# Patient Record
Sex: FEMALE | Race: WHITE | Employment: OTHER | URBAN - METROPOLITAN AREA
[De-identification: names, ages, dates, MRNs, and addresses within clinical notes are randomized per-mention and may not be internally consistent; named-entity substitution may affect disease eponyms.]

---

## 2017-01-01 ENCOUNTER — HOME HEALTH ADMISSION (OUTPATIENT)
Dept: HOME HEALTH SERVICES | Facility: HOME HEALTH | Age: 82
End: 2017-01-01
Payer: MEDICARE

## 2017-01-01 ENCOUNTER — APPOINTMENT (OUTPATIENT)
Dept: GENERAL RADIOLOGY | Age: 82
DRG: 469 | End: 2017-01-01
Attending: ORTHOPAEDIC SURGERY
Payer: MEDICARE

## 2017-01-01 ENCOUNTER — HOME CARE VISIT (OUTPATIENT)
Dept: SCHEDULING | Facility: HOME HEALTH | Age: 82
End: 2017-01-01
Payer: MEDICARE

## 2017-01-01 ENCOUNTER — PATIENT OUTREACH (OUTPATIENT)
Dept: CASE MANAGEMENT | Age: 82
End: 2017-01-01

## 2017-01-01 ENCOUNTER — APPOINTMENT (OUTPATIENT)
Dept: ULTRASOUND IMAGING | Age: 82
DRG: 193 | End: 2017-01-01
Attending: HOSPITALIST
Payer: MEDICARE

## 2017-01-01 ENCOUNTER — HOSPITAL ENCOUNTER (INPATIENT)
Age: 82
LOS: 6 days | Discharge: HOME HEALTH | DRG: 193 | End: 2017-10-05
Admitting: HOSPITALIST
Payer: MEDICARE

## 2017-01-01 ENCOUNTER — APPOINTMENT (OUTPATIENT)
Dept: GENERAL RADIOLOGY | Age: 82
DRG: 683 | End: 2017-01-01
Attending: EMERGENCY MEDICINE
Payer: MEDICARE

## 2017-01-01 ENCOUNTER — APPOINTMENT (OUTPATIENT)
Dept: GENERAL RADIOLOGY | Age: 82
DRG: 193 | End: 2017-01-01
Attending: HOSPITALIST
Payer: MEDICARE

## 2017-01-01 ENCOUNTER — APPOINTMENT (OUTPATIENT)
Dept: GENERAL RADIOLOGY | Age: 82
DRG: 193 | End: 2017-01-01
Payer: MEDICARE

## 2017-01-01 ENCOUNTER — ANESTHESIA EVENT (OUTPATIENT)
Dept: SURGERY | Age: 82
DRG: 469 | End: 2017-01-01
Payer: MEDICARE

## 2017-01-01 ENCOUNTER — HOSPITAL ENCOUNTER (EMERGENCY)
Age: 82
Discharge: HOME | End: 2017-08-26
Attending: STUDENT IN AN ORGANIZED HEALTH CARE EDUCATION/TRAINING PROGRAM
Payer: MEDICARE

## 2017-01-01 ENCOUNTER — HOSPITAL ENCOUNTER (OUTPATIENT)
Age: 82
Setting detail: OBSERVATION
Discharge: HOME | End: 2017-07-18
Attending: EMERGENCY MEDICINE | Admitting: INTERNAL MEDICINE
Payer: MEDICARE

## 2017-01-01 ENCOUNTER — ANESTHESIA (OUTPATIENT)
Dept: SURGERY | Age: 82
DRG: 469 | End: 2017-01-01
Payer: MEDICARE

## 2017-01-01 ENCOUNTER — APPOINTMENT (OUTPATIENT)
Dept: GENERAL RADIOLOGY | Age: 82
DRG: 469 | End: 2017-01-01
Attending: INTERNAL MEDICINE
Payer: MEDICARE

## 2017-01-01 ENCOUNTER — APPOINTMENT (OUTPATIENT)
Dept: GENERAL RADIOLOGY | Age: 82
End: 2017-01-01
Attending: NURSE PRACTITIONER
Payer: MEDICARE

## 2017-01-01 ENCOUNTER — APPOINTMENT (OUTPATIENT)
Dept: GENERAL RADIOLOGY | Age: 82
DRG: 469 | End: 2017-01-01
Attending: NURSE PRACTITIONER
Payer: MEDICARE

## 2017-01-01 ENCOUNTER — HOSPITAL ENCOUNTER (INPATIENT)
Age: 82
LOS: 2 days | Discharge: HOME | DRG: 683 | End: 2017-08-18
Attending: EMERGENCY MEDICINE | Admitting: INTERNAL MEDICINE
Payer: MEDICARE

## 2017-01-01 ENCOUNTER — HOSPITAL ENCOUNTER (INPATIENT)
Age: 82
LOS: 3 days | Discharge: NURSING, REHAB OR LONG-TERM CARE | DRG: 469 | End: 2017-01-15
Attending: ORTHOPAEDIC SURGERY | Admitting: ORTHOPAEDIC SURGERY
Payer: MEDICARE

## 2017-01-01 ENCOUNTER — HOSPITAL ENCOUNTER (OUTPATIENT)
Dept: LAB | Age: 82
Discharge: HOME | End: 2017-07-07
Attending: INTERNAL MEDICINE
Payer: MEDICARE

## 2017-01-01 ENCOUNTER — APPOINTMENT (OUTPATIENT)
Dept: CT IMAGING | Age: 82
End: 2017-01-01
Attending: STUDENT IN AN ORGANIZED HEALTH CARE EDUCATION/TRAINING PROGRAM
Payer: MEDICARE

## 2017-01-01 ENCOUNTER — APPOINTMENT (OUTPATIENT)
Dept: CT IMAGING | Age: 82
DRG: 193 | End: 2017-01-01
Attending: HOSPITALIST
Payer: MEDICARE

## 2017-01-01 VITALS
SYSTOLIC BLOOD PRESSURE: 137 MMHG | TEMPERATURE: 97.9 F | RESPIRATION RATE: 18 BRPM | OXYGEN SATURATION: 91 % | HEIGHT: 63 IN | DIASTOLIC BLOOD PRESSURE: 66 MMHG | WEIGHT: 125 LBS | BODY MASS INDEX: 22.15 KG/M2 | HEART RATE: 78 BPM

## 2017-01-01 VITALS
DIASTOLIC BLOOD PRESSURE: 48 MMHG | BODY MASS INDEX: 20.2 KG/M2 | RESPIRATION RATE: 15 BRPM | HEART RATE: 70 BPM | TEMPERATURE: 97.7 F | SYSTOLIC BLOOD PRESSURE: 106 MMHG | WEIGHT: 114 LBS | OXYGEN SATURATION: 92 % | HEIGHT: 63 IN

## 2017-01-01 VITALS
BODY MASS INDEX: 22.68 KG/M2 | DIASTOLIC BLOOD PRESSURE: 50 MMHG | SYSTOLIC BLOOD PRESSURE: 100 MMHG | HEART RATE: 72 BPM | WEIGHT: 128 LBS | OXYGEN SATURATION: 92 % | HEIGHT: 63 IN

## 2017-01-01 VITALS
DIASTOLIC BLOOD PRESSURE: 52 MMHG | BODY MASS INDEX: 21.62 KG/M2 | OXYGEN SATURATION: 95 % | HEART RATE: 69 BPM | TEMPERATURE: 97.4 F | SYSTOLIC BLOOD PRESSURE: 132 MMHG | WEIGHT: 122 LBS | RESPIRATION RATE: 16 BRPM | HEIGHT: 63 IN

## 2017-01-01 VITALS
OXYGEN SATURATION: 92 % | RESPIRATION RATE: 18 BRPM | HEART RATE: 72 BPM | DIASTOLIC BLOOD PRESSURE: 68 MMHG | SYSTOLIC BLOOD PRESSURE: 124 MMHG

## 2017-01-01 VITALS
RESPIRATION RATE: 23 BRPM | DIASTOLIC BLOOD PRESSURE: 60 MMHG | HEART RATE: 76 BPM | OXYGEN SATURATION: 98 % | SYSTOLIC BLOOD PRESSURE: 118 MMHG | TEMPERATURE: 95.4 F

## 2017-01-01 VITALS
HEIGHT: 62 IN | BODY MASS INDEX: 22.63 KG/M2 | OXYGEN SATURATION: 94 % | WEIGHT: 123 LBS | SYSTOLIC BLOOD PRESSURE: 109 MMHG | HEART RATE: 70 BPM | DIASTOLIC BLOOD PRESSURE: 38 MMHG | RESPIRATION RATE: 18 BRPM | TEMPERATURE: 97.8 F

## 2017-01-01 VITALS
RESPIRATION RATE: 16 BRPM | SYSTOLIC BLOOD PRESSURE: 120 MMHG | OXYGEN SATURATION: 95 % | HEART RATE: 76 BPM | TEMPERATURE: 99.3 F | DIASTOLIC BLOOD PRESSURE: 60 MMHG

## 2017-01-01 VITALS
TEMPERATURE: 96.7 F | HEART RATE: 74 BPM | SYSTOLIC BLOOD PRESSURE: 120 MMHG | RESPIRATION RATE: 16 BRPM | DIASTOLIC BLOOD PRESSURE: 58 MMHG

## 2017-01-01 VITALS
TEMPERATURE: 95 F | SYSTOLIC BLOOD PRESSURE: 125 MMHG | HEART RATE: 69 BPM | DIASTOLIC BLOOD PRESSURE: 55 MMHG | OXYGEN SATURATION: 93 %

## 2017-01-01 VITALS
RESPIRATION RATE: 17 BRPM | SYSTOLIC BLOOD PRESSURE: 118 MMHG | DIASTOLIC BLOOD PRESSURE: 58 MMHG | TEMPERATURE: 96.6 F | HEART RATE: 72 BPM

## 2017-01-01 VITALS
TEMPERATURE: 95.7 F | RESPIRATION RATE: 18 BRPM | DIASTOLIC BLOOD PRESSURE: 60 MMHG | HEART RATE: 68 BPM | SYSTOLIC BLOOD PRESSURE: 112 MMHG

## 2017-01-01 VITALS
SYSTOLIC BLOOD PRESSURE: 112 MMHG | TEMPERATURE: 98 F | DIASTOLIC BLOOD PRESSURE: 58 MMHG | RESPIRATION RATE: 16 BRPM | OXYGEN SATURATION: 97 % | HEART RATE: 77 BPM

## 2017-01-01 VITALS
TEMPERATURE: 96 F | HEART RATE: 70 BPM | OXYGEN SATURATION: 96 % | DIASTOLIC BLOOD PRESSURE: 46 MMHG | BODY MASS INDEX: 25.52 KG/M2 | HEIGHT: 63 IN | SYSTOLIC BLOOD PRESSURE: 99 MMHG | WEIGHT: 144 LBS | RESPIRATION RATE: 18 BRPM

## 2017-01-01 VITALS
TEMPERATURE: 97.8 F | HEART RATE: 70 BPM | OXYGEN SATURATION: 97 % | DIASTOLIC BLOOD PRESSURE: 52 MMHG | RESPIRATION RATE: 20 BRPM | SYSTOLIC BLOOD PRESSURE: 108 MMHG

## 2017-01-01 VITALS
HEIGHT: 63 IN | HEART RATE: 82 BPM | BODY MASS INDEX: 21.09 KG/M2 | SYSTOLIC BLOOD PRESSURE: 102 MMHG | RESPIRATION RATE: 16 BRPM | WEIGHT: 119 LBS | DIASTOLIC BLOOD PRESSURE: 54 MMHG | OXYGEN SATURATION: 98 % | TEMPERATURE: 96.7 F

## 2017-01-01 VITALS
HEART RATE: 84 BPM | RESPIRATION RATE: 18 BRPM | TEMPERATURE: 97.3 F | SYSTOLIC BLOOD PRESSURE: 118 MMHG | DIASTOLIC BLOOD PRESSURE: 58 MMHG

## 2017-01-01 VITALS
HEART RATE: 76 BPM | OXYGEN SATURATION: 95 % | SYSTOLIC BLOOD PRESSURE: 108 MMHG | DIASTOLIC BLOOD PRESSURE: 58 MMHG | TEMPERATURE: 95.7 F | RESPIRATION RATE: 19 BRPM

## 2017-01-01 VITALS
OXYGEN SATURATION: 94 % | HEART RATE: 78 BPM | TEMPERATURE: 97.5 F | RESPIRATION RATE: 16 BRPM | DIASTOLIC BLOOD PRESSURE: 78 MMHG | SYSTOLIC BLOOD PRESSURE: 122 MMHG

## 2017-01-01 VITALS
HEART RATE: 72 BPM | DIASTOLIC BLOOD PRESSURE: 56 MMHG | SYSTOLIC BLOOD PRESSURE: 118 MMHG | TEMPERATURE: 97.1 F | RESPIRATION RATE: 16 BRPM

## 2017-01-01 VITALS
TEMPERATURE: 97.2 F | HEART RATE: 80 BPM | DIASTOLIC BLOOD PRESSURE: 60 MMHG | SYSTOLIC BLOOD PRESSURE: 108 MMHG | RESPIRATION RATE: 20 BRPM

## 2017-01-01 VITALS
RESPIRATION RATE: 14 BRPM | TEMPERATURE: 97.6 F | DIASTOLIC BLOOD PRESSURE: 87 MMHG | HEART RATE: 67 BPM | SYSTOLIC BLOOD PRESSURE: 115 MMHG

## 2017-01-01 VITALS — DIASTOLIC BLOOD PRESSURE: 60 MMHG | OXYGEN SATURATION: 95 % | SYSTOLIC BLOOD PRESSURE: 120 MMHG | HEART RATE: 67 BPM

## 2017-01-01 DIAGNOSIS — N17.9 AKI (ACUTE KIDNEY INJURY) (HCC): Primary | ICD-10-CM

## 2017-01-01 DIAGNOSIS — E86.0 DEHYDRATION: ICD-10-CM

## 2017-01-01 DIAGNOSIS — R79.89 ELEVATED BRAIN NATRIURETIC PEPTIDE (BNP) LEVEL: ICD-10-CM

## 2017-01-01 DIAGNOSIS — N17.9 ACUTE KIDNEY INJURY (HCC): ICD-10-CM

## 2017-01-01 DIAGNOSIS — N17.9 AKI (ACUTE KIDNEY INJURY) (HCC): ICD-10-CM

## 2017-01-01 DIAGNOSIS — I50.9 CONGESTIVE HEART FAILURE, UNSPECIFIED CONGESTIVE HEART FAILURE CHRONICITY, UNSPECIFIED CONGESTIVE HEART FAILURE TYPE: ICD-10-CM

## 2017-01-01 DIAGNOSIS — R77.8 ELEVATED TROPONIN: ICD-10-CM
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
## 2017-01-01 DIAGNOSIS — R19.7 DIARRHEA, UNSPECIFIED TYPE: ICD-10-CM

## 2017-01-01 DIAGNOSIS — I50.22 CHRONIC SYSTOLIC HEART FAILURE (HCC): ICD-10-CM

## 2017-01-01 DIAGNOSIS — N30.00 ACUTE CYSTITIS WITHOUT HEMATURIA: ICD-10-CM

## 2017-01-01 DIAGNOSIS — S52.272A CLOSED MONTEGGIA'S FRACTURE OF LEFT ULNA, INITIAL ENCOUNTER: Primary | ICD-10-CM

## 2017-01-01 DIAGNOSIS — R06.02 SOB (SHORTNESS OF BREATH): Primary | ICD-10-CM

## 2017-01-01 DIAGNOSIS — I95.9 HYPOTENSION, UNSPECIFIED HYPOTENSION TYPE: Primary | ICD-10-CM

## 2017-01-01 LAB
ABO + RH BLD: NORMAL
ALBUMIN SERPL BCP-MCNC: 2.9 G/DL (ref 3.2–4.6)
ALBUMIN SERPL BCP-MCNC: 3 G/DL (ref 3.2–4.6)
ALBUMIN SERPL-MCNC: 3 G/DL (ref 3.2–4.6)
ALBUMIN SERPL-MCNC: 3.3 G/DL (ref 3.2–4.6)
ALBUMIN SERPL-MCNC: 3.4 G/DL (ref 3.2–4.6)
ALBUMIN/GLOB SERPL: 0.7 {RATIO} (ref 1.2–3.5)
ALBUMIN/GLOB SERPL: 0.8 {RATIO} (ref 1.2–3.5)
ALBUMIN/GLOB SERPL: 1 {RATIO} (ref 1.2–3.5)
ALP SERPL-CCNC: 116 U/L (ref 50–136)
ALP SERPL-CCNC: 139 U/L (ref 50–136)
ALP SERPL-CCNC: 83 U/L (ref 50–136)
ALP SERPL-CCNC: 94 U/L (ref 50–136)
ALP SERPL-CCNC: 96 U/L (ref 50–136)
ALT SERPL-CCNC: 161 U/L (ref 12–65)
ALT SERPL-CCNC: 19 U/L (ref 12–65)
ALT SERPL-CCNC: 20 U/L (ref 12–65)
ALT SERPL-CCNC: 29 U/L (ref 12–65)
ALT SERPL-CCNC: 58 U/L (ref 12–65)
ANION GAP BLD CALC-SCNC: 10 MMOL/L (ref 7–16)
ANION GAP BLD CALC-SCNC: 10 MMOL/L (ref 7–16)
ANION GAP BLD CALC-SCNC: 11 MMOL/L (ref 7–16)
ANION GAP BLD CALC-SCNC: 13 MMOL/L (ref 7–16)
ANION GAP BLD CALC-SCNC: 8 MMOL/L
ANION GAP SERPL CALC-SCNC: 12 MMOL/L (ref 7–16)
ANION GAP SERPL CALC-SCNC: 12 MMOL/L (ref 7–16)
ANION GAP SERPL CALC-SCNC: 13 MMOL/L (ref 7–16)
ANION GAP SERPL CALC-SCNC: 13 MMOL/L (ref 7–16)
ANION GAP SERPL CALC-SCNC: 14 MMOL/L (ref 7–16)
ANION GAP SERPL CALC-SCNC: 16 MMOL/L (ref 7–16)
ANION GAP SERPL CALC-SCNC: 17 MMOL/L (ref 7–16)
ANION GAP SERPL CALC-SCNC: 9 MMOL/L (ref 7–16)
APPEARANCE UR: ABNORMAL
APPEARANCE UR: ABNORMAL
ARTERIAL PATENCY WRIST A: POSITIVE
AST SERPL W P-5'-P-CCNC: 17 U/L (ref 15–37)
AST SERPL W P-5'-P-CCNC: 17 U/L (ref 15–37)
AST SERPL-CCNC: 165 U/L (ref 15–37)
AST SERPL-CCNC: 27 U/L (ref 15–37)
AST SERPL-CCNC: 58 U/L (ref 15–37)
ATRIAL RATE: 69 BPM
ATRIAL RATE: 70 BPM
ATRIAL RATE: 79 BPM
ATRIAL RATE: 81 BPM
BACTERIA SPEC CULT: ABNORMAL
BACTERIA SPEC CULT: NORMAL
BACTERIA URNS QL MICRO: 0 /HPF
BACTERIA URNS QL MICRO: 0 /HPF
BACTERIA URNS QL MICRO: ABNORMAL /HPF
BACTERIA URNS QL MICRO: NORMAL /HPF
BASE DEFICIT BLDA-SCNC: 4.5 MMOL/L (ref 0–2)
BASOPHILS # BLD AUTO: 0 K/UL (ref 0–0.2)
BASOPHILS # BLD: 0 % (ref 0–2)
BASOPHILS # BLD: 0 K/UL (ref 0–0.2)
BASOPHILS NFR BLD: 0 % (ref 0–2)
BASOPHILS NFR BLD: 1 % (ref 0–2)
BDY SITE: ABNORMAL
BILIRUB SERPL-MCNC: 0.4 MG/DL (ref 0.2–1.1)
BILIRUB SERPL-MCNC: 0.4 MG/DL (ref 0.2–1.1)
BILIRUB SERPL-MCNC: 0.6 MG/DL (ref 0.2–1.1)
BILIRUB SERPL-MCNC: 0.7 MG/DL (ref 0.2–1.1)
BILIRUB SERPL-MCNC: 1.3 MG/DL (ref 0.2–1.1)
BILIRUB UR QL: NEGATIVE
BILIRUB UR QL: NEGATIVE
BLD PROD TYP BPU: NORMAL
BLOOD GROUP ANTIBODIES SERPL: NORMAL
BNP SERPL-MCNC: 2047 PG/ML
BNP SERPL-MCNC: 4688 PG/ML
BNP SERPL-MCNC: 5536 PG/ML
BPU ID: NORMAL
BUN SERPL-MCNC: 30 MG/DL (ref 8–23)
BUN SERPL-MCNC: 31 MG/DL (ref 8–23)
BUN SERPL-MCNC: 34 MG/DL (ref 8–23)
BUN SERPL-MCNC: 46 MG/DL (ref 8–23)
BUN SERPL-MCNC: 49 MG/DL (ref 8–23)
BUN SERPL-MCNC: 55 MG/DL (ref 8–23)
BUN SERPL-MCNC: 57 MG/DL (ref 8–23)
BUN SERPL-MCNC: 65 MG/DL (ref 8–23)
BUN SERPL-MCNC: 68 MG/DL (ref 8–23)
BUN SERPL-MCNC: 69 MG/DL (ref 8–23)
BUN SERPL-MCNC: 73 MG/DL (ref 8–23)
BUN SERPL-MCNC: 74 MG/DL (ref 8–23)
BUN SERPL-MCNC: 74 MG/DL (ref 8–23)
BUN SERPL-MCNC: 77 MG/DL (ref 8–23)
BUN SERPL-MCNC: 84 MG/DL (ref 8–23)
CALCIUM SERPL-MCNC: 8.6 MG/DL (ref 8.3–10.4)
CALCIUM SERPL-MCNC: 8.7 MG/DL (ref 8.3–10.4)
CALCIUM SERPL-MCNC: 8.7 MG/DL (ref 8.3–10.4)
CALCIUM SERPL-MCNC: 8.8 MG/DL (ref 8.3–10.4)
CALCIUM SERPL-MCNC: 8.9 MG/DL (ref 8.3–10.4)
CALCIUM SERPL-MCNC: 8.9 MG/DL (ref 8.3–10.4)
CALCIUM SERPL-MCNC: 9 MG/DL (ref 8.3–10.4)
CALCIUM SERPL-MCNC: 9.1 MG/DL (ref 8.3–10.4)
CALCIUM SERPL-MCNC: 9.2 MG/DL (ref 8.3–10.4)
CALCIUM SERPL-MCNC: 9.4 MG/DL (ref 8.3–10.4)
CALCIUM SERPL-MCNC: 9.5 MG/DL (ref 8.3–10.4)
CALCIUM SERPL-MCNC: 9.6 MG/DL (ref 8.3–10.4)
CALCULATED P AXIS, ECG09: 102 DEGREES
CALCULATED P AXIS, ECG09: 5 DEGREES
CALCULATED P AXIS, ECG09: 84 DEGREES
CALCULATED P AXIS, ECG09: 91 DEGREES
CALCULATED R AXIS, ECG10: -124 DEGREES
CALCULATED R AXIS, ECG10: -171 DEGREES
CALCULATED R AXIS, ECG10: -87 DEGREES
CALCULATED R AXIS, ECG10: -97 DEGREES
CALCULATED T AXIS, ECG11: 58 DEGREES
CALCULATED T AXIS, ECG11: 69 DEGREES
CALCULATED T AXIS, ECG11: 69 DEGREES
CALCULATED T AXIS, ECG11: 70 DEGREES
CASTS URNS QL MICRO: 0 /LPF
CASTS URNS QL MICRO: ABNORMAL /LPF
CASTS URNS QL MICRO: NORMAL /LPF
CASTS URNS QL MICRO: NORMAL /LPF
CHLORIDE SERPL-SCNC: 101 MMOL/L (ref 98–107)
CHLORIDE SERPL-SCNC: 101 MMOL/L (ref 98–107)
CHLORIDE SERPL-SCNC: 102 MMOL/L (ref 98–107)
CHLORIDE SERPL-SCNC: 104 MMOL/L (ref 98–107)
CHLORIDE SERPL-SCNC: 105 MMOL/L (ref 98–107)
CHLORIDE SERPL-SCNC: 106 MMOL/L (ref 98–107)
CHLORIDE SERPL-SCNC: 107 MMOL/L (ref 98–107)
CHLORIDE SERPL-SCNC: 107 MMOL/L (ref 98–107)
CHLORIDE SERPL-SCNC: 108 MMOL/L (ref 98–107)
CHLORIDE SERPL-SCNC: 109 MMOL/L (ref 98–107)
CHLORIDE SERPL-SCNC: 110 MMOL/L (ref 98–107)
CHLORIDE SERPL-SCNC: 111 MMOL/L (ref 98–107)
CHLORIDE SERPL-SCNC: 112 MMOL/L (ref 98–107)
CO2 SERPL-SCNC: 19 MMOL/L (ref 21–32)
CO2 SERPL-SCNC: 20 MMOL/L (ref 21–32)
CO2 SERPL-SCNC: 21 MMOL/L (ref 21–32)
CO2 SERPL-SCNC: 21 MMOL/L (ref 21–32)
CO2 SERPL-SCNC: 23 MMOL/L (ref 21–32)
CO2 SERPL-SCNC: 24 MMOL/L (ref 21–32)
CO2 SERPL-SCNC: 25 MMOL/L (ref 21–32)
CO2 SERPL-SCNC: 26 MMOL/L (ref 21–32)
CO2 SERPL-SCNC: 27 MMOL/L (ref 21–32)
CO2 SERPL-SCNC: 27 MMOL/L (ref 21–32)
CO2 SERPL-SCNC: 32 MMOL/L (ref 21–32)
COHGB MFR BLD: 0.7 % (ref 0.5–1.5)
COLOR UR: YELLOW
COLOR UR: YELLOW
CREAT SERPL-MCNC: 1.29 MG/DL (ref 0.6–1)
CREAT SERPL-MCNC: 1.31 MG/DL (ref 0.6–1)
CREAT SERPL-MCNC: 1.4 MG/DL (ref 0.6–1)
CREAT SERPL-MCNC: 1.71 MG/DL (ref 0.6–1)
CREAT SERPL-MCNC: 1.86 MG/DL (ref 0.6–1)
CREAT SERPL-MCNC: 1.87 MG/DL (ref 0.6–1)
CREAT SERPL-MCNC: 2.07 MG/DL (ref 0.6–1)
CREAT SERPL-MCNC: 2.12 MG/DL (ref 0.6–1)
CREAT SERPL-MCNC: 2.15 MG/DL (ref 0.6–1)
CREAT SERPL-MCNC: 2.28 MG/DL (ref 0.6–1)
CREAT SERPL-MCNC: 2.35 MG/DL (ref 0.6–1)
CREAT SERPL-MCNC: 2.4 MG/DL (ref 0.6–1)
CREAT SERPL-MCNC: 2.48 MG/DL (ref 0.6–1)
CREAT SERPL-MCNC: 2.86 MG/DL (ref 0.6–1)
CREAT SERPL-MCNC: 2.87 MG/DL (ref 0.6–1)
CREAT UR-MCNC: 105 MG/DL
CROSSMATCH RESULT,%XM: NORMAL
CRYSTALS URNS QL MICRO: 0 /LPF
DIAGNOSIS, 93000: NORMAL
DIFFERENTIAL METHOD BLD: ABNORMAL
DO-HGB BLD-MCNC: 14 % (ref 0–5)
EOSINOPHIL # BLD: 0 K/UL (ref 0–0.8)
EOSINOPHIL # BLD: 0.1 K/UL (ref 0–0.8)
EOSINOPHIL # BLD: 0.2 K/UL (ref 0–0.8)
EOSINOPHIL # BLD: 0.2 K/UL (ref 0–0.8)
EOSINOPHIL NFR BLD: 0 % (ref 0.5–7.8)
EOSINOPHIL NFR BLD: 0 % (ref 0.5–7.8)
EOSINOPHIL NFR BLD: 1 % (ref 0.5–7.8)
EOSINOPHIL NFR BLD: 2 % (ref 0.5–7.8)
EPI CELLS #/AREA URNS HPF: 0 /HPF
EPI CELLS #/AREA URNS HPF: ABNORMAL /HPF
ERYTHROCYTE [DISTWIDTH] IN BLOOD BY AUTOMATED COUNT: 14.1 % (ref 11.9–14.6)
ERYTHROCYTE [DISTWIDTH] IN BLOOD BY AUTOMATED COUNT: 14.2 % (ref 11.9–14.6)
ERYTHROCYTE [DISTWIDTH] IN BLOOD BY AUTOMATED COUNT: 14.2 % (ref 11.9–14.6)
ERYTHROCYTE [DISTWIDTH] IN BLOOD BY AUTOMATED COUNT: 15 % (ref 11.9–14.6)
ERYTHROCYTE [DISTWIDTH] IN BLOOD BY AUTOMATED COUNT: 15.1 % (ref 11.9–14.6)
ERYTHROCYTE [DISTWIDTH] IN BLOOD BY AUTOMATED COUNT: 15.3 % (ref 11.9–14.6)
ERYTHROCYTE [DISTWIDTH] IN BLOOD BY AUTOMATED COUNT: 15.3 % (ref 11.9–14.6)
ERYTHROCYTE [DISTWIDTH] IN BLOOD BY AUTOMATED COUNT: 15.4 % (ref 11.9–14.6)
ERYTHROCYTE [DISTWIDTH] IN BLOOD BY AUTOMATED COUNT: 15.4 % (ref 11.9–14.6)
ERYTHROCYTE [DISTWIDTH] IN BLOOD BY AUTOMATED COUNT: 15.6 % (ref 11.9–14.6)
ERYTHROCYTE [DISTWIDTH] IN BLOOD BY AUTOMATED COUNT: 17.6 % (ref 11.9–14.6)
ERYTHROCYTE [DISTWIDTH] IN BLOOD BY AUTOMATED COUNT: 17.6 % (ref 11.9–14.6)
ERYTHROCYTE [DISTWIDTH] IN BLOOD BY AUTOMATED COUNT: 17.7 % (ref 11.9–14.6)
ERYTHROCYTE [DISTWIDTH] IN BLOOD BY AUTOMATED COUNT: 17.8 % (ref 11.9–14.6)
ERYTHROCYTE [DISTWIDTH] IN BLOOD BY AUTOMATED COUNT: 18.1 % (ref 11.9–14.6)
ERYTHROCYTE [DISTWIDTH] IN BLOOD BY AUTOMATED COUNT: 18.3 % (ref 11.9–14.6)
FLUAV AG NPH QL IA: NEGATIVE
FLUBV AG NPH QL IA: NEGATIVE
GASTROCULT GAST QL: POSITIVE
GLOBULIN SER CALC-MCNC: 3.5 G/DL (ref 2.3–3.5)
GLOBULIN SER CALC-MCNC: 3.6 G/DL (ref 2.3–3.5)
GLOBULIN SER CALC-MCNC: 3.9 G/DL (ref 2.3–3.5)
GLOBULIN SER CALC-MCNC: 4.2 G/DL (ref 2.3–3.5)
GLOBULIN SER CALC-MCNC: 4.3 G/DL (ref 2.3–3.5)
GLUCOSE SERPL-MCNC: 104 MG/DL (ref 65–100)
GLUCOSE SERPL-MCNC: 127 MG/DL (ref 65–100)
GLUCOSE SERPL-MCNC: 127 MG/DL (ref 65–100)
GLUCOSE SERPL-MCNC: 132 MG/DL (ref 65–100)
GLUCOSE SERPL-MCNC: 155 MG/DL (ref 65–100)
GLUCOSE SERPL-MCNC: 70 MG/DL (ref 65–100)
GLUCOSE SERPL-MCNC: 83 MG/DL (ref 65–100)
GLUCOSE SERPL-MCNC: 85 MG/DL (ref 65–100)
GLUCOSE SERPL-MCNC: 85 MG/DL (ref 65–100)
GLUCOSE SERPL-MCNC: 91 MG/DL (ref 65–100)
GLUCOSE SERPL-MCNC: 94 MG/DL (ref 65–100)
GLUCOSE SERPL-MCNC: 96 MG/DL (ref 65–100)
GLUCOSE SERPL-MCNC: 97 MG/DL (ref 65–100)
GLUCOSE UR STRIP.AUTO-MCNC: NEGATIVE MG/DL
GLUCOSE UR STRIP.AUTO-MCNC: NEGATIVE MG/DL
HCO3 BLDA-SCNC: 21 MMOL/L (ref 22–26)
HCT VFR BLD AUTO: 25.8 % (ref 35.8–46.3)
HCT VFR BLD AUTO: 31.5 % (ref 35.8–46.3)
HCT VFR BLD AUTO: 32.7 % (ref 35.8–46.3)
HCT VFR BLD AUTO: 33.3 % (ref 35.8–46.3)
HCT VFR BLD AUTO: 33.6 % (ref 35.8–46.3)
HCT VFR BLD AUTO: 34.9 % (ref 35.8–46.3)
HCT VFR BLD AUTO: 36.1 % (ref 35.8–46.3)
HCT VFR BLD AUTO: 36.6 % (ref 35.8–46.3)
HCT VFR BLD AUTO: 37 % (ref 35.8–46.3)
HCT VFR BLD AUTO: 37.3 % (ref 35.8–46.3)
HCT VFR BLD AUTO: 38.4 % (ref 35.8–46.3)
HCT VFR BLD AUTO: 38.4 % (ref 35.8–46.3)
HCT VFR BLD AUTO: 40.3 % (ref 35.8–46.3)
HCT VFR BLD AUTO: 40.5 % (ref 35.8–46.3)
HCT VFR BLD AUTO: 40.9 % (ref 35.8–46.3)
HCT VFR BLD AUTO: 41 % (ref 35.8–46.3)
HGB BLD-MCNC: 10.5 G/DL (ref 11.7–15.4)
HGB BLD-MCNC: 10.5 G/DL (ref 11.7–15.4)
HGB BLD-MCNC: 10.6 G/DL (ref 11.7–15.4)
HGB BLD-MCNC: 11.1 G/DL (ref 11.7–15.4)
HGB BLD-MCNC: 11.3 G/DL (ref 11.7–15.4)
HGB BLD-MCNC: 11.9 G/DL (ref 11.7–15.4)
HGB BLD-MCNC: 12.1 G/DL (ref 11.7–15.4)
HGB BLD-MCNC: 12.3 G/DL (ref 11.7–15.4)
HGB BLD-MCNC: 12.7 G/DL (ref 11.7–15.4)
HGB BLD-MCNC: 13 G/DL (ref 11.7–15.4)
HGB BLD-MCNC: 13.2 G/DL (ref 11.7–15.4)
HGB BLD-MCNC: 8.4 G/DL (ref 11.7–15.4)
HGB BLDMV-MCNC: 13.8 GM/DL (ref 11.7–15)
HGB UR QL STRIP: ABNORMAL
HGB UR QL STRIP: ABNORMAL
IMM GRANULOCYTES # BLD: 0 K/UL (ref 0–0.5)
IMM GRANULOCYTES NFR BLD AUTO: 0.1 % (ref 0–5)
IMM GRANULOCYTES NFR BLD AUTO: 0.2 % (ref 0–5)
IMM GRANULOCYTES NFR BLD AUTO: 0.2 % (ref 0–5)
IMM GRANULOCYTES NFR BLD AUTO: 0.3 % (ref 0–5)
IMM GRANULOCYTES NFR BLD AUTO: 0.4 % (ref 0–5)
IMM GRANULOCYTES NFR BLD: 0.1 % (ref 0–5)
IMM GRANULOCYTES NFR BLD: 0.3 % (ref 0–5)
IMM GRANULOCYTES NFR BLD: 0.4 % (ref 0–5)
IMM GRANULOCYTES NFR BLD: 0.7 % (ref 0–5)
KETONES UR QL STRIP.AUTO: NEGATIVE MG/DL
KETONES UR QL STRIP.AUTO: NEGATIVE MG/DL
LACTATE BLD-SCNC: 0.9 MMOL/L (ref 0.5–1.9)
LACTATE BLD-SCNC: 1.9 MMOL/L (ref 0.5–1.9)
LEUKOCYTE ESTERASE UR QL STRIP.AUTO: ABNORMAL
LEUKOCYTE ESTERASE UR QL STRIP.AUTO: ABNORMAL
LYMPHOCYTES # BLD AUTO: 11 % (ref 13–44)
LYMPHOCYTES # BLD AUTO: 3 % (ref 13–44)
LYMPHOCYTES # BLD AUTO: 6 % (ref 13–44)
LYMPHOCYTES # BLD AUTO: 6 % (ref 13–44)
LYMPHOCYTES # BLD AUTO: 8 % (ref 13–44)
LYMPHOCYTES # BLD: 0.3 K/UL (ref 0.5–4.6)
LYMPHOCYTES # BLD: 0.4 K/UL (ref 0.5–4.6)
LYMPHOCYTES # BLD: 0.5 K/UL (ref 0.5–4.6)
LYMPHOCYTES # BLD: 0.6 K/UL (ref 0.5–4.6)
LYMPHOCYTES # BLD: 0.8 K/UL (ref 0.5–4.6)
LYMPHOCYTES # BLD: 0.8 K/UL (ref 0.5–4.6)
LYMPHOCYTES # BLD: 0.9 K/UL (ref 0.5–4.6)
LYMPHOCYTES # BLD: 1 K/UL (ref 0.5–4.6)
LYMPHOCYTES NFR BLD: 10 % (ref 13–44)
LYMPHOCYTES NFR BLD: 11 % (ref 13–44)
LYMPHOCYTES NFR BLD: 13 % (ref 13–44)
LYMPHOCYTES NFR BLD: 5 % (ref 13–44)
LYMPHOCYTES NFR BLD: 6 % (ref 13–44)
LYMPHOCYTES NFR BLD: 7 % (ref 13–44)
LYMPHOCYTES NFR BLD: 8 % (ref 13–44)
LYMPHOCYTES NFR BLD: 9 % (ref 13–44)
MAGNESIUM SERPL-MCNC: 2.1 MG/DL (ref 1.8–2.4)
MAGNESIUM SERPL-MCNC: 2.4 MG/DL (ref 1.8–2.4)
MAGNESIUM SERPL-MCNC: 2.6 MG/DL (ref 1.8–2.4)
MAGNESIUM SERPL-MCNC: 2.8 MG/DL (ref 1.8–2.4)
MCH RBC QN AUTO: 31.5 PG (ref 26.1–32.9)
MCH RBC QN AUTO: 31.5 PG (ref 26.1–32.9)
MCH RBC QN AUTO: 31.7 PG (ref 26.1–32.9)
MCH RBC QN AUTO: 31.9 PG (ref 26.1–32.9)
MCH RBC QN AUTO: 32 PG (ref 26.1–32.9)
MCH RBC QN AUTO: 32 PG (ref 26.1–32.9)
MCH RBC QN AUTO: 32.2 PG (ref 26.1–32.9)
MCH RBC QN AUTO: 32.3 PG (ref 26.1–32.9)
MCH RBC QN AUTO: 32.5 PG (ref 26.1–32.9)
MCH RBC QN AUTO: 32.7 PG (ref 26.1–32.9)
MCH RBC QN AUTO: 32.7 PG (ref 26.1–32.9)
MCH RBC QN AUTO: 32.8 PG (ref 26.1–32.9)
MCH RBC QN AUTO: 33.1 PG (ref 26.1–32.9)
MCH RBC QN AUTO: 33.1 PG (ref 26.1–32.9)
MCHC RBC AUTO-ENTMCNC: 31.4 G/DL (ref 31.4–35)
MCHC RBC AUTO-ENTMCNC: 31.5 G/DL (ref 31.4–35)
MCHC RBC AUTO-ENTMCNC: 31.7 G/DL (ref 31.4–35)
MCHC RBC AUTO-ENTMCNC: 32.1 G/DL (ref 31.4–35)
MCHC RBC AUTO-ENTMCNC: 32.3 G/DL (ref 31.4–35)
MCHC RBC AUTO-ENTMCNC: 32.4 G/DL (ref 31.4–35)
MCHC RBC AUTO-ENTMCNC: 32.5 G/DL (ref 31.4–35)
MCHC RBC AUTO-ENTMCNC: 32.6 G/DL (ref 31.4–35)
MCHC RBC AUTO-ENTMCNC: 33.1 G/DL (ref 31.4–35)
MCHC RBC AUTO-ENTMCNC: 33.2 G/DL (ref 31.4–35)
MCHC RBC AUTO-ENTMCNC: 33.3 G/DL (ref 31.4–35)
MCHC RBC AUTO-ENTMCNC: 33.3 G/DL (ref 31.4–35)
MCHC RBC AUTO-ENTMCNC: 34 G/DL (ref 31.4–35)
MCHC RBC AUTO-ENTMCNC: 35.2 G/DL (ref 31.4–35)
MCV RBC AUTO: 101.2 FL (ref 79.6–97.8)
MCV RBC AUTO: 103.1 FL (ref 79.6–97.8)
MCV RBC AUTO: 103.2 FL (ref 79.6–97.8)
MCV RBC AUTO: 104.1 FL (ref 79.6–97.8)
MCV RBC AUTO: 104.3 FL (ref 79.6–97.8)
MCV RBC AUTO: 94 FL (ref 79.6–97.8)
MCV RBC AUTO: 94.6 FL (ref 79.6–97.8)
MCV RBC AUTO: 94.9 FL (ref 79.6–97.8)
MCV RBC AUTO: 96 FL (ref 79.6–97.8)
MCV RBC AUTO: 96.9 FL (ref 79.6–97.8)
MCV RBC AUTO: 98.1 FL (ref 79.6–97.8)
MCV RBC AUTO: 98.4 FL (ref 79.6–97.8)
MCV RBC AUTO: 98.8 FL (ref 79.6–97.8)
MCV RBC AUTO: 99 FL (ref 79.6–97.8)
MCV RBC AUTO: 99.7 FL (ref 79.6–97.8)
MCV RBC AUTO: 99.7 FL (ref 79.6–97.8)
METHGB MFR BLD: 0.3 % (ref 0–1.5)
MM INDURATION POC: 0 MM (ref 0–5)
MM INDURATION POC: 0 MM (ref 0–5)
MONOCYTES # BLD: 0.1 K/UL (ref 0.1–1.3)
MONOCYTES # BLD: 0.3 K/UL (ref 0.1–1.3)
MONOCYTES # BLD: 0.3 K/UL (ref 0.1–1.3)
MONOCYTES # BLD: 0.4 K/UL (ref 0.1–1.3)
MONOCYTES # BLD: 0.5 K/UL (ref 0.1–1.3)
MONOCYTES # BLD: 0.6 K/UL (ref 0.1–1.3)
MONOCYTES # BLD: 0.7 K/UL (ref 0.1–1.3)
MONOCYTES # BLD: 0.8 K/UL (ref 0.1–1.3)
MONOCYTES # BLD: 1 K/UL (ref 0.1–1.3)
MONOCYTES # BLD: 1.3 K/UL (ref 0.1–1.3)
MONOCYTES NFR BLD AUTO: 1 % (ref 4–12)
MONOCYTES NFR BLD AUTO: 3 % (ref 4–12)
MONOCYTES NFR BLD AUTO: 4 % (ref 4–12)
MONOCYTES NFR BLD AUTO: 7 % (ref 4–12)
MONOCYTES NFR BLD AUTO: 7 % (ref 4–12)
MONOCYTES NFR BLD: 11 % (ref 4–12)
MONOCYTES NFR BLD: 12 % (ref 4–12)
MONOCYTES NFR BLD: 13 % (ref 4–12)
MONOCYTES NFR BLD: 20 % (ref 4–12)
MONOCYTES NFR BLD: 5 % (ref 4–12)
MONOCYTES NFR BLD: 6 % (ref 4–12)
MONOCYTES NFR BLD: 7 % (ref 4–12)
MONOCYTES NFR BLD: 7 % (ref 4–12)
MONOCYTES NFR BLD: 8 % (ref 4–12)
MONOCYTES NFR BLD: 9 % (ref 4–12)
MUCOUS THREADS URNS QL MICRO: 0 /LPF
NEUTS SEG # BLD: 10.1 K/UL (ref 1.7–8.2)
NEUTS SEG # BLD: 11.9 K/UL (ref 1.7–8.2)
NEUTS SEG # BLD: 4 K/UL (ref 1.7–8.2)
NEUTS SEG # BLD: 4.4 K/UL (ref 1.7–8.2)
NEUTS SEG # BLD: 4.6 K/UL (ref 1.7–8.2)
NEUTS SEG # BLD: 4.9 K/UL (ref 1.7–8.2)
NEUTS SEG # BLD: 5.6 K/UL (ref 1.7–8.2)
NEUTS SEG # BLD: 5.9 K/UL (ref 1.7–8.2)
NEUTS SEG # BLD: 6.1 K/UL (ref 1.7–8.2)
NEUTS SEG # BLD: 6.3 K/UL (ref 1.7–8.2)
NEUTS SEG # BLD: 6.7 K/UL (ref 1.7–8.2)
NEUTS SEG # BLD: 6.9 K/UL (ref 1.7–8.2)
NEUTS SEG # BLD: 7.3 K/UL (ref 1.7–8.2)
NEUTS SEG # BLD: 8.1 K/UL (ref 1.7–8.2)
NEUTS SEG # BLD: 9.1 K/UL (ref 1.7–8.2)
NEUTS SEG NFR BLD AUTO: 84 % (ref 43–78)
NEUTS SEG NFR BLD AUTO: 85 % (ref 43–78)
NEUTS SEG NFR BLD AUTO: 89 % (ref 43–78)
NEUTS SEG NFR BLD AUTO: 90 % (ref 43–78)
NEUTS SEG NFR BLD AUTO: 91 % (ref 43–78)
NEUTS SEG NFR BLD: 70 % (ref 43–78)
NEUTS SEG NFR BLD: 71 % (ref 43–78)
NEUTS SEG NFR BLD: 76 % (ref 43–78)
NEUTS SEG NFR BLD: 81 % (ref 43–78)
NEUTS SEG NFR BLD: 81 % (ref 43–78)
NEUTS SEG NFR BLD: 82 % (ref 43–78)
NEUTS SEG NFR BLD: 83 % (ref 43–78)
NEUTS SEG NFR BLD: 85 % (ref 43–78)
NEUTS SEG NFR BLD: 88 % (ref 43–78)
NEUTS SEG NFR BLD: 89 % (ref 43–78)
NITRITE UR QL STRIP.AUTO: NEGATIVE
NITRITE UR QL STRIP.AUTO: NEGATIVE
OTHER OBSERVATIONS,UCOM: NORMAL
OXYHGB MFR BLDA: 85.2 % (ref 94–97)
P-R INTERVAL, ECG05: 118 MS
P-R INTERVAL, ECG05: 130 MS
P-R INTERVAL, ECG05: 144 MS
P-R INTERVAL, ECG05: 168 MS
PCO2 BLDA: 38 MMHG (ref 35–45)
PH BLDA: 7.35 [PH] (ref 7.35–7.45)
PH UR STRIP: 5.5 [PH] (ref 5–9)
PH UR STRIP: 5.5 [PH] (ref 5–9)
PLATELET # BLD AUTO: 130 K/UL (ref 150–450)
PLATELET # BLD AUTO: 145 K/UL (ref 150–450)
PLATELET # BLD AUTO: 169 K/UL (ref 150–450)
PLATELET # BLD AUTO: 170 K/UL (ref 150–450)
PLATELET # BLD AUTO: 181 K/UL (ref 150–450)
PLATELET # BLD AUTO: 189 K/UL (ref 150–450)
PLATELET # BLD AUTO: 192 K/UL (ref 150–450)
PLATELET # BLD AUTO: 192 K/UL (ref 150–450)
PLATELET # BLD AUTO: 193 K/UL (ref 150–450)
PLATELET # BLD AUTO: 195 K/UL (ref 150–450)
PLATELET # BLD AUTO: 195 K/UL (ref 150–450)
PLATELET # BLD AUTO: 204 K/UL (ref 150–450)
PLATELET # BLD AUTO: 212 K/UL (ref 150–450)
PLATELET # BLD AUTO: 215 K/UL (ref 150–450)
PLATELET # BLD AUTO: 245 K/UL (ref 150–450)
PLATELET # BLD AUTO: 246 K/UL (ref 150–450)
PLATELET COMMENTS,PCOM: ADEQUATE
PMV BLD AUTO: 10.4 FL (ref 10.8–14.1)
PMV BLD AUTO: 10.6 FL (ref 10.8–14.1)
PMV BLD AUTO: 10.7 FL (ref 10.8–14.1)
PMV BLD AUTO: 10.9 FL (ref 10.8–14.1)
PMV BLD AUTO: 11 FL (ref 10.8–14.1)
PMV BLD AUTO: 11 FL (ref 10.8–14.1)
PMV BLD AUTO: 11.1 FL (ref 10.8–14.1)
PMV BLD AUTO: 11.2 FL (ref 10.8–14.1)
PMV BLD AUTO: 11.4 FL (ref 10.8–14.1)
PMV BLD AUTO: 11.5 FL (ref 10.8–14.1)
PMV BLD AUTO: 11.6 FL (ref 10.8–14.1)
PMV BLD AUTO: 11.7 FL (ref 10.8–14.1)
PMV BLD AUTO: 11.8 FL (ref 10.8–14.1)
PMV BLD AUTO: 11.8 FL (ref 10.8–14.1)
PMV BLD AUTO: 11.9 FL (ref 10.8–14.1)
PMV BLD AUTO: 11.9 FL (ref 10.8–14.1)
PO2 BLDA: 56 MMHG (ref 80–105)
POTASSIUM SERPL-SCNC: 3.2 MMOL/L (ref 3.5–5.1)
POTASSIUM SERPL-SCNC: 3.3 MMOL/L (ref 3.5–5.1)
POTASSIUM SERPL-SCNC: 3.6 MMOL/L (ref 3.5–5.1)
POTASSIUM SERPL-SCNC: 3.7 MMOL/L (ref 3.5–5.1)
POTASSIUM SERPL-SCNC: 3.9 MMOL/L (ref 3.5–5.1)
POTASSIUM SERPL-SCNC: 4.2 MMOL/L (ref 3.5–5.1)
POTASSIUM SERPL-SCNC: 4.3 MMOL/L (ref 3.5–5.1)
POTASSIUM SERPL-SCNC: 4.3 MMOL/L (ref 3.5–5.1)
POTASSIUM SERPL-SCNC: 4.4 MMOL/L (ref 3.5–5.1)
POTASSIUM SERPL-SCNC: 4.6 MMOL/L (ref 3.5–5.1)
POTASSIUM SERPL-SCNC: 4.6 MMOL/L (ref 3.5–5.1)
POTASSIUM SERPL-SCNC: 4.7 MMOL/L (ref 3.5–5.1)
POTASSIUM SERPL-SCNC: 4.7 MMOL/L (ref 3.5–5.1)
POTASSIUM SERPL-SCNC: 4.9 MMOL/L (ref 3.5–5.1)
POTASSIUM SERPL-SCNC: 5.9 MMOL/L (ref 3.5–5.1)
PPD POC: NORMAL NEGATIVE
PPD POC: NORMAL NEGATIVE
PROCALCITONIN SERPL-MCNC: 0.3 NG/ML
PROCALCITONIN SERPL-MCNC: 0.6 NG/ML
PROT SERPL-MCNC: 6.5 G/DL (ref 6.3–8.2)
PROT SERPL-MCNC: 6.9 G/DL (ref 6.3–8.2)
PROT SERPL-MCNC: 6.9 G/DL (ref 6.3–8.2)
PROT SERPL-MCNC: 7.3 G/DL (ref 6.3–8.2)
PROT SERPL-MCNC: 7.5 G/DL (ref 6.3–8.2)
PROT UR STRIP-MCNC: 30 MG/DL
PROT UR STRIP-MCNC: ABNORMAL MG/DL
PTH-INTACT SERPL-MCNC: 52.4 PG/ML (ref 14–72)
Q-T INTERVAL, ECG07: 466 MS
Q-T INTERVAL, ECG07: 470 MS
Q-T INTERVAL, ECG07: 504 MS
Q-T INTERVAL, ECG07: 524 MS
QRS DURATION, ECG06: 174 MS
QRS DURATION, ECG06: 176 MS
QRS DURATION, ECG06: 188 MS
QRS DURATION, ECG06: 194 MS
QTC CALCULATION (BEZET), ECG08: 534 MS
QTC CALCULATION (BEZET), ECG08: 544 MS
QTC CALCULATION (BEZET), ECG08: 545 MS
QTC CALCULATION (BEZET), ECG08: 561 MS
RBC # BLD AUTO: 2.63 M/UL (ref 4.05–5.25)
RBC # BLD AUTO: 3.28 M/UL (ref 4.05–5.25)
RBC # BLD AUTO: 3.31 M/UL (ref 4.05–5.25)
RBC # BLD AUTO: 3.37 M/UL (ref 4.05–5.25)
RBC # BLD AUTO: 3.5 M/UL (ref 4.05–5.25)
RBC # BLD AUTO: 3.52 M/UL (ref 4.05–5.25)
RBC # BLD AUTO: 3.72 M/UL (ref 4.05–5.25)
RBC # BLD AUTO: 3.72 M/UL (ref 4.05–5.25)
RBC # BLD AUTO: 3.82 M/UL (ref 4.05–5.25)
RBC # BLD AUTO: 3.84 M/UL (ref 4.05–5.25)
RBC # BLD AUTO: 3.87 M/UL (ref 4.05–5.25)
RBC # BLD AUTO: 3.88 M/UL (ref 4.05–5.25)
RBC # BLD AUTO: 3.93 M/UL (ref 4.05–5.25)
RBC # BLD AUTO: 4.04 M/UL (ref 4.05–5.25)
RBC #/AREA URNS HPF: ABNORMAL /HPF
RBC #/AREA URNS HPF: ABNORMAL /HPF
RBC #/AREA URNS HPF: NORMAL /HPF
RBC #/AREA URNS HPF: NORMAL /HPF
RBC MORPH BLD: ABNORMAL
RBC MORPH BLD: ABNORMAL
SAO2 % BLD: 86 % (ref 92–98.5)
SERVICE CMNT-IMP: ABNORMAL
SERVICE CMNT-IMP: NORMAL
SODIUM SERPL-SCNC: 137 MMOL/L (ref 136–145)
SODIUM SERPL-SCNC: 137 MMOL/L (ref 136–145)
SODIUM SERPL-SCNC: 138 MMOL/L (ref 136–145)
SODIUM SERPL-SCNC: 139 MMOL/L (ref 136–145)
SODIUM SERPL-SCNC: 140 MMOL/L (ref 136–145)
SODIUM SERPL-SCNC: 141 MMOL/L (ref 136–145)
SODIUM SERPL-SCNC: 142 MMOL/L (ref 136–145)
SODIUM SERPL-SCNC: 143 MMOL/L (ref 136–145)
SODIUM SERPL-SCNC: 145 MMOL/L (ref 136–145)
SODIUM SERPL-SCNC: 145 MMOL/L (ref 136–145)
SODIUM SERPL-SCNC: 147 MMOL/L (ref 136–145)
SODIUM UR-SCNC: 16 MMOL/L
SP GR UR REFRACTOMETRY: 1.01 (ref 1–1.02)
SP GR UR REFRACTOMETRY: 1.01 (ref 1–1.02)
SPECIMEN EXP DATE BLD: NORMAL
STATUS OF UNIT,%ST: NORMAL
TROPONIN I BLD-MCNC: 0.01 NG/ML (ref 0.02–0.05)
TROPONIN I SERPL-MCNC: 0.05 NG/ML (ref 0.02–0.05)
TROPONIN I SERPL-MCNC: 0.16 NG/ML (ref 0.02–0.05)
TROPONIN I SERPL-MCNC: 0.27 NG/ML (ref 0.02–0.05)
UNIT DIVISION, %UDIV: 0
URATE SERPL-MCNC: 11.2 MG/DL (ref 2.6–6)
UROBILINOGEN UR QL STRIP.AUTO: 0.2 EU/DL (ref 0.2–1)
UROBILINOGEN UR QL STRIP.AUTO: 1 EU/DL (ref 0.2–1)
VENTILATION MODE VENT: ABNORMAL
VENTRICULAR RATE, ECG03: 69 BPM
VENTRICULAR RATE, ECG03: 70 BPM
VENTRICULAR RATE, ECG03: 79 BPM
VENTRICULAR RATE, ECG03: 81 BPM
WBC # BLD AUTO: 10.2 K/UL (ref 4.3–11.1)
WBC # BLD AUTO: 11.5 K/UL (ref 4.3–11.1)
WBC # BLD AUTO: 13.1 K/UL (ref 4.3–11.1)
WBC # BLD AUTO: 5.4 K/UL (ref 4.3–11.1)
WBC # BLD AUTO: 5.7 K/UL (ref 4.3–11.1)
WBC # BLD AUTO: 6.1 K/UL (ref 4.3–11.1)
WBC # BLD AUTO: 6.5 K/UL (ref 4.3–11.1)
WBC # BLD AUTO: 6.5 K/UL (ref 4.3–11.1)
WBC # BLD AUTO: 6.9 K/UL (ref 4.3–11.1)
WBC # BLD AUTO: 7.1 K/UL (ref 4.3–11.1)
WBC # BLD AUTO: 7.2 K/UL (ref 4.3–11.1)
WBC # BLD AUTO: 8 K/UL (ref 4.3–11.1)
WBC # BLD AUTO: 8.2 K/UL (ref 4.3–11.1)
WBC # BLD AUTO: 8.3 K/UL (ref 4.3–11.1)
WBC # BLD AUTO: 8.4 K/UL (ref 4.3–11.1)
WBC # BLD AUTO: 9.2 K/UL (ref 4.3–11.1)
WBC MORPH BLD: ABNORMAL
WBC URNS QL MICRO: >100 /HPF
WBC URNS QL MICRO: ABNORMAL /HPF
WBC URNS QL MICRO: NORMAL /HPF
WBC URNS QL MICRO: NORMAL /HPF

## 2017-01-01 PROCEDURE — 3331090002 HH PPS REVENUE DEBIT

## 2017-01-01 PROCEDURE — 80048 BASIC METABOLIC PNL TOTAL CA: CPT | Performed by: HOSPITALIST

## 2017-01-01 PROCEDURE — 74011250636 HC RX REV CODE- 250/636: Performed by: HOSPITALIST

## 2017-01-01 PROCEDURE — 94640 AIRWAY INHALATION TREATMENT: CPT

## 2017-01-01 PROCEDURE — 36415 COLL VENOUS BLD VENIPUNCTURE: CPT | Performed by: HOSPITALIST

## 2017-01-01 PROCEDURE — 97530 THERAPEUTIC ACTIVITIES: CPT

## 2017-01-01 PROCEDURE — 77030020816 HC BN GRFT SUB MUSC -F: Performed by: ORTHOPAEDIC SURGERY

## 2017-01-01 PROCEDURE — 77030003862 HC BIT DRL SYNT -B: Performed by: ORTHOPAEDIC SURGERY

## 2017-01-01 PROCEDURE — G0151 HHCP-SERV OF PT,EA 15 MIN: HCPCS

## 2017-01-01 PROCEDURE — 94760 N-INVAS EAR/PLS OXIMETRY 1: CPT

## 2017-01-01 PROCEDURE — 77010033678 HC OXYGEN DAILY

## 2017-01-01 PROCEDURE — 3331090001 HH PPS REVENUE CREDIT

## 2017-01-01 PROCEDURE — 84484 ASSAY OF TROPONIN QUANT: CPT

## 2017-01-01 PROCEDURE — 87804 INFLUENZA ASSAY W/OPTIC: CPT

## 2017-01-01 PROCEDURE — 36415 COLL VENOUS BLD VENIPUNCTURE: CPT | Performed by: INTERNAL MEDICINE

## 2017-01-01 PROCEDURE — 80048 BASIC METABOLIC PNL TOTAL CA: CPT | Performed by: INTERNAL MEDICINE

## 2017-01-01 PROCEDURE — L1830 KO IMMOB CANVAS LONG PRE OTS: HCPCS | Performed by: ORTHOPAEDIC SURGERY

## 2017-01-01 PROCEDURE — 83605 ASSAY OF LACTIC ACID: CPT

## 2017-01-01 PROCEDURE — 99285 EMERGENCY DEPT VISIT HI MDM: CPT | Performed by: EMERGENCY MEDICINE

## 2017-01-01 PROCEDURE — G0157 HHC PT ASSISTANT EA 15: HCPCS

## 2017-01-01 PROCEDURE — P9047 ALBUMIN (HUMAN), 25%, 50ML: HCPCS | Performed by: INTERNAL MEDICINE

## 2017-01-01 PROCEDURE — 71010 XR CHEST PORT: CPT

## 2017-01-01 PROCEDURE — 77030013131 HC IV BLD ST ICUM -A

## 2017-01-01 PROCEDURE — 87040 BLOOD CULTURE FOR BACTERIA: CPT

## 2017-01-01 PROCEDURE — 74011000255 HC RX REV CODE- 255: Performed by: HOSPITALIST

## 2017-01-01 PROCEDURE — G0299 HHS/HOSPICE OF RN EA 15 MIN: HCPCS

## 2017-01-01 PROCEDURE — 96361 HYDRATE IV INFUSION ADD-ON: CPT | Performed by: EMERGENCY MEDICINE

## 2017-01-01 PROCEDURE — 77030029706: Performed by: ORTHOPAEDIC SURGERY

## 2017-01-01 PROCEDURE — 80053 COMPREHEN METABOLIC PANEL: CPT | Performed by: EMERGENCY MEDICINE

## 2017-01-01 PROCEDURE — 77030027138 HC INCENT SPIROMETER -A

## 2017-01-01 PROCEDURE — 81001 URINALYSIS AUTO W/SCOPE: CPT | Performed by: INTERNAL MEDICINE

## 2017-01-01 PROCEDURE — 65270000029 HC RM PRIVATE

## 2017-01-01 PROCEDURE — 73070 X-RAY EXAM OF ELBOW: CPT

## 2017-01-01 PROCEDURE — 74011250636 HC RX REV CODE- 250/636: Performed by: INTERNAL MEDICINE

## 2017-01-01 PROCEDURE — 77030032490 HC SLV COMPR SCD KNE COVD -B

## 2017-01-01 PROCEDURE — 85025 COMPLETE CBC W/AUTO DIFF WBC: CPT | Performed by: INTERNAL MEDICINE

## 2017-01-01 PROCEDURE — 74011000250 HC RX REV CODE- 250: Performed by: HOSPITALIST

## 2017-01-01 PROCEDURE — 74011250637 HC RX REV CODE- 250/637: Performed by: INTERNAL MEDICINE

## 2017-01-01 PROCEDURE — 74011250636 HC RX REV CODE- 250/636: Performed by: EMERGENCY MEDICINE

## 2017-01-01 PROCEDURE — 74011250637 HC RX REV CODE- 250/637: Performed by: NURSE PRACTITIONER

## 2017-01-01 PROCEDURE — 77030011256 HC DRSG MEPILEX <16IN NO BORD MOLN -A

## 2017-01-01 PROCEDURE — 84484 ASSAY OF TROPONIN QUANT: CPT | Performed by: EMERGENCY MEDICINE

## 2017-01-01 PROCEDURE — 94660 CPAP INITIATION&MGMT: CPT

## 2017-01-01 PROCEDURE — 77030018673: Performed by: ORTHOPAEDIC SURGERY

## 2017-01-01 PROCEDURE — 74011000250 HC RX REV CODE- 250

## 2017-01-01 PROCEDURE — C1713 ANCHOR/SCREW BN/BN,TIS/BN: HCPCS | Performed by: ORTHOPAEDIC SURGERY

## 2017-01-01 PROCEDURE — 99285 EMERGENCY DEPT VISIT HI MDM: CPT

## 2017-01-01 PROCEDURE — 85025 COMPLETE CBC W/AUTO DIFF WBC: CPT | Performed by: NURSE PRACTITIONER

## 2017-01-01 PROCEDURE — 74011250637 HC RX REV CODE- 250/637: Performed by: FAMILY MEDICINE

## 2017-01-01 PROCEDURE — 77030020754 HC CUF TRNQT 2BLA STRY -B: Performed by: ORTHOPAEDIC SURGERY

## 2017-01-01 PROCEDURE — 85025 COMPLETE CBC W/AUTO DIFF WBC: CPT | Performed by: HOSPITALIST

## 2017-01-01 PROCEDURE — 74011250636 HC RX REV CODE- 250/636: Performed by: NURSE PRACTITIONER

## 2017-01-01 PROCEDURE — 74011250637 HC RX REV CODE- 250/637: Performed by: HOSPITALIST

## 2017-01-01 PROCEDURE — 80048 BASIC METABOLIC PNL TOTAL CA: CPT | Performed by: ANESTHESIOLOGY

## 2017-01-01 PROCEDURE — 83735 ASSAY OF MAGNESIUM: CPT | Performed by: EMERGENCY MEDICINE

## 2017-01-01 PROCEDURE — 82803 BLOOD GASES ANY COMBINATION: CPT

## 2017-01-01 PROCEDURE — 76010000173 HC OR TIME 3 TO 3.5 HR INTENSV-TIER 1: Performed by: ORTHOPAEDIC SURGERY

## 2017-01-01 PROCEDURE — 84300 ASSAY OF URINE SODIUM: CPT | Performed by: INTERNAL MEDICINE

## 2017-01-01 PROCEDURE — 74011250637 HC RX REV CODE- 250/637: Performed by: ANESTHESIOLOGY

## 2017-01-01 PROCEDURE — 83880 ASSAY OF NATRIURETIC PEPTIDE: CPT

## 2017-01-01 PROCEDURE — 74011000258 HC RX REV CODE- 258: Performed by: HOSPITALIST

## 2017-01-01 PROCEDURE — 94664 DEMO&/EVAL PT USE INHALER: CPT

## 2017-01-01 PROCEDURE — 76210000006 HC OR PH I REC 0.5 TO 1 HR: Performed by: ORTHOPAEDIC SURGERY

## 2017-01-01 PROCEDURE — 94761 N-INVAS EAR/PLS OXIMETRY MLT: CPT

## 2017-01-01 PROCEDURE — 77030002966 HC SUT PDS J&J -A: Performed by: ORTHOPAEDIC SURGERY

## 2017-01-01 PROCEDURE — 36600 WITHDRAWAL OF ARTERIAL BLOOD: CPT

## 2017-01-01 PROCEDURE — 99285 EMERGENCY DEPT VISIT HI MDM: CPT | Performed by: NURSE PRACTITIONER

## 2017-01-01 PROCEDURE — 0SRS0JZ REPLACEMENT OF LEFT HIP JOINT, FEMORAL SURFACE WITH SYNTHETIC SUBSTITUTE, OPEN APPROACH: ICD-10-PCS | Performed by: ORTHOPAEDIC SURGERY

## 2017-01-01 PROCEDURE — 92526 ORAL FUNCTION THERAPY: CPT | Performed by: SPEECH-LANGUAGE PATHOLOGIST

## 2017-01-01 PROCEDURE — 80053 COMPREHEN METABOLIC PANEL: CPT | Performed by: NURSE PRACTITIONER

## 2017-01-01 PROCEDURE — 83970 ASSAY OF PARATHORMONE: CPT | Performed by: INTERNAL MEDICINE

## 2017-01-01 PROCEDURE — 400013 HH SOC

## 2017-01-01 PROCEDURE — 85025 COMPLETE CBC W/AUTO DIFF WBC: CPT | Performed by: FAMILY MEDICINE

## 2017-01-01 PROCEDURE — 0RSNXZZ REPOSITION RIGHT WRIST JOINT, EXTERNAL APPROACH: ICD-10-PCS | Performed by: ORTHOPAEDIC SURGERY

## 2017-01-01 PROCEDURE — 96361 HYDRATE IV INFUSION ADD-ON: CPT

## 2017-01-01 PROCEDURE — 71250 CT THORAX DX C-: CPT

## 2017-01-01 PROCEDURE — 74011250636 HC RX REV CODE- 250/636

## 2017-01-01 PROCEDURE — 87186 SC STD MICRODIL/AGAR DIL: CPT | Performed by: HOSPITALIST

## 2017-01-01 PROCEDURE — 77030003602 HC NDL NRV BLK BBMI -B: Performed by: ANESTHESIOLOGY

## 2017-01-01 PROCEDURE — 77030006835 HC BLD SAW SAG STRY -B: Performed by: ORTHOPAEDIC SURGERY

## 2017-01-01 PROCEDURE — 73502 X-RAY EXAM HIP UNI 2-3 VIEWS: CPT

## 2017-01-01 PROCEDURE — 0PSL04Z REPOSITION LEFT ULNA WITH INTERNAL FIXATION DEVICE, OPEN APPROACH: ICD-10-PCS | Performed by: ORTHOPAEDIC SURGERY

## 2017-01-01 PROCEDURE — 36430 TRANSFUSION BLD/BLD COMPNT: CPT

## 2017-01-01 PROCEDURE — 97161 PT EVAL LOW COMPLEX 20 MIN: CPT

## 2017-01-01 PROCEDURE — 77030019605

## 2017-01-01 PROCEDURE — 77030011640 HC PAD GRND REM COVD -A: Performed by: ORTHOPAEDIC SURGERY

## 2017-01-01 PROCEDURE — 86923 COMPATIBILITY TEST ELECTRIC: CPT | Performed by: ANESTHESIOLOGY

## 2017-01-01 PROCEDURE — 77030002933 HC SUT MCRYL J&J -A: Performed by: ORTHOPAEDIC SURGERY

## 2017-01-01 PROCEDURE — 0QP704Z REMOVAL OF INTERNAL FIXATION DEVICE FROM LEFT UPPER FEMUR, OPEN APPROACH: ICD-10-PCS | Performed by: ORTHOPAEDIC SURGERY

## 2017-01-01 PROCEDURE — 87186 SC STD MICRODIL/AGAR DIL: CPT | Performed by: EMERGENCY MEDICINE

## 2017-01-01 PROCEDURE — 83880 ASSAY OF NATRIURETIC PEPTIDE: CPT | Performed by: NURSE PRACTITIONER

## 2017-01-01 PROCEDURE — C1776 JOINT DEVICE (IMPLANTABLE): HCPCS | Performed by: ORTHOPAEDIC SURGERY

## 2017-01-01 PROCEDURE — 72125 CT NECK SPINE W/O DYE: CPT

## 2017-01-01 PROCEDURE — 77030010507 HC ADH SKN DERMBND J&J -B: Performed by: ORTHOPAEDIC SURGERY

## 2017-01-01 PROCEDURE — 80053 COMPREHEN METABOLIC PANEL: CPT

## 2017-01-01 PROCEDURE — 93005 ELECTROCARDIOGRAM TRACING: CPT | Performed by: EMERGENCY MEDICINE

## 2017-01-01 PROCEDURE — 76010010054 HC POST OP PAIN BLOCK: Performed by: ORTHOPAEDIC SURGERY

## 2017-01-01 PROCEDURE — 77030007880 HC KT SPN EPDRL BBMI -B: Performed by: ANESTHESIOLOGY

## 2017-01-01 PROCEDURE — 3331090003 HH PPS REVENUE ADJ

## 2017-01-01 PROCEDURE — 83735 ASSAY OF MAGNESIUM: CPT | Performed by: INTERNAL MEDICINE

## 2017-01-01 PROCEDURE — 83880 ASSAY OF NATRIURETIC PEPTIDE: CPT | Performed by: INTERNAL MEDICINE

## 2017-01-01 PROCEDURE — 77030000031 HC BIT DRL QC SYNT -C: Performed by: ORTHOPAEDIC SURGERY

## 2017-01-01 PROCEDURE — 99284 EMERGENCY DEPT VISIT MOD MDM: CPT | Performed by: NURSE PRACTITIONER

## 2017-01-01 PROCEDURE — 93005 ELECTROCARDIOGRAM TRACING: CPT

## 2017-01-01 PROCEDURE — 77030019940 HC BLNKT HYPOTHRM STRY -B: Performed by: ANESTHESIOLOGY

## 2017-01-01 PROCEDURE — G0158 HHC OT ASSISTANT EA 15: HCPCS

## 2017-01-01 PROCEDURE — 85027 COMPLETE CBC AUTOMATED: CPT | Performed by: ANESTHESIOLOGY

## 2017-01-01 PROCEDURE — 96365 THER/PROPH/DIAG IV INF INIT: CPT

## 2017-01-01 PROCEDURE — G0152 HHCP-SERV OF OT,EA 15 MIN: HCPCS

## 2017-01-01 PROCEDURE — 81001 URINALYSIS AUTO W/SCOPE: CPT | Performed by: HOSPITALIST

## 2017-01-01 PROCEDURE — 97110 THERAPEUTIC EXERCISES: CPT

## 2017-01-01 PROCEDURE — 76770 US EXAM ABDO BACK WALL COMP: CPT

## 2017-01-01 PROCEDURE — 81015 MICROSCOPIC EXAM OF URINE: CPT | Performed by: EMERGENCY MEDICINE

## 2017-01-01 PROCEDURE — 74011250636 HC RX REV CODE- 250/636: Performed by: FAMILY MEDICINE

## 2017-01-01 PROCEDURE — 97535 SELF CARE MNGMENT TRAINING: CPT

## 2017-01-01 PROCEDURE — 87086 URINE CULTURE/COLONY COUNT: CPT | Performed by: EMERGENCY MEDICINE

## 2017-01-01 PROCEDURE — 74011000258 HC RX REV CODE- 258: Performed by: NURSE PRACTITIONER

## 2017-01-01 PROCEDURE — 96375 TX/PRO/DX INJ NEW DRUG ADDON: CPT

## 2017-01-01 PROCEDURE — 87088 URINE BACTERIA CULTURE: CPT | Performed by: HOSPITALIST

## 2017-01-01 PROCEDURE — 84484 ASSAY OF TROPONIN QUANT: CPT | Performed by: INTERNAL MEDICINE

## 2017-01-01 PROCEDURE — 96360 HYDRATION IV INFUSION INIT: CPT | Performed by: EMERGENCY MEDICINE

## 2017-01-01 PROCEDURE — 84484 ASSAY OF TROPONIN QUANT: CPT | Performed by: NURSE PRACTITIONER

## 2017-01-01 PROCEDURE — 97166 OT EVAL MOD COMPLEX 45 MIN: CPT

## 2017-01-01 PROCEDURE — 92611 MOTION FLUOROSCOPY/SWALLOW: CPT

## 2017-01-01 PROCEDURE — 99218 HC RM OBSERVATION: CPT

## 2017-01-01 PROCEDURE — 84145 PROCALCITONIN (PCT): CPT | Performed by: ORTHOPAEDIC SURGERY

## 2017-01-01 PROCEDURE — 36415 COLL VENOUS BLD VENIPUNCTURE: CPT | Performed by: NURSE PRACTITIONER

## 2017-01-01 PROCEDURE — 74011250637 HC RX REV CODE- 250/637: Performed by: ORTHOPAEDIC SURGERY

## 2017-01-01 PROCEDURE — 77030029637: Performed by: ORTHOPAEDIC SURGERY

## 2017-01-01 PROCEDURE — 87086 URINE CULTURE/COLONY COUNT: CPT | Performed by: INTERNAL MEDICINE

## 2017-01-01 PROCEDURE — 77030029883 HC RETRV SUT ARTHSCP HOFFE BEAT -B: Performed by: ORTHOPAEDIC SURGERY

## 2017-01-01 PROCEDURE — 85025 COMPLETE CBC W/AUTO DIFF WBC: CPT | Performed by: EMERGENCY MEDICINE

## 2017-01-01 PROCEDURE — 76060000037 HC ANESTHESIA 3 TO 3.5 HR: Performed by: ORTHOPAEDIC SURGERY

## 2017-01-01 PROCEDURE — 80053 COMPREHEN METABOLIC PANEL: CPT | Performed by: FAMILY MEDICINE

## 2017-01-01 PROCEDURE — 82271 OCCULT BLOOD OTHER SOURCES: CPT

## 2017-01-01 PROCEDURE — 36415 COLL VENOUS BLD VENIPUNCTURE: CPT | Performed by: FAMILY MEDICINE

## 2017-01-01 PROCEDURE — 65610000001 HC ROOM ICU GENERAL

## 2017-01-01 PROCEDURE — 97116 GAIT TRAINING THERAPY: CPT

## 2017-01-01 PROCEDURE — 92610 EVALUATE SWALLOWING FUNCTION: CPT

## 2017-01-01 PROCEDURE — 87641 MR-STAPH DNA AMP PROBE: CPT | Performed by: ORTHOPAEDIC SURGERY

## 2017-01-01 PROCEDURE — 84550 ASSAY OF BLOOD/URIC ACID: CPT | Performed by: INTERNAL MEDICINE

## 2017-01-01 PROCEDURE — 80048 BASIC METABOLIC PNL TOTAL CA: CPT | Performed by: NURSE PRACTITIONER

## 2017-01-01 PROCEDURE — 96372 THER/PROPH/DIAG INJ SC/IM: CPT

## 2017-01-01 PROCEDURE — 83735 ASSAY OF MAGNESIUM: CPT | Performed by: HOSPITALIST

## 2017-01-01 PROCEDURE — 77030021668 HC NEB PREFIL KT VYRM -A

## 2017-01-01 PROCEDURE — 97162 PT EVAL MOD COMPLEX 30 MIN: CPT

## 2017-01-01 PROCEDURE — 83735 ASSAY OF MAGNESIUM: CPT

## 2017-01-01 PROCEDURE — 87641 MR-STAPH DNA AMP PROBE: CPT | Performed by: INTERNAL MEDICINE

## 2017-01-01 PROCEDURE — 77030013727 HC IRR FAN PULSVC ZIMM -B: Performed by: ORTHOPAEDIC SURGERY

## 2017-01-01 PROCEDURE — 65390000012 HC CONDITION CODE 44 OBSERVATION

## 2017-01-01 PROCEDURE — 74011000302 HC RX REV CODE- 302: Performed by: NURSE PRACTITIONER

## 2017-01-01 PROCEDURE — 84145 PROCALCITONIN (PCT): CPT | Performed by: NURSE PRACTITIONER

## 2017-01-01 PROCEDURE — 70450 CT HEAD/BRAIN W/O DYE: CPT

## 2017-01-01 PROCEDURE — 76942 ECHO GUIDE FOR BIOPSY: CPT | Performed by: ORTHOPAEDIC SURGERY

## 2017-01-01 PROCEDURE — 87088 URINE BACTERIA CULTURE: CPT | Performed by: EMERGENCY MEDICINE

## 2017-01-01 PROCEDURE — 77030018836 HC SOL IRR NACL ICUM -A: Performed by: ORTHOPAEDIC SURGERY

## 2017-01-01 PROCEDURE — 74011000258 HC RX REV CODE- 258: Performed by: INTERNAL MEDICINE

## 2017-01-01 PROCEDURE — 77030008467 HC STPLR SKN COVD -B: Performed by: ORTHOPAEDIC SURGERY

## 2017-01-01 PROCEDURE — 81015 MICROSCOPIC EXAM OF URINE: CPT | Performed by: NURSE PRACTITIONER

## 2017-01-01 PROCEDURE — 81003 URINALYSIS AUTO W/O SCOPE: CPT | Performed by: NURSE PRACTITIONER

## 2017-01-01 PROCEDURE — P9016 RBC LEUKOCYTES REDUCED: HCPCS | Performed by: ANESTHESIOLOGY

## 2017-01-01 PROCEDURE — 77030002937 HC SUT MERS J&J -B: Performed by: ORTHOPAEDIC SURGERY

## 2017-01-01 PROCEDURE — 30233N1 TRANSFUSION OF NONAUTOLOGOUS RED BLOOD CELLS INTO PERIPHERAL VEIN, PERCUTANEOUS APPROACH: ICD-10-PCS | Performed by: ORTHOPAEDIC SURGERY

## 2017-01-01 PROCEDURE — 87086 URINE CULTURE/COLONY COUNT: CPT | Performed by: HOSPITALIST

## 2017-01-01 PROCEDURE — 74230 X-RAY XM SWLNG FUNCJ C+: CPT

## 2017-01-01 PROCEDURE — 77030016570 HC BLNKT BAIR HGGR 3M -B: Performed by: ANESTHESIOLOGY

## 2017-01-01 PROCEDURE — 87186 SC STD MICRODIL/AGAR DIL: CPT | Performed by: INTERNAL MEDICINE

## 2017-01-01 PROCEDURE — 85025 COMPLETE CBC W/AUTO DIFF WBC: CPT

## 2017-01-01 PROCEDURE — 82570 ASSAY OF URINE CREATININE: CPT | Performed by: INTERNAL MEDICINE

## 2017-01-01 PROCEDURE — 86900 BLOOD TYPING SEROLOGIC ABO: CPT | Performed by: ANESTHESIOLOGY

## 2017-01-01 PROCEDURE — 86580 TB INTRADERMAL TEST: CPT | Performed by: NURSE PRACTITIONER

## 2017-01-01 PROCEDURE — L3650 SO 8 ABD RESTRAINT PRE OTS: HCPCS | Performed by: ORTHOPAEDIC SURGERY

## 2017-01-01 PROCEDURE — 87088 URINE BACTERIA CULTURE: CPT | Performed by: INTERNAL MEDICINE

## 2017-01-01 PROCEDURE — 97165 OT EVAL LOW COMPLEX 30 MIN: CPT

## 2017-01-01 PROCEDURE — 74011250636 HC RX REV CODE- 250/636: Performed by: ANESTHESIOLOGY

## 2017-01-01 DEVICE — SCREW BNE L20MM DIA2.7MM ANK S STL ST VAR ANG LOK FULL THRD: Type: IMPLANTABLE DEVICE | Site: ELBOW | Status: FUNCTIONAL

## 2017-01-01 DEVICE — 3.5MM LOCKING SCREW SLF-TPNG W/STARDRIVE(TM) RECESS 16MM: Type: IMPLANTABLE DEVICE | Site: ELBOW | Status: FUNCTIONAL

## 2017-01-01 DEVICE — IMPLANTABLE DEVICE: Type: IMPLANTABLE DEVICE | Site: ELBOW | Status: FUNCTIONAL

## 2017-01-01 DEVICE — SCREW BNE L30MM DIA2.7MM ANK S STL ST VAR ANG LOK FULL THRD: Type: IMPLANTABLE DEVICE | Site: ELBOW | Status: FUNCTIONAL

## 2017-01-01 DEVICE — SCREW BNE L20MM DIA3.5MM CORT S STL ST LOK FULL THRD: Type: IMPLANTABLE DEVICE | Site: ELBOW | Status: FUNCTIONAL

## 2017-01-01 DEVICE — DBX MIX, 2.5CC
Type: IMPLANTABLE DEVICE | Site: ELBOW | Status: FUNCTIONAL
Brand: DBX®

## 2017-01-01 DEVICE — STEM FEM SZ 13 L135MM NK L38.5MM 135DEG 41.5MM OFFSET STD: Type: IMPLANTABLE DEVICE | Site: HIP | Status: FUNCTIONAL

## 2017-01-01 DEVICE — HEAD FEM DIA28MM +12MM OFFSET HIP ULT STD ARTC EZ 12/14: Type: IMPLANTABLE DEVICE | Site: HIP | Status: FUNCTIONAL

## 2017-01-01 DEVICE — HEAD BPLR OD48MM ID28MM FEM HIP SELF CNTR: Type: IMPLANTABLE DEVICE | Site: HIP | Status: FUNCTIONAL

## 2017-01-01 DEVICE — SCREW BNE L38MM DIA2.7MM S STL ST VAR ANG LOK FULL THRD T8: Type: IMPLANTABLE DEVICE | Site: ELBOW | Status: FUNCTIONAL

## 2017-01-01 DEVICE — SCREW BNE L22MM DIA2.7MM ANK S STL ST VAR ANG LOK FULL THRD: Type: IMPLANTABLE DEVICE | Site: ELBOW | Status: FUNCTIONAL

## 2017-01-01 DEVICE — SCREW BNE L16MM DIA2.7MM ANK S STL ST VAR ANG LOK FULL THRD: Type: IMPLANTABLE DEVICE | Site: ELBOW | Status: FUNCTIONAL

## 2017-01-01 RX ORDER — SODIUM CHLORIDE 0.9 % (FLUSH) 0.9 %
5-10 SYRINGE (ML) INJECTION EVERY 8 HOURS
Status: DISCONTINUED | OUTPATIENT
Start: 2017-01-01 | End: 2017-01-01 | Stop reason: HOSPADM

## 2017-01-01 RX ORDER — ACETAMINOPHEN 325 MG/1
650 TABLET ORAL
Status: DISCONTINUED | OUTPATIENT
Start: 2017-01-01 | End: 2017-01-01 | Stop reason: HOSPADM

## 2017-01-01 RX ORDER — LISINOPRIL 5 MG/1
2.5 TABLET ORAL DAILY
Status: DISCONTINUED | OUTPATIENT
Start: 2017-01-01 | End: 2017-01-01

## 2017-01-01 RX ORDER — DIPHENHYDRAMINE HYDROCHLORIDE 50 MG/ML
12.5 INJECTION, SOLUTION INTRAMUSCULAR; INTRAVENOUS
Status: DISCONTINUED | OUTPATIENT
Start: 2017-01-01 | End: 2017-01-01 | Stop reason: HOSPADM

## 2017-01-01 RX ORDER — HYDROMORPHONE HYDROCHLORIDE 1 MG/ML
0.5 INJECTION, SOLUTION INTRAMUSCULAR; INTRAVENOUS; SUBCUTANEOUS
Status: DISCONTINUED | OUTPATIENT
Start: 2017-01-01 | End: 2017-01-01

## 2017-01-01 RX ORDER — NITROGLYCERIN 0.4 MG/1
0.4 TABLET SUBLINGUAL
Status: DISCONTINUED | OUTPATIENT
Start: 2017-01-01 | End: 2017-01-01 | Stop reason: HOSPADM

## 2017-01-01 RX ORDER — HYDROMORPHONE HYDROCHLORIDE 2 MG/ML
0.5 INJECTION, SOLUTION INTRAMUSCULAR; INTRAVENOUS; SUBCUTANEOUS
Status: DISCONTINUED | OUTPATIENT
Start: 2017-01-01 | End: 2017-01-01 | Stop reason: HOSPADM

## 2017-01-01 RX ORDER — LEVOFLOXACIN 5 MG/ML
750 INJECTION, SOLUTION INTRAVENOUS
Status: COMPLETED | OUTPATIENT
Start: 2017-01-01 | End: 2017-01-01

## 2017-01-01 RX ORDER — LANOLIN ALCOHOL/MO/W.PET/CERES
325 CREAM (GRAM) TOPICAL
Status: DISCONTINUED | OUTPATIENT
Start: 2017-01-01 | End: 2017-01-01 | Stop reason: HOSPADM

## 2017-01-01 RX ORDER — CLOPIDOGREL BISULFATE 75 MG/1
75 TABLET ORAL DAILY
Status: DISCONTINUED | OUTPATIENT
Start: 2017-01-01 | End: 2017-01-01 | Stop reason: HOSPADM

## 2017-01-01 RX ORDER — SODIUM CHLORIDE 9 MG/ML
75 INJECTION, SOLUTION INTRAVENOUS CONTINUOUS
Status: DISCONTINUED | OUTPATIENT
Start: 2017-01-01 | End: 2017-01-01

## 2017-01-01 RX ORDER — SODIUM CHLORIDE, SODIUM LACTATE, POTASSIUM CHLORIDE, CALCIUM CHLORIDE 600; 310; 30; 20 MG/100ML; MG/100ML; MG/100ML; MG/100ML
75 INJECTION, SOLUTION INTRAVENOUS CONTINUOUS
Status: DISCONTINUED | OUTPATIENT
Start: 2017-01-01 | End: 2017-01-01 | Stop reason: HOSPADM

## 2017-01-01 RX ORDER — MIDAZOLAM HYDROCHLORIDE 1 MG/ML
2 INJECTION, SOLUTION INTRAMUSCULAR; INTRAVENOUS ONCE
Status: COMPLETED | OUTPATIENT
Start: 2017-01-01 | End: 2017-01-01

## 2017-01-01 RX ORDER — LINEZOLID 600 MG/1
600 TABLET, FILM COATED ORAL EVERY 12 HOURS
Status: DISCONTINUED | OUTPATIENT
Start: 2017-01-01 | End: 2017-01-01

## 2017-01-01 RX ORDER — HEPARIN SODIUM 5000 [USP'U]/ML
5000 INJECTION, SOLUTION INTRAVENOUS; SUBCUTANEOUS EVERY 8 HOURS
Status: DISCONTINUED | OUTPATIENT
Start: 2017-01-01 | End: 2017-01-01 | Stop reason: HOSPADM

## 2017-01-01 RX ORDER — LEVOFLOXACIN 750 MG/1
750 TABLET ORAL
Qty: 4 TAB | Refills: 0 | Status: SHIPPED | OUTPATIENT
Start: 2017-01-01 | End: 2017-01-01

## 2017-01-01 RX ORDER — ASPIRIN 325 MG
325 TABLET ORAL 2 TIMES DAILY
Status: DISCONTINUED | OUTPATIENT
Start: 2017-01-01 | End: 2017-01-01 | Stop reason: HOSPADM

## 2017-01-01 RX ORDER — OXYCODONE HYDROCHLORIDE 5 MG/1
10 TABLET ORAL
Status: DISCONTINUED | OUTPATIENT
Start: 2017-01-01 | End: 2017-01-01 | Stop reason: HOSPADM

## 2017-01-01 RX ORDER — SODIUM CHLORIDE, SODIUM LACTATE, POTASSIUM CHLORIDE, CALCIUM CHLORIDE 600; 310; 30; 20 MG/100ML; MG/100ML; MG/100ML; MG/100ML
75 INJECTION, SOLUTION INTRAVENOUS CONTINUOUS
Status: DISCONTINUED | OUTPATIENT
Start: 2017-01-01 | End: 2017-01-01

## 2017-01-01 RX ORDER — ACETAMINOPHEN 325 MG/1
650 TABLET ORAL EVERY 8 HOURS
Status: DISCONTINUED | OUTPATIENT
Start: 2017-01-01 | End: 2017-01-01 | Stop reason: HOSPADM

## 2017-01-01 RX ORDER — BISACODYL 5 MG
5 TABLET, DELAYED RELEASE (ENTERIC COATED) ORAL DAILY PRN
Status: DISCONTINUED | OUTPATIENT
Start: 2017-01-01 | End: 2017-01-01 | Stop reason: HOSPADM

## 2017-01-01 RX ORDER — ONDANSETRON 2 MG/ML
4 INJECTION INTRAMUSCULAR; INTRAVENOUS
Status: DISCONTINUED | OUTPATIENT
Start: 2017-01-01 | End: 2017-01-01 | Stop reason: HOSPADM

## 2017-01-01 RX ORDER — SODIUM CHLORIDE 0.9 % (FLUSH) 0.9 %
5-10 SYRINGE (ML) INJECTION AS NEEDED
Status: DISCONTINUED | OUTPATIENT
Start: 2017-01-01 | End: 2017-01-01 | Stop reason: HOSPADM

## 2017-01-01 RX ORDER — ALBUTEROL SULFATE 0.83 MG/ML
2.5 SOLUTION RESPIRATORY (INHALATION) AS NEEDED
Status: DISCONTINUED | OUTPATIENT
Start: 2017-01-01 | End: 2017-01-01 | Stop reason: HOSPADM

## 2017-01-01 RX ORDER — TRAMADOL HYDROCHLORIDE 50 MG/1
50 TABLET ORAL
Status: DISCONTINUED | OUTPATIENT
Start: 2017-01-01 | End: 2017-01-01 | Stop reason: HOSPADM

## 2017-01-01 RX ORDER — ZOLPIDEM TARTRATE 5 MG/1
5 TABLET ORAL
Status: DISCONTINUED | OUTPATIENT
Start: 2017-01-01 | End: 2017-01-01 | Stop reason: HOSPADM

## 2017-01-01 RX ORDER — TRAMADOL HYDROCHLORIDE 50 MG/1
50 TABLET ORAL
Status: COMPLETED | OUTPATIENT
Start: 2017-01-01 | End: 2017-01-01

## 2017-01-01 RX ORDER — FENTANYL CITRATE 50 UG/ML
100 INJECTION, SOLUTION INTRAMUSCULAR; INTRAVENOUS ONCE
Status: DISCONTINUED | OUTPATIENT
Start: 2017-01-01 | End: 2017-01-01 | Stop reason: HOSPADM

## 2017-01-01 RX ORDER — ALBUMIN HUMAN 250 G/1000ML
12.5 SOLUTION INTRAVENOUS EVERY 6 HOURS
Status: DISCONTINUED | OUTPATIENT
Start: 2017-01-01 | End: 2017-01-01

## 2017-01-01 RX ORDER — LORAZEPAM 0.5 MG/1
0.5 TABLET ORAL
Status: DISCONTINUED | OUTPATIENT
Start: 2017-01-01 | End: 2017-01-01 | Stop reason: HOSPADM

## 2017-01-01 RX ORDER — SODIUM CHLORIDE 9 MG/ML
50 INJECTION, SOLUTION INTRAVENOUS CONTINUOUS
Status: DISCONTINUED | OUTPATIENT
Start: 2017-01-01 | End: 2017-01-01

## 2017-01-01 RX ORDER — LIDOCAINE HYDROCHLORIDE 10 MG/ML
0.1 INJECTION INFILTRATION; PERINEURAL AS NEEDED
Status: DISCONTINUED | OUTPATIENT
Start: 2017-01-01 | End: 2017-01-01 | Stop reason: HOSPADM

## 2017-01-01 RX ORDER — IPRATROPIUM BROMIDE AND ALBUTEROL SULFATE 2.5; .5 MG/3ML; MG/3ML
3 SOLUTION RESPIRATORY (INHALATION)
Status: COMPLETED | OUTPATIENT
Start: 2017-01-01 | End: 2017-01-01

## 2017-01-01 RX ORDER — METRONIDAZOLE 500 MG/1
500 TABLET ORAL EVERY 12 HOURS
Status: DISCONTINUED | OUTPATIENT
Start: 2017-01-01 | End: 2017-01-01

## 2017-01-01 RX ORDER — ENOXAPARIN SODIUM 100 MG/ML
30 INJECTION SUBCUTANEOUS EVERY 24 HOURS
Status: DISCONTINUED | OUTPATIENT
Start: 2017-01-01 | End: 2017-01-01 | Stop reason: HOSPADM

## 2017-01-01 RX ORDER — MUPIROCIN 20 MG/G
OINTMENT TOPICAL 2 TIMES DAILY
Status: DISCONTINUED | OUTPATIENT
Start: 2017-01-01 | End: 2017-01-01 | Stop reason: HOSPADM

## 2017-01-01 RX ORDER — ENOXAPARIN SODIUM 100 MG/ML
30 INJECTION SUBCUTANEOUS EVERY 24 HOURS
Qty: 28 SYRINGE | Refills: 0 | Status: SHIPPED
Start: 2017-01-01 | End: 2017-01-01

## 2017-01-01 RX ORDER — ONDANSETRON 2 MG/ML
4 INJECTION INTRAMUSCULAR; INTRAVENOUS
Status: COMPLETED | OUTPATIENT
Start: 2017-01-01 | End: 2017-01-01

## 2017-01-01 RX ORDER — ONDANSETRON 2 MG/ML
4 INJECTION INTRAMUSCULAR; INTRAVENOUS ONCE
Status: DISCONTINUED | OUTPATIENT
Start: 2017-01-01 | End: 2017-01-01 | Stop reason: HOSPADM

## 2017-01-01 RX ORDER — MELATONIN
5000 DAILY
Status: DISCONTINUED | OUTPATIENT
Start: 2017-01-01 | End: 2017-01-01 | Stop reason: HOSPADM

## 2017-01-01 RX ORDER — SODIUM CHLORIDE 450 MG/100ML
75 INJECTION, SOLUTION INTRAVENOUS CONTINUOUS
Status: DISCONTINUED | OUTPATIENT
Start: 2017-01-01 | End: 2017-01-01

## 2017-01-01 RX ORDER — NALOXONE HYDROCHLORIDE 0.4 MG/ML
0.4 INJECTION, SOLUTION INTRAMUSCULAR; INTRAVENOUS; SUBCUTANEOUS AS NEEDED
Status: DISCONTINUED | OUTPATIENT
Start: 2017-01-01 | End: 2017-01-01 | Stop reason: HOSPADM

## 2017-01-01 RX ORDER — SODIUM CHLORIDE 9 MG/ML
125 INJECTION, SOLUTION INTRAVENOUS CONTINUOUS
Status: DISCONTINUED | OUTPATIENT
Start: 2017-01-01 | End: 2017-01-01 | Stop reason: HOSPADM

## 2017-01-01 RX ORDER — NALOXONE HYDROCHLORIDE 0.4 MG/ML
0.1 INJECTION, SOLUTION INTRAMUSCULAR; INTRAVENOUS; SUBCUTANEOUS AS NEEDED
Status: DISCONTINUED | OUTPATIENT
Start: 2017-01-01 | End: 2017-01-01 | Stop reason: HOSPADM

## 2017-01-01 RX ORDER — ASPIRIN 81 MG/1
81 TABLET ORAL DAILY
Status: DISCONTINUED | OUTPATIENT
Start: 2017-01-01 | End: 2017-01-01 | Stop reason: HOSPADM

## 2017-01-01 RX ORDER — METRONIDAZOLE 500 MG/100ML
500 INJECTION, SOLUTION INTRAVENOUS EVERY 12 HOURS
Status: DISCONTINUED | OUTPATIENT
Start: 2017-01-01 | End: 2017-01-01 | Stop reason: ALTCHOICE

## 2017-01-01 RX ORDER — LISINOPRIL 5 MG/1
2.5 TABLET ORAL DAILY
Status: DISCONTINUED | OUTPATIENT
Start: 2017-01-01 | End: 2017-01-01 | Stop reason: HOSPADM

## 2017-01-01 RX ORDER — PROPOFOL 10 MG/ML
INJECTION, EMULSION INTRAVENOUS
Status: DISCONTINUED | OUTPATIENT
Start: 2017-01-01 | End: 2017-01-01 | Stop reason: HOSPADM

## 2017-01-01 RX ORDER — GUAIFENESIN/DEXTROMETHORPHAN 100-10MG/5
10 SYRUP ORAL
Status: DISCONTINUED | OUTPATIENT
Start: 2017-01-01 | End: 2017-01-01 | Stop reason: HOSPADM

## 2017-01-01 RX ORDER — CEFAZOLIN SODIUM IN 0.9 % NACL 2 G/50 ML
2 INTRAVENOUS SOLUTION, PIGGYBACK (ML) INTRAVENOUS
Status: COMPLETED | OUTPATIENT
Start: 2017-01-01 | End: 2017-01-01

## 2017-01-01 RX ORDER — CARVEDILOL 3.12 MG/1
3.12 TABLET ORAL DAILY
Status: DISCONTINUED | OUTPATIENT
Start: 2017-01-01 | End: 2017-01-01 | Stop reason: HOSPADM

## 2017-01-01 RX ORDER — HYDROCODONE BITARTRATE AND ACETAMINOPHEN 5; 325 MG/1; MG/1
1 TABLET ORAL
Status: DISCONTINUED | OUTPATIENT
Start: 2017-01-01 | End: 2017-01-01 | Stop reason: HOSPADM

## 2017-01-01 RX ORDER — OXYCODONE HYDROCHLORIDE 5 MG/1
5 TABLET ORAL
Status: DISCONTINUED | OUTPATIENT
Start: 2017-01-01 | End: 2017-01-01 | Stop reason: HOSPADM

## 2017-01-01 RX ORDER — POTASSIUM CHLORIDE 14.9 MG/ML
20 INJECTION INTRAVENOUS ONCE
Status: COMPLETED | OUTPATIENT
Start: 2017-01-01 | End: 2017-01-01

## 2017-01-01 RX ORDER — ACETAMINOPHEN 325 MG/1
650 TABLET ORAL EVERY 8 HOURS
Qty: 90 TAB | Refills: 0 | Status: SHIPPED
Start: 2017-01-01 | End: 2017-01-01

## 2017-01-01 RX ORDER — DIGOXIN 125 MCG
0.12 TABLET ORAL DAILY
Status: DISCONTINUED | OUTPATIENT
Start: 2017-01-01 | End: 2017-01-01 | Stop reason: HOSPADM

## 2017-01-01 RX ORDER — TRAZODONE HYDROCHLORIDE 50 MG/1
25 TABLET ORAL
Status: DISCONTINUED | OUTPATIENT
Start: 2017-01-01 | End: 2017-01-01 | Stop reason: HOSPADM

## 2017-01-01 RX ORDER — FUROSEMIDE 10 MG/ML
20 INJECTION INTRAMUSCULAR; INTRAVENOUS
Status: COMPLETED | OUTPATIENT
Start: 2017-01-01 | End: 2017-01-01

## 2017-01-01 RX ORDER — AMIODARONE HYDROCHLORIDE 200 MG/1
200 TABLET ORAL DAILY
Status: DISCONTINUED | OUTPATIENT
Start: 2017-01-01 | End: 2017-01-01 | Stop reason: HOSPADM

## 2017-01-01 RX ORDER — DOCUSATE SODIUM 100 MG/1
100 CAPSULE, LIQUID FILLED ORAL 2 TIMES DAILY
Qty: 60 CAP | Refills: 2 | Status: SHIPPED
Start: 2017-01-01 | End: 2017-01-01

## 2017-01-01 RX ORDER — OXYCODONE HYDROCHLORIDE 10 MG/1
5 TABLET ORAL
Qty: 40 TAB | Refills: 0 | Status: SHIPPED
Start: 2017-01-01 | End: 2017-01-01

## 2017-01-01 RX ORDER — MAG HYDROX/ALUMINUM HYD/SIMETH 200-200-20
30 SUSPENSION, ORAL (FINAL DOSE FORM) ORAL
Status: DISCONTINUED | OUTPATIENT
Start: 2017-01-01 | End: 2017-01-01 | Stop reason: HOSPADM

## 2017-01-01 RX ORDER — SODIUM CHLORIDE 9 MG/ML
250 INJECTION, SOLUTION INTRAVENOUS AS NEEDED
Status: DISCONTINUED | OUTPATIENT
Start: 2017-01-01 | End: 2017-01-01 | Stop reason: HOSPADM

## 2017-01-01 RX ORDER — ROPIVACAINE HYDROCHLORIDE 5 MG/ML
INJECTION, SOLUTION EPIDURAL; INFILTRATION; PERINEURAL AS NEEDED
Status: DISCONTINUED | OUTPATIENT
Start: 2017-01-01 | End: 2017-01-01 | Stop reason: HOSPADM

## 2017-01-01 RX ORDER — AMOXICILLIN 250 MG
1 CAPSULE ORAL
Status: DISCONTINUED | OUTPATIENT
Start: 2017-01-01 | End: 2017-01-01 | Stop reason: HOSPADM

## 2017-01-01 RX ORDER — LORAZEPAM 1 MG/1
1 TABLET ORAL
Status: DISCONTINUED | OUTPATIENT
Start: 2017-01-01 | End: 2017-01-01 | Stop reason: HOSPADM

## 2017-01-01 RX ORDER — AMIODARONE HYDROCHLORIDE 200 MG/1
100 TABLET ORAL DAILY
Status: DISCONTINUED | OUTPATIENT
Start: 2017-01-01 | End: 2017-01-01 | Stop reason: HOSPADM

## 2017-01-01 RX ORDER — IPRATROPIUM BROMIDE AND ALBUTEROL SULFATE 2.5; .5 MG/3ML; MG/3ML
3 SOLUTION RESPIRATORY (INHALATION)
Status: DISCONTINUED | OUTPATIENT
Start: 2017-01-01 | End: 2017-01-01 | Stop reason: HOSPADM

## 2017-01-01 RX ORDER — FERROUS SULFATE, DRIED 160(50) MG
1 TABLET, EXTENDED RELEASE ORAL
Qty: 90 TAB | Refills: 0 | Status: SHIPPED
Start: 2017-01-01 | End: 2017-01-01

## 2017-01-01 RX ORDER — SPIRONOLACTONE 25 MG/1
25 TABLET ORAL DAILY
Status: DISCONTINUED | OUTPATIENT
Start: 2017-01-01 | End: 2017-01-01 | Stop reason: HOSPADM

## 2017-01-01 RX ORDER — CEPHALEXIN 500 MG/1
500 CAPSULE ORAL 4 TIMES DAILY
Qty: 28 CAP | Refills: 0 | Status: SHIPPED | OUTPATIENT
Start: 2017-01-01 | End: 2017-01-01

## 2017-01-01 RX ORDER — AMIODARONE HYDROCHLORIDE 200 MG/1
100 TABLET ORAL
Status: DISCONTINUED | OUTPATIENT
Start: 2017-01-01 | End: 2017-01-01 | Stop reason: HOSPADM

## 2017-01-01 RX ORDER — DOCUSATE SODIUM 100 MG/1
100 CAPSULE, LIQUID FILLED ORAL 2 TIMES DAILY
Status: DISCONTINUED | OUTPATIENT
Start: 2017-01-01 | End: 2017-01-01 | Stop reason: HOSPADM

## 2017-01-01 RX ORDER — FERROUS SULFATE, DRIED 160(50) MG
1 TABLET, EXTENDED RELEASE ORAL
Status: DISCONTINUED | OUTPATIENT
Start: 2017-01-01 | End: 2017-01-01 | Stop reason: HOSPADM

## 2017-01-01 RX ORDER — MIDAZOLAM HYDROCHLORIDE 1 MG/ML
2 INJECTION, SOLUTION INTRAMUSCULAR; INTRAVENOUS
Status: DISCONTINUED | OUTPATIENT
Start: 2017-01-01 | End: 2017-01-01 | Stop reason: HOSPADM

## 2017-01-01 RX ORDER — LEVOFLOXACIN 5 MG/ML
750 INJECTION, SOLUTION INTRAVENOUS
Status: DISCONTINUED | OUTPATIENT
Start: 2017-01-01 | End: 2017-01-01

## 2017-01-01 RX ORDER — LEVOFLOXACIN 5 MG/ML
750 INJECTION, SOLUTION INTRAVENOUS
Status: DISCONTINUED | OUTPATIENT
Start: 2017-01-01 | End: 2017-01-01 | Stop reason: HOSPADM

## 2017-01-01 RX ORDER — SODIUM CHLORIDE, SODIUM LACTATE, POTASSIUM CHLORIDE, CALCIUM CHLORIDE 600; 310; 30; 20 MG/100ML; MG/100ML; MG/100ML; MG/100ML
75 INJECTION, SOLUTION INTRAVENOUS
Status: DISCONTINUED | OUTPATIENT
Start: 2017-01-01 | End: 2017-01-01 | Stop reason: HOSPADM

## 2017-01-01 RX ORDER — ALBUMIN HUMAN 250 G/1000ML
25 SOLUTION INTRAVENOUS EVERY 12 HOURS
Status: DISCONTINUED | OUTPATIENT
Start: 2017-01-01 | End: 2017-01-01

## 2017-01-01 RX ORDER — PROPOFOL 10 MG/ML
INJECTION, EMULSION INTRAVENOUS AS NEEDED
Status: DISCONTINUED | OUTPATIENT
Start: 2017-01-01 | End: 2017-01-01 | Stop reason: HOSPADM

## 2017-01-01 RX ORDER — CARVEDILOL 3.12 MG/1
3.12 TABLET ORAL 2 TIMES DAILY WITH MEALS
Status: DISCONTINUED | OUTPATIENT
Start: 2017-01-01 | End: 2017-01-01

## 2017-01-01 RX ORDER — FUROSEMIDE 40 MG/1
40 TABLET ORAL DAILY
Status: DISCONTINUED | OUTPATIENT
Start: 2017-01-01 | End: 2017-01-01

## 2017-01-01 RX ADMIN — AMIODARONE HYDROCHLORIDE 200 MG: 200 TABLET ORAL at 08:00

## 2017-01-01 RX ADMIN — LINEZOLID 600 MG: 600 TABLET, FILM COATED ORAL at 20:52

## 2017-01-01 RX ADMIN — SODIUM CHLORIDE, SODIUM LACTATE, POTASSIUM CHLORIDE, AND CALCIUM CHLORIDE 75 ML/HR: 600; 310; 30; 20 INJECTION, SOLUTION INTRAVENOUS at 14:54

## 2017-01-01 RX ADMIN — Medication 5 ML: at 21:53

## 2017-01-01 RX ADMIN — ASPIRIN 325 MG ORAL TABLET 325 MG: 325 PILL ORAL at 08:06

## 2017-01-01 RX ADMIN — ACETAMINOPHEN 650 MG: 325 TABLET, FILM COATED ORAL at 21:50

## 2017-01-01 RX ADMIN — HEPARIN SODIUM 5000 UNITS: 5000 INJECTION, SOLUTION INTRAVENOUS; SUBCUTANEOUS at 01:49

## 2017-01-01 RX ADMIN — HEPARIN SODIUM 5000 UNITS: 5000 INJECTION, SOLUTION INTRAVENOUS; SUBCUTANEOUS at 10:03

## 2017-01-01 RX ADMIN — CEFTRIAXONE 1 G: 1 INJECTION, POWDER, FOR SOLUTION INTRAMUSCULAR; INTRAVENOUS at 22:45

## 2017-01-01 RX ADMIN — DOCUSATE SODIUM 100 MG: 100 CAPSULE, LIQUID FILLED ORAL at 08:06

## 2017-01-01 RX ADMIN — CEFAZOLIN 2 G: 1 INJECTION, POWDER, FOR SOLUTION INTRAMUSCULAR; INTRAVENOUS; PARENTERAL at 14:06

## 2017-01-01 RX ADMIN — SODIUM CHLORIDE 50 ML/HR: 450 INJECTION, SOLUTION INTRAVENOUS at 09:20

## 2017-01-01 RX ADMIN — ALBUMIN (HUMAN) 12.5 G: 0.25 INJECTION, SOLUTION INTRAVENOUS at 18:14

## 2017-01-01 RX ADMIN — CARVEDILOL 3.12 MG: 3.12 TABLET, FILM COATED ORAL at 09:00

## 2017-01-01 RX ADMIN — IPRATROPIUM BROMIDE AND ALBUTEROL SULFATE 3 ML: 2.5; .5 SOLUTION RESPIRATORY (INHALATION) at 07:42

## 2017-01-01 RX ADMIN — CALCIUM CARBONATE 500 MG (1,250 MG)-VITAMIN D3 200 UNIT TABLET 1 TABLET: at 18:23

## 2017-01-01 RX ADMIN — IPRATROPIUM BROMIDE AND ALBUTEROL SULFATE 3 ML: 2.5; .5 SOLUTION RESPIRATORY (INHALATION) at 20:45

## 2017-01-01 RX ADMIN — FUROSEMIDE 40 MG: 40 TABLET ORAL at 09:38

## 2017-01-01 RX ADMIN — BARIUM SULFATE 15 ML: 400 PASTE ORAL at 13:30

## 2017-01-01 RX ADMIN — ONDANSETRON 4 MG: 2 INJECTION INTRAMUSCULAR; INTRAVENOUS at 12:11

## 2017-01-01 RX ADMIN — MUPIROCIN: 20 OINTMENT TOPICAL at 08:12

## 2017-01-01 RX ADMIN — LEVOFLOXACIN 750 MG: 5 INJECTION, SOLUTION INTRAVENOUS at 17:50

## 2017-01-01 RX ADMIN — ALBUMIN (HUMAN) 12.5 G: 0.25 INJECTION, SOLUTION INTRAVENOUS at 06:06

## 2017-01-01 RX ADMIN — METRONIDAZOLE 500 MG: 500 TABLET ORAL at 21:50

## 2017-01-01 RX ADMIN — TRAMADOL HYDROCHLORIDE 50 MG: 50 TABLET, FILM COATED ORAL at 21:24

## 2017-01-01 RX ADMIN — TRAZODONE HYDROCHLORIDE 25 MG: 50 TABLET ORAL at 21:18

## 2017-01-01 RX ADMIN — FUROSEMIDE 20 MG: 10 INJECTION, SOLUTION INTRAMUSCULAR; INTRAVENOUS at 13:16

## 2017-01-01 RX ADMIN — SODIUM CHLORIDE 50 ML/HR: 900 INJECTION, SOLUTION INTRAVENOUS at 16:29

## 2017-01-01 RX ADMIN — OXYCODONE HYDROCHLORIDE 10 MG: 5 TABLET ORAL at 05:33

## 2017-01-01 RX ADMIN — CARVEDILOL 3.12 MG: 3.12 TABLET, FILM COATED ORAL at 10:03

## 2017-01-01 RX ADMIN — HEPARIN SODIUM 5000 UNITS: 5000 INJECTION, SOLUTION INTRAVENOUS; SUBCUTANEOUS at 01:45

## 2017-01-01 RX ADMIN — DOCUSATE SODIUM 100 MG: 100 CAPSULE, LIQUID FILLED ORAL at 18:23

## 2017-01-01 RX ADMIN — TRAZODONE HYDROCHLORIDE 25 MG: 50 TABLET ORAL at 23:59

## 2017-01-01 RX ADMIN — MUPIROCIN: 20 OINTMENT TOPICAL at 18:23

## 2017-01-01 RX ADMIN — AMIODARONE HYDROCHLORIDE 100 MG: 200 TABLET ORAL at 09:02

## 2017-01-01 RX ADMIN — ACETAMINOPHEN 650 MG: 325 TABLET, FILM COATED ORAL at 21:18

## 2017-01-01 RX ADMIN — PROPOFOL 10 MG: 10 INJECTION, EMULSION INTRAVENOUS at 13:53

## 2017-01-01 RX ADMIN — GUAIFENESIN AND DEXTROMETHORPHAN 10 ML: 100; 10 SYRUP ORAL at 19:42

## 2017-01-01 RX ADMIN — METRONIDAZOLE 500 MG: 500 TABLET ORAL at 08:40

## 2017-01-01 RX ADMIN — Medication 10 ML: at 06:40

## 2017-01-01 RX ADMIN — ENOXAPARIN SODIUM 30 MG: 30 INJECTION SUBCUTANEOUS at 14:01

## 2017-01-01 RX ADMIN — IPRATROPIUM BROMIDE AND ALBUTEROL SULFATE 3 ML: 2.5; .5 SOLUTION RESPIRATORY (INHALATION) at 02:09

## 2017-01-01 RX ADMIN — ASPIRIN 325 MG ORAL TABLET 325 MG: 325 PILL ORAL at 08:00

## 2017-01-01 RX ADMIN — ASPIRIN 81 MG: 81 TABLET, COATED ORAL at 08:31

## 2017-01-01 RX ADMIN — CLOPIDOGREL BISULFATE 75 MG: 75 TABLET ORAL at 09:02

## 2017-01-01 RX ADMIN — GUAIFENESIN AND DEXTROMETHORPHAN 10 ML: 100; 10 SYRUP ORAL at 10:02

## 2017-01-01 RX ADMIN — OXYCODONE HYDROCHLORIDE 10 MG: 5 TABLET ORAL at 00:01

## 2017-01-01 RX ADMIN — Medication 5 ML: at 06:04

## 2017-01-01 RX ADMIN — ACETAMINOPHEN 650 MG: 325 TABLET, FILM COATED ORAL at 06:00

## 2017-01-01 RX ADMIN — IPRATROPIUM BROMIDE AND ALBUTEROL SULFATE 3 ML: 2.5; .5 SOLUTION RESPIRATORY (INHALATION) at 20:02

## 2017-01-01 RX ADMIN — ASPIRIN 325 MG ORAL TABLET 325 MG: 325 PILL ORAL at 09:37

## 2017-01-01 RX ADMIN — CARVEDILOL 3.12 MG: 3.12 TABLET, FILM COATED ORAL at 09:37

## 2017-01-01 RX ADMIN — IPRATROPIUM BROMIDE AND ALBUTEROL SULFATE 3 ML: 2.5; .5 SOLUTION RESPIRATORY (INHALATION) at 19:51

## 2017-01-01 RX ADMIN — HEPARIN SODIUM 5000 UNITS: 5000 INJECTION, SOLUTION INTRAVENOUS; SUBCUTANEOUS at 01:08

## 2017-01-01 RX ADMIN — Medication 5 ML: at 05:47

## 2017-01-01 RX ADMIN — HYDROMORPHONE HYDROCHLORIDE 0.5 MG: 1 INJECTION, SOLUTION INTRAMUSCULAR; INTRAVENOUS; SUBCUTANEOUS at 06:17

## 2017-01-01 RX ADMIN — AMIODARONE HYDROCHLORIDE 100 MG: 200 TABLET ORAL at 08:46

## 2017-01-01 RX ADMIN — ASPIRIN 325 MG ORAL TABLET 325 MG: 325 PILL ORAL at 18:22

## 2017-01-01 RX ADMIN — PROPOFOL 20 MG: 10 INJECTION, EMULSION INTRAVENOUS at 13:50

## 2017-01-01 RX ADMIN — DIGOXIN 0.12 MG: 125 TABLET ORAL at 08:06

## 2017-01-01 RX ADMIN — POTASSIUM CHLORIDE 20 MEQ: 14.9 INJECTION, SOLUTION INTRAVENOUS at 23:58

## 2017-01-01 RX ADMIN — GUAIFENESIN AND DEXTROMETHORPHAN 10 ML: 100; 10 SYRUP ORAL at 10:00

## 2017-01-01 RX ADMIN — ALBUMIN (HUMAN) 12.5 G: 0.25 INJECTION, SOLUTION INTRAVENOUS at 00:44

## 2017-01-01 RX ADMIN — Medication 10 ML: at 13:03

## 2017-01-01 RX ADMIN — IPRATROPIUM BROMIDE AND ALBUTEROL SULFATE 3 ML: 2.5; .5 SOLUTION RESPIRATORY (INHALATION) at 06:19

## 2017-01-01 RX ADMIN — SODIUM CHLORIDE, SODIUM LACTATE, POTASSIUM CHLORIDE, AND CALCIUM CHLORIDE 75 ML/HR: 600; 310; 30; 20 INJECTION, SOLUTION INTRAVENOUS at 11:05

## 2017-01-01 RX ADMIN — CEFTRIAXONE 2 G: 2 INJECTION, POWDER, FOR SOLUTION INTRAMUSCULAR; INTRAVENOUS at 11:24

## 2017-01-01 RX ADMIN — MUPIROCIN: 20 OINTMENT TOPICAL at 08:00

## 2017-01-01 RX ADMIN — Medication 5 ML: at 05:20

## 2017-01-01 RX ADMIN — HEPARIN SODIUM 5000 UNITS: 5000 INJECTION, SOLUTION INTRAVENOUS; SUBCUTANEOUS at 17:40

## 2017-01-01 RX ADMIN — DIGOXIN 0.12 MG: 125 TABLET ORAL at 08:00

## 2017-01-01 RX ADMIN — FERROUS SULFATE TAB 325 MG (65 MG ELEMENTAL FE) 325 MG: 325 (65 FE) TAB at 09:02

## 2017-01-01 RX ADMIN — CALCIUM CARBONATE 500 MG (1,250 MG)-VITAMIN D3 200 UNIT TABLET 1 TABLET: at 08:00

## 2017-01-01 RX ADMIN — HEPARIN SODIUM 5000 UNITS: 5000 INJECTION, SOLUTION INTRAVENOUS; SUBCUTANEOUS at 17:46

## 2017-01-01 RX ADMIN — ACETAMINOPHEN 650 MG: 325 TABLET, FILM COATED ORAL at 05:33

## 2017-01-01 RX ADMIN — ENOXAPARIN SODIUM 30 MG: 30 INJECTION SUBCUTANEOUS at 14:49

## 2017-01-01 RX ADMIN — HEPARIN SODIUM 5000 UNITS: 5000 INJECTION, SOLUTION INTRAVENOUS; SUBCUTANEOUS at 22:46

## 2017-01-01 RX ADMIN — MUPIROCIN: 20 OINTMENT TOPICAL at 17:58

## 2017-01-01 RX ADMIN — DOCUSATE SODIUM 100 MG: 100 CAPSULE, LIQUID FILLED ORAL at 08:00

## 2017-01-01 RX ADMIN — AMIODARONE HYDROCHLORIDE 100 MG: 200 TABLET ORAL at 10:03

## 2017-01-01 RX ADMIN — GUAIFENESIN AND DEXTROMETHORPHAN 10 ML: 100; 10 SYRUP ORAL at 20:52

## 2017-01-01 RX ADMIN — SODIUM CHLORIDE 75 ML/HR: 450 INJECTION, SOLUTION INTRAVENOUS at 17:47

## 2017-01-01 RX ADMIN — ACETAMINOPHEN 650 MG: 325 TABLET, FILM COATED ORAL at 13:29

## 2017-01-01 RX ADMIN — ACETAMINOPHEN 650 MG: 325 TABLET, FILM COATED ORAL at 20:06

## 2017-01-01 RX ADMIN — Medication 10 ML: at 06:15

## 2017-01-01 RX ADMIN — ASPIRIN 81 MG: 81 TABLET, COATED ORAL at 09:02

## 2017-01-01 RX ADMIN — Medication 10 ML: at 23:59

## 2017-01-01 RX ADMIN — Medication 10 ML: at 22:12

## 2017-01-01 RX ADMIN — HEPARIN SODIUM 5000 UNITS: 5000 INJECTION, SOLUTION INTRAVENOUS; SUBCUTANEOUS at 09:21

## 2017-01-01 RX ADMIN — IPRATROPIUM BROMIDE AND ALBUTEROL SULFATE 3 ML: 2.5; .5 SOLUTION RESPIRATORY (INHALATION) at 01:06

## 2017-01-01 RX ADMIN — SODIUM CHLORIDE 1000 ML: 900 INJECTION, SOLUTION INTRAVENOUS at 17:18

## 2017-01-01 RX ADMIN — SPIRONOLACTONE 25 MG: 25 TABLET, FILM COATED ORAL at 08:31

## 2017-01-01 RX ADMIN — IPRATROPIUM BROMIDE AND ALBUTEROL SULFATE 3 ML: 2.5; .5 SOLUTION RESPIRATORY (INHALATION) at 13:04

## 2017-01-01 RX ADMIN — OXYCODONE HYDROCHLORIDE 10 MG: 5 TABLET ORAL at 15:00

## 2017-01-01 RX ADMIN — FERROUS SULFATE TAB 325 MG (65 MG ELEMENTAL FE) 325 MG: 325 (65 FE) TAB at 09:00

## 2017-01-01 RX ADMIN — CARVEDILOL 3.12 MG: 3.12 TABLET, FILM COATED ORAL at 08:46

## 2017-01-01 RX ADMIN — CARVEDILOL 3.12 MG: 3.12 TABLET, FILM COATED ORAL at 09:21

## 2017-01-01 RX ADMIN — SODIUM CHLORIDE, SODIUM LACTATE, POTASSIUM CHLORIDE, AND CALCIUM CHLORIDE 75 ML/HR: 600; 310; 30; 20 INJECTION, SOLUTION INTRAVENOUS at 05:44

## 2017-01-01 RX ADMIN — Medication 10 ML: at 22:00

## 2017-01-01 RX ADMIN — LINEZOLID 600 MG: 600 TABLET, FILM COATED ORAL at 08:46

## 2017-01-01 RX ADMIN — TRAMADOL HYDROCHLORIDE 50 MG: 50 TABLET, FILM COATED ORAL at 23:41

## 2017-01-01 RX ADMIN — ENOXAPARIN SODIUM 30 MG: 30 INJECTION SUBCUTANEOUS at 22:45

## 2017-01-01 RX ADMIN — ASPIRIN 81 MG: 81 TABLET, COATED ORAL at 09:00

## 2017-01-01 RX ADMIN — DIGOXIN 0.12 MG: 125 TABLET ORAL at 09:37

## 2017-01-01 RX ADMIN — ACETAMINOPHEN 650 MG: 325 TABLET, FILM COATED ORAL at 05:43

## 2017-01-01 RX ADMIN — LISINOPRIL 2.5 MG: 5 TABLET ORAL at 09:37

## 2017-01-01 RX ADMIN — METRONIDAZOLE 500 MG: 500 TABLET ORAL at 20:52

## 2017-01-01 RX ADMIN — ROPIVACAINE HYDROCHLORIDE 30 ML: 5 INJECTION, SOLUTION EPIDURAL; INFILTRATION; PERINEURAL at 12:16

## 2017-01-01 RX ADMIN — PROPOFOL 10 MG: 10 INJECTION, EMULSION INTRAVENOUS at 13:52

## 2017-01-01 RX ADMIN — CEFTRIAXONE 2 G: 2 INJECTION, POWDER, FOR SOLUTION INTRAMUSCULAR; INTRAVENOUS at 12:34

## 2017-01-01 RX ADMIN — SODIUM CHLORIDE 125 ML/HR: 900 INJECTION, SOLUTION INTRAVENOUS at 22:41

## 2017-01-01 RX ADMIN — ONDANSETRON 4 MG: 2 INJECTION INTRAMUSCULAR; INTRAVENOUS at 06:17

## 2017-01-01 RX ADMIN — CALCIUM CARBONATE 500 MG (1,250 MG)-VITAMIN D3 200 UNIT TABLET 1 TABLET: at 09:38

## 2017-01-01 RX ADMIN — AMIODARONE HYDROCHLORIDE 200 MG: 200 TABLET ORAL at 09:37

## 2017-01-01 RX ADMIN — Medication 5 ML: at 06:17

## 2017-01-01 RX ADMIN — HEPARIN SODIUM 5000 UNITS: 5000 INJECTION, SOLUTION INTRAVENOUS; SUBCUTANEOUS at 01:13

## 2017-01-01 RX ADMIN — ENOXAPARIN SODIUM 30 MG: 30 INJECTION SUBCUTANEOUS at 12:13

## 2017-01-01 RX ADMIN — HEPARIN SODIUM 5000 UNITS: 5000 INJECTION, SOLUTION INTRAVENOUS; SUBCUTANEOUS at 17:15

## 2017-01-01 RX ADMIN — IPRATROPIUM BROMIDE AND ALBUTEROL SULFATE 3 ML: .5; 3 SOLUTION RESPIRATORY (INHALATION) at 10:27

## 2017-01-01 RX ADMIN — VITAMIN D, TAB 1000IU (100/BT) 5000 UNITS: 25 TAB at 09:00

## 2017-01-01 RX ADMIN — Medication 10 ML: at 05:43

## 2017-01-01 RX ADMIN — METRONIDAZOLE 500 MG: 500 INJECTION, SOLUTION INTRAVENOUS at 01:38

## 2017-01-01 RX ADMIN — OXYCODONE HYDROCHLORIDE 10 MG: 5 TABLET ORAL at 10:01

## 2017-01-01 RX ADMIN — ALBUMIN (HUMAN) 25 G: 0.25 INJECTION, SOLUTION INTRAVENOUS at 19:23

## 2017-01-01 RX ADMIN — SODIUM CHLORIDE, SODIUM LACTATE, POTASSIUM CHLORIDE, AND CALCIUM CHLORIDE: 600; 310; 30; 20 INJECTION, SOLUTION INTRAVENOUS at 15:34

## 2017-01-01 RX ADMIN — ASPIRIN 325 MG ORAL TABLET 325 MG: 325 PILL ORAL at 17:50

## 2017-01-01 RX ADMIN — CEFTRIAXONE 2 G: 2 INJECTION, POWDER, FOR SOLUTION INTRAMUSCULAR; INTRAVENOUS at 12:28

## 2017-01-01 RX ADMIN — Medication 5 ML: at 14:00

## 2017-01-01 RX ADMIN — IPRATROPIUM BROMIDE AND ALBUTEROL SULFATE 3 ML: 2.5; .5 SOLUTION RESPIRATORY (INHALATION) at 14:40

## 2017-01-01 RX ADMIN — CEFTRIAXONE 2 G: 2 INJECTION, POWDER, FOR SOLUTION INTRAMUSCULAR; INTRAVENOUS at 11:43

## 2017-01-01 RX ADMIN — CLOPIDOGREL BISULFATE 75 MG: 75 TABLET ORAL at 08:31

## 2017-01-01 RX ADMIN — SODIUM CHLORIDE 75 ML/HR: 450 INJECTION, SOLUTION INTRAVENOUS at 05:20

## 2017-01-01 RX ADMIN — HEPARIN SODIUM 5000 UNITS: 5000 INJECTION, SOLUTION INTRAVENOUS; SUBCUTANEOUS at 17:08

## 2017-01-01 RX ADMIN — Medication 10 ML: at 05:56

## 2017-01-01 RX ADMIN — IPRATROPIUM BROMIDE AND ALBUTEROL SULFATE 3 ML: 2.5; .5 SOLUTION RESPIRATORY (INHALATION) at 07:58

## 2017-01-01 RX ADMIN — AMIODARONE HYDROCHLORIDE 100 MG: 200 TABLET ORAL at 06:00

## 2017-01-01 RX ADMIN — IPRATROPIUM BROMIDE AND ALBUTEROL SULFATE 3 ML: 2.5; .5 SOLUTION RESPIRATORY (INHALATION) at 20:38

## 2017-01-01 RX ADMIN — SODIUM CHLORIDE, SODIUM LACTATE, POTASSIUM CHLORIDE, AND CALCIUM CHLORIDE 75 ML/HR: 600; 310; 30; 20 INJECTION, SOLUTION INTRAVENOUS at 20:04

## 2017-01-01 RX ADMIN — CLOPIDOGREL BISULFATE 75 MG: 75 TABLET ORAL at 08:22

## 2017-01-01 RX ADMIN — ONDANSETRON 4 MG: 2 INJECTION INTRAMUSCULAR; INTRAVENOUS at 23:58

## 2017-01-01 RX ADMIN — IPRATROPIUM BROMIDE AND ALBUTEROL SULFATE 3 ML: 2.5; .5 SOLUTION RESPIRATORY (INHALATION) at 02:47

## 2017-01-01 RX ADMIN — SODIUM CHLORIDE 50 ML/HR: 450 INJECTION, SOLUTION INTRAVENOUS at 06:37

## 2017-01-01 RX ADMIN — ONDANSETRON 4 MG: 2 INJECTION INTRAMUSCULAR; INTRAVENOUS at 22:44

## 2017-01-01 RX ADMIN — ONDANSETRON 4 MG: 2 INJECTION INTRAMUSCULAR; INTRAVENOUS at 03:25

## 2017-01-01 RX ADMIN — ALBUMIN (HUMAN) 25 G: 0.25 INJECTION, SOLUTION INTRAVENOUS at 08:04

## 2017-01-01 RX ADMIN — MUPIROCIN: 20 OINTMENT TOPICAL at 10:01

## 2017-01-01 RX ADMIN — CLOPIDOGREL BISULFATE 75 MG: 75 TABLET ORAL at 09:21

## 2017-01-01 RX ADMIN — ACETAMINOPHEN 650 MG: 325 TABLET, FILM COATED ORAL at 23:59

## 2017-01-01 RX ADMIN — TRAZODONE HYDROCHLORIDE 25 MG: 50 TABLET ORAL at 20:06

## 2017-01-01 RX ADMIN — BARIUM SULFATE 30 ML: 980 POWDER, FOR SUSPENSION ORAL at 13:30

## 2017-01-01 RX ADMIN — SODIUM CHLORIDE, SODIUM LACTATE, POTASSIUM CHLORIDE, AND CALCIUM CHLORIDE 75 ML/HR: 600; 310; 30; 20 INJECTION, SOLUTION INTRAVENOUS at 06:05

## 2017-01-01 RX ADMIN — Medication 5 ML: at 05:49

## 2017-01-01 RX ADMIN — CLOPIDOGREL BISULFATE 75 MG: 75 TABLET ORAL at 08:46

## 2017-01-01 RX ADMIN — CALCIUM CARBONATE 500 MG (1,250 MG)-VITAMIN D3 200 UNIT TABLET 1 TABLET: at 14:01

## 2017-01-01 RX ADMIN — Medication 10 ML: at 23:47

## 2017-01-01 RX ADMIN — AMIODARONE HYDROCHLORIDE 100 MG: 200 TABLET ORAL at 09:00

## 2017-01-01 RX ADMIN — CEFAZOLIN SODIUM 1 G: 1 INJECTION, POWDER, FOR SOLUTION INTRAMUSCULAR; INTRAVENOUS at 21:28

## 2017-01-01 RX ADMIN — ACETAMINOPHEN 650 MG: 325 TABLET, FILM COATED ORAL at 14:49

## 2017-01-01 RX ADMIN — IPRATROPIUM BROMIDE AND ALBUTEROL SULFATE 3 ML: 2.5; .5 SOLUTION RESPIRATORY (INHALATION) at 13:59

## 2017-01-01 RX ADMIN — LEVOFLOXACIN 750 MG: 5 INJECTION, SOLUTION INTRAVENOUS at 12:14

## 2017-01-01 RX ADMIN — METRONIDAZOLE 500 MG: 500 INJECTION, SOLUTION INTRAVENOUS at 14:08

## 2017-01-01 RX ADMIN — SODIUM CHLORIDE 75 ML/HR: 450 INJECTION, SOLUTION INTRAVENOUS at 05:46

## 2017-01-01 RX ADMIN — MIDAZOLAM HYDROCHLORIDE 1 MG: 1 INJECTION, SOLUTION INTRAMUSCULAR; INTRAVENOUS at 12:12

## 2017-01-01 RX ADMIN — HEPARIN SODIUM 5000 UNITS: 5000 INJECTION, SOLUTION INTRAVENOUS; SUBCUTANEOUS at 09:00

## 2017-01-01 RX ADMIN — DOCUSATE SODIUM 100 MG: 100 CAPSULE, LIQUID FILLED ORAL at 09:37

## 2017-01-01 RX ADMIN — AMIODARONE HYDROCHLORIDE 200 MG: 200 TABLET ORAL at 08:07

## 2017-01-01 RX ADMIN — SODIUM CHLORIDE 500 ML: 900 INJECTION, SOLUTION INTRAVENOUS at 18:30

## 2017-01-01 RX ADMIN — DOCUSATE SODIUM 100 MG: 100 CAPSULE, LIQUID FILLED ORAL at 20:06

## 2017-01-01 RX ADMIN — VITAMIN D, TAB 1000IU (100/BT) 5000 UNITS: 25 TAB at 09:02

## 2017-01-01 RX ADMIN — GUAIFENESIN AND DEXTROMETHORPHAN 10 ML: 100; 10 SYRUP ORAL at 16:16

## 2017-01-01 RX ADMIN — IPRATROPIUM BROMIDE AND ALBUTEROL SULFATE 3 ML: 2.5; .5 SOLUTION RESPIRATORY (INHALATION) at 20:55

## 2017-01-01 RX ADMIN — HEPARIN SODIUM 5000 UNITS: 5000 INJECTION, SOLUTION INTRAVENOUS; SUBCUTANEOUS at 05:49

## 2017-01-01 RX ADMIN — METRONIDAZOLE 500 MG: 500 INJECTION, SOLUTION INTRAVENOUS at 01:48

## 2017-01-01 RX ADMIN — ACETAMINOPHEN 650 MG: 325 TABLET, FILM COATED ORAL at 14:01

## 2017-01-01 RX ADMIN — Medication 10 ML: at 21:18

## 2017-01-01 RX ADMIN — Medication 10 ML: at 14:49

## 2017-01-01 RX ADMIN — CALCIUM CARBONATE 500 MG (1,250 MG)-VITAMIN D3 200 UNIT TABLET 1 TABLET: at 12:13

## 2017-01-01 RX ADMIN — HEPARIN SODIUM 5000 UNITS: 5000 INJECTION, SOLUTION INTRAVENOUS; SUBCUTANEOUS at 01:36

## 2017-01-01 RX ADMIN — HEPARIN SODIUM 5000 UNITS: 5000 INJECTION, SOLUTION INTRAVENOUS; SUBCUTANEOUS at 13:59

## 2017-01-01 RX ADMIN — CEFTRIAXONE 2 G: 2 INJECTION, POWDER, FOR SOLUTION INTRAMUSCULAR; INTRAVENOUS at 12:05

## 2017-01-01 RX ADMIN — IPRATROPIUM BROMIDE AND ALBUTEROL SULFATE 3 ML: 2.5; .5 SOLUTION RESPIRATORY (INHALATION) at 01:00

## 2017-01-01 RX ADMIN — HEPARIN SODIUM 5000 UNITS: 5000 INJECTION, SOLUTION INTRAVENOUS; SUBCUTANEOUS at 16:13

## 2017-01-01 RX ADMIN — CLOPIDOGREL BISULFATE 75 MG: 75 TABLET ORAL at 08:40

## 2017-01-01 RX ADMIN — IPRATROPIUM BROMIDE AND ALBUTEROL SULFATE 3 ML: 2.5; .5 SOLUTION RESPIRATORY (INHALATION) at 08:45

## 2017-01-01 RX ADMIN — CARVEDILOL 3.12 MG: 3.12 TABLET, FILM COATED ORAL at 08:40

## 2017-01-01 RX ADMIN — CLOPIDOGREL BISULFATE 75 MG: 75 TABLET ORAL at 09:01

## 2017-01-01 RX ADMIN — CALCIUM CARBONATE 500 MG (1,250 MG)-VITAMIN D3 200 UNIT TABLET 1 TABLET: at 08:06

## 2017-01-01 RX ADMIN — Medication 10 ML: at 20:52

## 2017-01-01 RX ADMIN — SODIUM CHLORIDE 1000 ML: 900 INJECTION, SOLUTION INTRAVENOUS at 19:43

## 2017-01-01 RX ADMIN — METRONIDAZOLE 500 MG: 500 TABLET ORAL at 08:46

## 2017-01-01 RX ADMIN — SODIUM CHLORIDE, SODIUM LACTATE, POTASSIUM CHLORIDE, AND CALCIUM CHLORIDE 75 ML/HR: 600; 310; 30; 20 INJECTION, SOLUTION INTRAVENOUS at 05:34

## 2017-01-01 RX ADMIN — CEFAZOLIN SODIUM 1 G: 1 INJECTION, POWDER, FOR SOLUTION INTRAMUSCULAR; INTRAVENOUS at 05:34

## 2017-01-01 RX ADMIN — CLOPIDOGREL BISULFATE 75 MG: 75 TABLET ORAL at 09:00

## 2017-01-01 RX ADMIN — Medication 10 ML: at 21:36

## 2017-01-01 RX ADMIN — SODIUM CHLORIDE 125 ML/HR: 900 INJECTION, SOLUTION INTRAVENOUS at 06:06

## 2017-01-01 RX ADMIN — GUAIFENESIN AND DEXTROMETHORPHAN 10 ML: 100; 10 SYRUP ORAL at 11:00

## 2017-01-01 RX ADMIN — PROPOFOL 25 MCG/KG/MIN: 10 INJECTION, EMULSION INTRAVENOUS at 14:02

## 2017-01-01 RX ADMIN — TUBERCULIN PURIFIED PROTEIN DERIVATIVE 5 UNITS: 5 INJECTION INTRADERMAL at 20:04

## 2017-01-01 RX ADMIN — ONDANSETRON 4 MG: 2 INJECTION INTRAMUSCULAR; INTRAVENOUS at 12:27

## 2017-01-01 RX ADMIN — AMIODARONE HYDROCHLORIDE 100 MG: 200 TABLET ORAL at 10:00

## 2017-01-01 RX ADMIN — OXYCODONE HYDROCHLORIDE 5 MG: 5 TABLET ORAL at 20:05

## 2017-01-01 RX ADMIN — DOCUSATE SODIUM 100 MG: 100 CAPSULE, LIQUID FILLED ORAL at 17:50

## 2017-01-01 RX ADMIN — AMIODARONE HYDROCHLORIDE 100 MG: 200 TABLET ORAL at 08:40

## 2017-01-01 RX ADMIN — CLOPIDOGREL BISULFATE 75 MG: 75 TABLET ORAL at 10:02

## 2017-01-01 RX ADMIN — SODIUM CHLORIDE 75 ML/HR: 900 INJECTION, SOLUTION INTRAVENOUS at 22:45

## 2017-01-01 RX ADMIN — IPRATROPIUM BROMIDE AND ALBUTEROL SULFATE 3 ML: 2.5; .5 SOLUTION RESPIRATORY (INHALATION) at 06:20

## 2017-01-01 RX ADMIN — Medication 10 ML: at 06:04

## 2017-01-01 RX ADMIN — AMIODARONE HYDROCHLORIDE 100 MG: 200 TABLET ORAL at 09:20

## 2017-01-01 RX ADMIN — HEPARIN SODIUM 5000 UNITS: 5000 INJECTION, SOLUTION INTRAVENOUS; SUBCUTANEOUS at 09:19

## 2017-01-01 RX ADMIN — CARVEDILOL 3.12 MG: 3.12 TABLET, FILM COATED ORAL at 09:02

## 2017-01-01 RX ADMIN — METRONIDAZOLE 500 MG: 500 INJECTION, SOLUTION INTRAVENOUS at 13:36

## 2017-01-01 RX ADMIN — Medication 5 ML: at 22:44

## 2017-01-01 RX ADMIN — CALCIUM CARBONATE 500 MG (1,250 MG)-VITAMIN D3 200 UNIT TABLET 1 TABLET: at 17:50

## 2017-01-01 RX ADMIN — IPRATROPIUM BROMIDE AND ALBUTEROL SULFATE 3 ML: 2.5; .5 SOLUTION RESPIRATORY (INHALATION) at 07:35

## 2017-01-12 PROBLEM — S52.279A: Status: ACTIVE | Noted: 2017-01-01

## 2017-01-12 NOTE — PERIOP NOTES
TRANSFER - OUT REPORT:    Verbal report given to [facility] RN(name) on Josephine Katz  being transferred to [facility](unit) for routine post - op       Report consisted of patients Situation, Background, Assessment and   Recommendations(SBAR). Information from the following report(s) SBAR, OR Summary, Procedure Summary, Intake/Output and MAR was reviewed with the receiving nurse. Lines:   Peripheral IV 01/12/17 Right Wrist (Active)   Site Assessment Clean, dry, & intact 1/12/2017  5:38 PM   Phlebitis Assessment 0 1/12/2017  5:38 PM   Infiltration Assessment 0 1/12/2017  5:38 PM   Dressing Status Clean, dry, & intact 1/12/2017  5:38 PM   Dressing Type Transparent 1/12/2017  5:38 PM   Hub Color/Line Status Green; Infusing 1/12/2017  5:38 PM        Opportunity for questions and clarification was provided. Patient transported with:   O2 @ 2 liters    VTE prophylaxis orders have been written for Josephine Katz. Patient given room number and nurses name. Unable to contact family.

## 2017-01-12 NOTE — PERIOP NOTES
Arrived by [facility] transport service ([phone]) from [facility] ([phone]).

## 2017-01-12 NOTE — ANESTHESIA PREPROCEDURE EVALUATION
Anesthetic History               Review of Systems / Medical History  Patient summary reviewed, nursing notes reviewed and pertinent labs reviewed    Pulmonary                   Neuro/Psych              Cardiovascular    Hypertension      CHF: NYHA Classification III    CAD    Exercise tolerance: >4 METS  Comments: Ef 25%--ICD   GI/Hepatic/Renal     GERD: well controlled           Endo/Other             Other Findings            Physical Exam    Airway  Mallampati: II  TM Distance: 4 - 6 cm  Neck ROM: normal range of motion   Mouth opening: Normal     Cardiovascular  Regular rate and rhythm,  S1 and S2 normal,  no murmur, click, rub, or gallop             Dental  No notable dental hx       Pulmonary  Breath sounds clear to auscultation               Abdominal         Other Findings            Anesthetic Plan    ASA: 4  Anesthesia type: spinal and regional - supraclavicular block          Induction: Intravenous  Anesthetic plan and risks discussed with: Patient      Needs ICD turned off with stat pads on

## 2017-01-12 NOTE — PROGRESS NOTES
TRANSFER - IN REPORT:    Verbal report received from [address] [place] [address], RN(name) on Nicky Marrufo  being received from [facility]) for routine post - op      Report consisted of patients Situation, Background, Assessment and   Recommendations(SBAR). Information from the following report(s) SBAR, Kardex, OR Summary, Intake/Output and Recent Results was reviewed with the receiving nurse. Opportunity for questions and clarification was provided.

## 2017-01-12 NOTE — ANESTHESIA PROCEDURE NOTES
Spinal Block    Start time: 1/12/2017 1:53 PM  End time: 1/12/2017 1:58 PM  Performed by: Sheryle Colon  Authorized by: Sheryle Colon     Pre-procedure:   Indications: primary anesthetic  Preanesthetic Checklist: patient identified, risks and benefits discussed, anesthesia consent, site marked, patient being monitored and timeout performed    Timeout Time: 13:53          Spinal Block:   Patient Position:  Right lateral decubitus  Prep Region:  Lumbar  Prep: chlorhexidine      Location:  L3-4  Technique:  Single shot  Local:  Lidocaine 1% (1 mL)  Local Dose (mL):  1    Needle:   Needle Type:  Pencan  Needle Gauge:  25 G  Attempts:  1      Events: CSF confirmed, no blood with aspiration and no paresthesia        Assessment:  Insertion:  Uncomplicated  Patient tolerance:  Patient tolerated the procedure well with no immediate complications  Anesthetic medications:  Marcaine: 12 mg in 8.25% Dextrose

## 2017-01-12 NOTE — PERIOP NOTES
Called [facility], spoke with Candida to verify medication list; also, patient received Hibiclens bath and confirmed NPO status.

## 2017-01-12 NOTE — H&P
Outpatient Surgery History and Physical      Jaron Blazing was seen and examined. Chief Complaint:   LEFT HIP PAIN AND LEFT ARM PAIN. Physical Exam:   There were no vitals taken for this visit. Heart:   Regular rhythm      Lungs:  Are clear      History:  Past Medical History   Diagnosis Date    AICD (automatic cardioverter/defibrillator) present     Anemia 10/25/2012    Aortic valve regurgitation     Arrhythmia      blessing, pacemaker, a fib    CAD (coronary artery disease) 9/4/2012     stent    CHF (congestive heart failure) ([address] 9/4/2012    CHF (congestive heart failure) ([address] 9/4/2012    Chronic kidney disease, stage II (mild)     Chronic systolic heart failure ([address] 4/16/2013    Diverticulitis     Encounter for long-term (current) use of other medications 11/19/2012    Esophagitis 2010     EGD    Falls     Femur fracture, left ([address] 12/02/2016    Fracture of left radius 12/02/2016     left radius and ulna fx    Fracture of wrist 2003     left/pins    HLD (hyperlipidemia) 9/4/2012    HTN (hypertension) 9/4/2012     pt denies    Insomnia     Ischemic cardiomyopathy, chronic     Left bundle branch block (LBBB)     Osteoporosis 9/4/2012    S/P PTCA (percutaneous transluminal coronary angioplasty)     Sigmoid stricture ([address]      Dr. Mehran Almonte    Stiffness in joint      left hip, left wrist    Varicose vein 2011     surg/ Dr. Moreno Hodge      Past Surgical History   Procedure Laterality Date    Hx orthopaedic       lt hand    Hx ptca  2012    Hx cyst removal  2009     right hand    Hx gyn       hysterectomy, bladder sling    Hx implantable cardioverter defibrillator       No family history on file. Social History     Occupational History    Not on file.      Social History Main Topics    Smoking status: Never Smoker    Smokeless tobacco: Never Used    Alcohol use Yes      Comment: occasional    Drug use: No    Sexual activity: Not on file       Allergies: Reviewed per EMR  Allergies   Allergen Reactions    Pcn [Penicillins] Unknown (comments)     May Valerohannah at [facility] no allergies listed       Medications:    Prior to Admission medications    Medication Sig Start Date End Date Taking? Authorizing Provider   carvedilol (COREG) 3.125 mg tablet Take  by mouth two (2) times daily (with meals). Historical Provider   melatonin 1 mg tablet Take 1 mg by mouth nightly. Historical Provider   acetaminophen (TYLENOL EXTRA STRENGTH) 500 mg tablet Take  by mouth every six (6) hours as needed for Pain. Historical Provider   traMADol (ULTRAM) 50 mg tablet Take 50 mg by mouth every six (6) hours as needed for Pain. Historical Provider   traZODone (DESYREL) 50 mg tablet Take  by mouth nightly. 1/2 pill hs    Historical Provider   aspirin (ASPIRIN) 325 mg tablet Take 325 mg by mouth two (2) times a day. Elissa Archuleta MD   furosemide (LASIX) 40 mg tablet Take 40 mg by mouth every morning. Elissa Archuleta MD   HYDROcodone-acetaminophen (NORCO) 7.5-325 mg per tablet Take  by mouth every six (6) hours as needed for Pain. Elissa Archuleta MD   amiodarone (CORDARONE) 200 mg tablet Take 200 mg by mouth every morning. Historical Provider   nitroglycerin (NITROSTAT) 0.4 mg SL tablet 1 Tab by SubLINGual route every five (5) minutes as needed for Chest Pain. 4/22/16   Neil Berger MD   digoxin (LANOXIN) 0.125 mg tablet Take  by mouth every morning. Historical Provider   lisinopril (PRINIVIL, ZESTRIL) 2.5 mg tablet Take 1 Tab by mouth daily. Patient taking differently: Take 2.5 mg by mouth every morning. 12/5/13   Yaneth Cifuentes MD        The surgery is planned for the HARDWARE REMOVAL LEFT HIP WITH CONVERSION TO LEFT HIP HEMIARTHROPLASTY/ OPEN REDUCTION INTERNAL FIXATION LEFT MONTEGGIA FRACTURE. The patient is here today for outpatient surgery. I have examined the patient, no changes are noted in the patient's medical status.  Necessity for the procedure/care is still present and the history and physical above is current.       Signed By: Rosey Bryant NP     January 12, 2017 8:14 AM

## 2017-01-12 NOTE — ANESTHESIA PROCEDURE NOTES
Peripheral Block    Start time: 1/12/2017 12:11 PM  End time: 1/12/2017 12:16 PM  Performed by: Ismael Pickett  Authorized by: Ismael Pickett       Pre-procedure:    Indications: at surgeon's request and post-op pain management    Preanesthetic Checklist: patient identified, risks and benefits discussed, site marked, timeout performed, anesthesia consent given and patient being monitored    Timeout Time: 12:11          Block Type:   Block Type:  Supraclavicular  Laterality:  Left  Monitoring:  Standard ASA monitoring, continuous pulse ox, frequent vital sign checks, heart rate, oxygen and responsive to questions  Injection Technique:  Single shot  Procedures: ultrasound guided    Patient Position: supine  Prep: chlorhexidine    Needle Type:  Stimuplex  Needle Gauge:  21 G  Needle Localization:  Ultrasound guidance and anatomical landmarks  Medication Injected:  0.5%  ropivacaine  Volume (mL):  30    Assessment:  Number of attempts:  1  Injection Assessment:  Incremental injection every 5 mL, local visualized surrounding nerve on ultrasound, negative aspiration for blood, no paresthesia, no intravascular symptoms and ultrasound image on chart  Patient tolerance:  Patient tolerated the procedure well with no immediate complications

## 2017-01-12 NOTE — PROGRESS NOTES
Defibrillator disabled per rep [place] Side from [facility]. Placed on manual defibrillator. Monitoring continuously.

## 2017-01-13 NOTE — PROGRESS NOTES
Medication side effects fact sheet provided and reviewed with patient/family. Fracture book and welcome packet provided.

## 2017-01-13 NOTE — CONSULTS
HOSPITALIST H&P/CONSULT  NAME:  Rocío Nick   Age:  80 y.o.  :   7/15/1926   MRN:   [id number]  PCP: Jabari Nichols MD  Consulting MD:  Treatment Team: Attending Provider: Michael Ley MD; Charge Nurse: Karis Stone RN; Utilization Review: Mari Bowling; Consulting Provider: Renny Vicente MD  HPI:     Derrel Lesches is a 81 YO female patient with an extensive PMH of systolic CHF with EF 15 - 20  % s/p ICD placement, aortic regurgitation, ischemic cardiopathy who underwent a left proximal ulna ORIF and hardware removal of left hip with conversion to hip hemiarthroplasty. Since yesterday her O2 sat has been low and has required 3 Lts of O2 by nasal canula. Today she started having low blood pressure ( mainly diastolic BP) and for that reason the hospitalist group was consulted. 10 point ROS done and is negative except as noted in HPI.   Past Medical History   Diagnosis Date    AICD (automatic cardioverter/defibrillator) present     Anemia 10/25/2012    Aortic valve regurgitation     Arrhythmia      blessing, pacemaker, a fib    CAD (coronary artery disease) 2012     stent    CHF (congestive heart failure) ([address] 2012    CHF (congestive heart failure) ([address] 2012    Chronic kidney disease, stage II (mild)     Chronic systolic heart failure ([address] 2013    Diverticulitis     Encounter for long-term (current) use of other medications 2012    Esophagitis 2010     EGD    Falls     Femur fracture, left ([address] 2016    Fracture of left radius 2016     left radius and ulna fx    Fracture of wrist      left/pins    HLD (hyperlipidemia) 2012    HTN (hypertension) 2012     pt denies    Insomnia     Ischemic cardiomyopathy, chronic     Left bundle branch block (LBBB)     Osteoporosis 2012    S/P PTCA (percutaneous transluminal coronary angioplasty)     Sigmoid stricture ([address]      Dr. Sun Robertson    Stiffness in joint      left hip, left wrist  Varicose vein      surg/ Dr. Moreno Hodge      Past Surgical History   Procedure Laterality Date    Hx orthopaedic       lt hand    Hx ptca  2012    Hx cyst removal  2009     right hand    Hx gyn       hysterectomy, bladder sling    Hx implantable cardioverter defibrillator        Prior to Admission Medications   Prescriptions Last Dose Informant Patient Reported? Taking? HYDROcodone-acetaminophen (NORCO) 7.5-325 mg per tablet Unknown at Unknown time  Yes No   Sig: Take  by mouth every six (6) hours as needed for Pain. acetaminophen (TYLENOL EXTRA STRENGTH) 500 mg tablet Unknown at Unknown time  Yes No   Sig: Take  by mouth every six (6) hours as needed for Pain. amiodarone (CORDARONE) 200 mg tablet 2017 at Unknown time  Yes Yes   Sig: Take 200 mg by mouth every morning. aspirin (ASPIRIN) 325 mg tablet 2017 at 1700  Yes Yes   Sig: Take 325 mg by mouth two (2) times a day. carvedilol (COREG) 3.125 mg tablet 2017 at 0900  Yes Yes   Sig: Take  by mouth two (2) times daily (with meals). digoxin (LANOXIN) 0.125 mg tablet 2017 at Unknown time  Yes Yes   Sig: Take  by mouth every morning. furosemide (LASIX) 40 mg tablet 2017 at Unknown time  Yes Yes   Sig: Take 40 mg by mouth every morning. lisinopril (PRINIVIL, ZESTRIL) 2.5 mg tablet 2017 at Unknown time  No Yes   Sig: Take 1 Tab by mouth daily. Patient taking differently: Take 2.5 mg by mouth every morning. melatonin 1 mg tablet   Yes No   Sig: Take 1 mg by mouth nightly. nitroglycerin (NITROSTAT) 0.4 mg SL tablet   No No   Si Tab by SubLINGual route every five (5) minutes as needed for Chest Pain.   traMADol (ULTRAM) 50 mg tablet 2017 at Unknown time  Yes Yes   Sig: Take 50 mg by mouth every six (6) hours as needed for Pain. traZODone (DESYREL) 50 mg tablet Unknown at Unknown time  Yes No   Sig: Take  by mouth nightly.  1/2 pill hs      Facility-Administered Medications: None     Home meds reconciled. Allergies   Allergen Reactions    Pcn [Penicillins] Unknown (comments)     Theodora Hall at [facility] no allergies listed      Social History   Substance Use Topics    Smoking status: Never Smoker    Smokeless tobacco: Never Used    Alcohol use Yes      Comment: occasional      No family history on file. Immunization History   Administered Date(s) Administered    Pneumococcal Conjugate (PCV-13) 2015    Pneumococcal Vaccine (Pcv) 2008    TB Skin Test (PPD) Intradermal 2017     Objective:     Visit Vitals    BP (!) 104/39 (BP 1 Location: Right arm, BP Patient Position: Sitting)    Pulse 81    Temp 98.2 °F (36.8 °C)    Resp 17    Ht 5' 2\" (1.575 m)    Wt 55.8 kg (123 lb)    SpO2 90%    BMI 22.5 kg/m2      Temp (24hrs), Av.9 °F (36.6 °C), Min:97.4 °F (36.3 °C), Max:98.2 °F (36.8 °C)    Oxygen Therapy  O2 Sat (%): 90 % (17 1428)  Pulse via Oximetry: 70 beats per minute (17 1803)  O2 Device: Nasal cannula (17 173)  O2 Flow Rate (L/min): 3 l/min (17 173)  Physical Exam:  General:    Alert, cooperative, no distress   Head:   NCAT. No obvious deformity  Nose:  Nares normal. No drainage  Lungs:   CTABL. No wheezing/rhonchi/rales  Heart:   RRR. No m/r/g. Abdomen:   S/nt/nd. Bowel sounds normal.   Extremities: S/p ORIF of left UE and left hip   Skin:     No rashes or lesions. Not Jaundiced  Neurologic: Moves all extremities. no gross focal deficits      Data Review:   Recent Results (from the past 24 hour(s))   MRSA SCREEN - PCR (NASAL)    Collection Time: 17 11:58 PM   Result Value Ref Range    Special Requests: NO SPECIAL REQUESTS      Culture result:        MRSA target DNA is detected (presumptive positive for MRSA colonization).     Culture result:        RESULTS VERIFIED, PHONED TO AND READ BACK BY  Osman Ledezma RN AT [phone] ON [id number] BY MPDIANA     CBC WITH AUTOMATED DIFF    Collection Time: 17  6:31 AM   Result Value Ref Range    WBC 10.2 4.3 - 11.1 K/uL    RBC 3.31 (L) 4.05 - 5.25 M/uL    HGB 10.5 (L) 11.7 - 15.4 g/dL    HCT 32.7 (L) 35.8 - 46.3 %    MCV 98.8 (H) 79.6 - 97.8 FL    MCH 31.7 26.1 - 32.9 PG    MCHC 32.1 31.4 - 35.0 g/dL    RDW 15.0 (H) 11.9 - 14.6 %    PLATELET 728 606 - 616 K/uL    MPV 11.8 10.8 - 14.1 FL    DF AUTOMATED      NEUTROPHILS 90 (H) 43 - 78 %    LYMPHOCYTES 6 (L) 13 - 44 %    MONOCYTES 3 (L) 4.0 - 12.0 %    EOSINOPHILS 1 0.5 - 7.8 %    BASOPHILS 0 0.0 - 2.0 %    IMMATURE GRANULOCYTES 0.2 0.0 - 5.0 %    ABS. NEUTROPHILS 9.1 (H) 1.7 - 8.2 K/UL    ABS. LYMPHOCYTES 0.6 0.5 - 4.6 K/UL    ABS. MONOCYTES 0.3 0.1 - 1.3 K/UL    ABS. EOSINOPHILS 0.1 0.0 - 0.8 K/UL    ABS. BASOPHILS 0.0 0.0 - 0.2 K/UL    ABS. IMM. GRANS. 0.0 0.0 - 0.5 K/UL     Imaging /Procedures /Studies:  I personally reviewed all labs, imaging, and other studies this admission:  CXR Results  (Last 48 hours)    None        CT Results  (Last 48 hours)    None          Assessment and Plan: Active Hospital Problems    Diagnosis Date Noted    Monteggia fracture, closed 01/12/2017       PLAN    # Hypotension   -Patient received her regular medications for CHF this morning including coreg, lisinopril and lasix.   -She underwent an extensive orthopedic surgery yesterday, and is receiving oral narcotics for pain, most likely for that reason her regular CHF meds have been not very well tolerated and have caused hypotension   -she is on IV fluids at 75 cc/h, her lungs DO NOT sound congested, will continue IV fluids at this rate for now   -Will order IV albumin q12h, first dose STAT now   -if chest x ray is not showing fluid overload will order a small bolus of NS   -will hold ALL her BP meds for now   -will monitor her kidney function   -I would expect a quick recovery of her BP which most likely will remain on the low side.  CHF meds to be adjusted in the am.   -Her MAP has been around 58-61 and patient remains ASYMPTOMATIC     # HYPOXIC respiratory failure -responding well to supplementary O2   -will order chest x ray. Clinically she DOES NOT look in distress   -most likely due to atelectasis     # chronic systolic CHF NOT in exacerbation   -LVEF 15-20%   -stable   -will monitor to avoid fluid overload. DVT ppx: as per ortho protocol     Plan of care discussed with: patient     Thank you for allowing us to participate in the care of this patient. Will follow this case with you.      Signed By: Corinne Riggs MD     January 13, 2017

## 2017-01-13 NOTE — ADDENDUM NOTE
Addendum  created 01/13/17 1314 by Maria Eugenia Contreras CRNA    Anesthesia Intra Flowsheets edited

## 2017-01-13 NOTE — BRIEF OP NOTE
BRIEF OPERATIVE NOTE    Date of Procedure: 1/12/2017   Preoperative Diagnosis: Closed Monteggia's fracture of left ulna, initial encounter [S52.149A]  Mechanical complic of internal orthopedic device, implant or graft, initial encounter ([facility] [T82.029W]  Postoperative Diagnosis: LEFT FEMORAL NECK FRACTURE/LEFT PROXIMAL ULNA [address]    Procedure(s):  LEFT PROXIMAL ULNA OPEN REDUCTION INTERNAL FIXATION/LEFT RADIAL HEAD DISLOCATION  HARDWARE REMOVAL LEFT HIP WITH CONVERSION TO HIP HEMIARTHROPLASTY  Surgeon(s) and Role:     * Shakira Hoover MD - Primary            Surgical Staff:  Circ-1: Mary Beth Stringer RN  Circ-Relief: Edilberto Singh RN  Scrub Tech-1: Shakira Villaseñor  Scrub Tech-2: Kathe Villareal  Scrub Tech-3: Darci Pisano  Event Time In   Incision Start 1419   Incision Close 1700     Anesthesia: General   Estimated Blood Loss: 200 CC FOR HIP; TOURNIQUET FOR ELBOW  Specimens: * No specimens in log *   Findings: NONE   Complications: NONE  Implants:   Implant Name Type Inv.  Item Serial No.  Lot No. LRB No. Used Action   STEM FEM CORAIL STD COL SZ 13 -- - [id number]  STEM FEM CORAIL STD COL SZ 13 --  [facility] [employer] [id number] Left 1 Implanted   HEAD FEM 28MM +12MM NK -- - [id number]  HEAD FEM 28MM +12MM NK --  [facility] [employer] [id number] Left 1 Implanted   HEAD FEM SLF-CENTER 48X28 BRN -- - [id number]  HEAD FEM SLF-CENTER 48X28 BRN --  [facility] [employer] [id number] Left 1 Implanted   BONE GRFT SUB DBX PUTTY 2.5ML --  - [id number]  BONE GRFT SUB DBX PUTTY 2.5ML --  [id number] MUSCULOSKELETAL TRANS  Left 1 Implanted   PLATE PROX ULNA 8H LT 2.7X3.5 -- VA-LCP X-ARTICLR - [id number]  PLATE PROX ULNA 8H LT 2.7X3.5 -- VA-LCP X-ARTICLR  [employer] [place] [id number] Left 1 Implanted   SCR VA LCK STRDRV T8 2.7X16MM -- - [id number]  SCR VA LCK STRDRV T8 2.7X16MM --  [employer] [place] [id number] Left 2 Implanted   SCR VA LCK STRDRV T8 2.7X20MM -- - [id number] SCR VA LCK STRDRV T8 2.7X20MM --  [employer] [place] [id number] Left 1 Implanted   SCR VA LCK STRDRV T8 2.7X22MM -- - [id number]  SCR VA LCK STRDRV T8 2.7X22MM --  [employer] [place] [id number] Left 1 Implanted   SCR VA LCK STRDRV T8 2.7X30MM -- - [id number]  SCR VA LCK STRDRV T8 2.7X30MM --  [employer] [place] [id number] Left 1 Implanted   SCR BNE LCK ST T25 3.5X16MM SS -- - [id number]  SCR BNE LCK ST T25 3.5X16MM SS --  [employer] [place] [id number] Left 3 Implanted   SCR BNE LCK ST T25 3.5X20MM SS -- - [id number]  SCR BNE LCK ST T25 3.5X20MM SS --  [employer] [place] [id number] Left 1 Implanted   SCR VA LCK STRDRV T8 2.7X38MM -- - [id number]   SCR VA LCK STRDRV T8 2.7X38MM --   [facility] Left 1 Implanted

## 2017-01-13 NOTE — PROGRESS NOTES
Problem: Mobility Impaired (Adult and Pediatric)  Goal: *Acute Goals and Plan of Care (Insert Text)  STG:  (1.)Ms. Vadim Kidd will perform bed mobility with MODERATE ASSIST x1 within 3 day(s). (2.)Ms. Vadim Kidd will demonstrate good dynamic sitting balance within 3 day(s). (3.)Ms. Vadim Kidd will transfer from bed to chair and chair to bed with MODERATE ASSIST x1 using the least restrictive device within 3 day(s). (4.)Ms. Vadim Kidd will tolerate 15+ minutes of therapeutic activity/exercise while maintaining stable vitals within 3 day(s). LTG:  (1.)Ms. Vadim Kidd will perform bed mobility with MINIMAL ASSISTANCE within 7 day(s). (2.)Ms. Vadim Kidd will transfer from bed to chair and chair to bed with MINIMAL ASSISTANCE x1 using the least restrictive device within 7 day(s). (3.)Ms. Vadim Kidd will ambulate 10+ft with MINIMAL ASSISTANCE x1 utilizing least restrictive assistive device within 7 day(s). (4.)Ms. Vadim Kidd will tolerate 25+ minutes of therapeutic activity/exercise while maintaining stable vitals within 7 day(s).    ________________________________________________________________________________________________      PHYSICAL THERAPY: Initial Assessment, Treatment Day: Day of Assessment and PM    1/13/2017  INPATIENT: Hospital Day: 2  [place] / [place] / Product Type: Medicare /       KM L CARLIN; Luis Miguel NYE     NAME/AGE/GENDER: Katy Mcintyre is a 80 y.o. female           PRIMARY DIAGNOSIS: Closed Monteggia's fracture of left ulna, initial encounter [A50.535A]  Mechanical complic of internal orthopedic device, implant or graft, [facility] [W91.454O] <principal problem not specified> <principal problem not specified>  Procedure(s) (LRB):  LEFT PROXIMAL ULNA OPEN REDUCTION INTERNAL FIXATION/LEFT RADIAL HEAD DISLOCATION (Left)  HARDWARE REMOVAL LEFT HIP WITH CONVERSION TO HIP HEMIARTHROPLASTY (Left)  1 Day Post-Op  ICD-10: Treatment Diagnosis:       · Generalized Muscle Weakness (M62.81)  · Difficulty in walking, Not elsewhere classified (R26.2)   Precaution/Allergies:  Pcn [penicillins]       ASSESSMENT:      Ms. Lowell Mendosa presents is a 80year old female 1 day s/p left proximal ulnar ORIF and left hip hemiarthroplasty. Patient is NWB on L UE and WBAT L LE with hip precautions. Patient seen this PM for  physical therapy. Presents sitting in chair from AM treatment. Appears mildly confused but follows commands well with mild impulsivity. Patient has been in [facility] since previous hip surgery and primary utilizing a WC and progressing with transfers; prior to initial hip injury in November 2016, patient was independent with ADLs and ambulation. Today, sling on L UE and knee immobilizer removed for mobility. Patient requested to use the BR. Attempted sit to stand to transfer to the right with max assist x 2 and VC's for pt to push up from arm of chair. Each time right before she stood she would let go of chair arm and grab BSC arm and she would have to sit back down. Eventually she was transferred to the left back to bed with maximal assistance x2 and gait belt. Patient able to bear weight through B LEs to assist with transfer. Ms. Lowell Mendosa presents with decreased functional mobility and balance/gait status from baseline. Recommend continued skilled PT services to address stated deficits. Will follow and progress toward stated goals during acute stay. Plan to progress toward hemiwalker for gait training as patient is able. This section established at most recent assessment   PROBLEM LIST (Impairments causing functional limitations):  1. Decreased Strength  2. Decreased ADL/Functional Activities  3. Decreased Transfer Abilities  4. Decreased Ambulation Ability/Technique  5. Decreased Balance  6. Increased Pain  7. Decreased Activity Tolerance  8. Decreased Flexibility/Joint Mobility  9. Decreased Knowledge of Precautions  10.  Decreased [place] with Home Exercise Program  11. Decreased Cognition    INTERVENTIONS PLANNED: (Benefits and precautions of physical therapy have been discussed with the patient.)  1. Balance Exercise  2. Bed Mobility  3. Family Education  4. Gait Training  5. Home Exercise Program (HEP)  6. Manual Therapy  7. Range of Motion (ROM)  8. Therapeutic Activites  9. Therapeutic Exercise/Strengthening  10. Transfer Training  11. Group Therapy      TREATMENT PLAN: Frequency/Duration: twice daily for duration of hospital stay  Rehabilitation Potential For Stated Goals: GOOD      RECOMMENDED REHABILITATION/EQUIPMENT: (at time of discharge pending progress): Continue Skilled Therapy. HISTORY:   History of Present Injury/Illness (Reason for Referral):  Difficulty in Walking; S/p above procedures  Past Medical History/Comorbidities:   Ms. Jonathon Roque  has a past medical history of AICD (automatic cardioverter/defibrillator) present; Anemia (10/25/2012); Aortic valve regurgitation; Arrhythmia; CAD (coronary artery disease) (9/4/2012); CHF (congestive heart failure) ([address] (9/4/2012); CHF (congestive heart failure) ([address] (9/4/2012); Chronic kidney disease, stage II (mild); Chronic systolic heart failure ([address] (4/16/2013); Diverticulitis; Encounter for long-term (current) use of other medications (11/19/2012); Esophagitis (2010); Falls; Femur fracture, left ([address] (12/02/2016); Fracture of left radius (12/02/2016); Fracture of wrist (2003); HLD (hyperlipidemia) (9/4/2012); HTN (hypertension) (9/4/2012); Insomnia; Ischemic cardiomyopathy, chronic; Left bundle branch block (LBBB); Osteoporosis (9/4/2012); S/P PTCA (percutaneous transluminal coronary angioplasty); Sigmoid stricture ([address] Stiffness in joint; and Varicose vein (2011). Ms. Jonathon Roque  has a past surgical history that includes orthopaedic; ptca (2012); cyst removal (2009); gyn; and implantable cardioverter defibrillator.   Social History/Living Environment:   Home Environment: Skilled nursing facility ([facility])  One/Two Story Residence: One story  Living Alone: No  Support Systems: Child(nkechi)  Patient Expects to be Discharged to[de-identified] Skilled nursing facility  Current DME Used/Available at Home: Shower chair, Grab bars  Prior Level of Function/Work/Activity:  [facility] prior to this admission. S/p previous hip surgery. Primarily utilizing a WC for mobility and progressing toward transfers. Prior to 2016, patient was independent with functional mobility and ADLs. Number of Personal Factors/Comorbidities that affect the Plan of Care: 3+: HIGH COMPLEXITY   EXAMINATION:   Most Recent Physical Functioning:   Gross Assessment:                  Posture:  Posture (WDL): Exceptions to WDL  Posture Assessment: Forward head  Balance:  Sitting: Impaired  Standing: Impaired Bed Mobility:  Sit to Supine: Maximum assistance;Assist x2  Wheelchair Mobility:     Transfers:  Sit to Stand: Maximum assistance;Assist x2  Bed to Chair: Maximum assistance;Assist x2  Gait:             Body Structures Involved:  1. Bones  2. Joints  3. Muscles  4. Ligaments Body Functions Affected:  1. Neuromusculoskeletal  2. Movement Related Activities and Participation Affected:  1. General Tasks and Demands  2. Mobility  3. Self Care  4. Domestic Life   Number of elements that affect the Plan of Care: 4+: HIGH COMPLEXITY   CLINICAL PRESENTATION:   Presentation: Evolving clinical presentation with changing clinical characteristics: MODERATE COMPLEXITY   CLINICAL DECISION MAKIN [facility] Mobility Inpatient Short Form  How much difficulty does the patient currently have. .. Unable A Lot A Little None   1. Turning over in bed (including adjusting bedclothes, sheets and blankets)? [ ] 1   [X] 2   [ ] 3   [ ] 4   2. Sitting down on and standing up from a chair with arms ( e.g., wheelchair, bedside commode, etc.)   [ ] 1   [X] 2   [ ] 3   [ ] 4   3. Moving from lying on back to sitting on the side of the bed? [ ] 1   [X] 2   [ ] 3   [ ] 4   How much help from another person does the patient currently need. .. Total A Lot A Little None   4. Moving to and from a bed to a chair (including a wheelchair)? [ ] 1   [X] 2   [ ] 3   [ ] 4   5. Need to walk in hospital room? [ ] 1   [X] 2   [ ] 3   [ ] 4   6. Climbing 3-5 steps with a railing? [ ] 1   [X] 2   [ ] 3   [ ] 4   © 2007, Trustees of [address] [place] [address] [address] [id number], under license to [employer]. All rights reserved    Score:  Initial: 12 Most Recent: 12 (Date: 1/13/17 )     Interpretation of Tool:  Represents activities that are increasingly more difficult (i.e. Bed mobility, Transfers, Gait). Score 24 23 22-20 19-15 14-10 9-7 6       Modifier CH CI CJ CK CL CM CN         · Mobility - Walking and Moving Around:               - CURRENT STATUS:    CL - 60%-79% impaired, limited or restricted               - GOAL STATUS:           CJ - 20%-39% impaired, limited or restricted               - D/C STATUS:                       ---------------To be determined---------------  [place] / [place] / Product Type: Medicare /       Medical Necessity:     · Patient demonstrates good rehab potential due to higher previous functional level. Reason for Services/Other Comments:  · Patient continues to require skilled intervention due to decreased functional mobility and balance/gait status from baseline. .   Use of outcome tool(s) and clinical judgement create a POC that gives a: Questionable prediction of patient's progress: MODERATE COMPLEXITY                 TREATMENT:   (In addition to Assessment/Re-Assessment sessions the following treatments were rendered)   Pre-treatment Symptoms/Complaints:    Pain: Initial:      Post Session:  Unable to rate but grimacing and moaning with movement. Therapeutic Activity: (    17 Minutes):   Therapeutic activities including bed mobility, transfer training, balance training, safety awareness training, and patient education to improve mobility, strength, balance and coordination. Required minimal   to promote static and dynamic balance in standing and promote coordination of left, upper extremity(s), lower extremity(s). Treatment/Session Assessment:    · Interdisciplinary Collaboration:  · Physical Therapy Assistant, Registered Nurse, Rehabilitation Attendant and Certified Nursing Assistant/Patient Care Technician  · After treatment position/precautions:  · Supine in bed, Bed alarm/tab alert on, Bed/Chair-wheels locked, Bed in low position, Call light within reach, RN notified and Family at bedside  · Compliance with Program/Exercises: Will assess as treatment progresses. · Recommendations/Intent for next treatment session: \"Next visit will focus on advancements to more challenging activities and reduction in assistance provided\".   ·   Total Treatment Duration:  PT Patient Time In/Time Out  Time In: 1415  Time Out: Adonay Perez, PTA

## 2017-01-13 NOTE — PROGRESS NOTES
ORTH FRACTURE PROGRESS NOTE    2017  Admit Date:   2017    Post Op day: 1 Day Post-Op    Subjective:    Nicky Marrufo PATIENT SITTING UP IN RECLINER; SAYS SHE IS SORE     PT/OT:   Gait:                    Vital Signs:    Patient Vitals for the past 8 hrs:   BP Temp Pulse Resp SpO2   17 1118 101/40 98.2 °F (36.8 °C) 86 18 91 %   17 0724 (!) 105/35 - - - -   17 0717 (!) 107/39 98.1 °F (36.7 °C) 69 18 91 %     Temp (24hrs), Av.8 °F (36.6 °C), Min:97.4 °F (36.3 °C), Max:98.2 °F (36.8 °C)      Pain Control:   Pain Assessment  Pain Scale 1: Visual  Pain Intensity 1: 10  Pain Onset 1: post op  Pain Location 1: Arm, Leg  Pain Orientation 1: Left  Pain Description 1: Aching  Pain Intervention(s) 1: Medication (see MAR)    Meds:    Current Facility-Administered Medications   Medication Dose Route Frequency    mupirocin (BACTROBAN) 2 % ointment   Topical BID    lactated ringers infusion  75 mL/hr IntraVENous ON CALL TO OR    amiodarone (CORDARONE) tablet 200 mg  200 mg Oral DAILY    aspirin (ASPIRIN) tablet 325 mg  325 mg Oral BID    carvedilol (COREG) tablet 3.125 mg  3.125 mg Oral BID WITH MEALS    digoxin (LANOXIN) tablet 0.125 mg  0.125 mg Oral DAILY    furosemide (LASIX) tablet 40 mg  40 mg Oral DAILY    lisinopril (PRINIVIL, ZESTRIL) tablet 2.5 mg  2.5 mg Oral DAILY    nitroglycerin (NITROSTAT) tablet 0.4 mg  0.4 mg SubLINGual Q5MIN PRN    traMADol (ULTRAM) tablet 50 mg  50 mg Oral Q6H PRN    traZODone (DESYREL) tablet 25 mg  25 mg Oral QHS    lactated ringers infusion  75 mL/hr IntraVENous CONTINUOUS    sodium chloride (NS) flush 5-10 mL  5-10 mL IntraVENous Q8H    sodium chloride (NS) flush 5-10 mL  5-10 mL IntraVENous PRN    acetaminophen (TYLENOL) tablet 650 mg  650 mg Oral Q8H    oxyCODONE IR (ROXICODONE) tablet 5 mg  5 mg Oral Q4H PRN    ondansetron (ZOFRAN) injection 4 mg  4 mg IntraVENous Q4H PRN    docusate sodium (COLACE) capsule 100 mg  100 mg Oral BID    alum-mag hydroxide-simeth (MYLANTA) oral suspension 30 mL  30 mL Oral Q4H PRN    calcium-vitamin D (OS-PARI) 500 mg-200 unit tablet  1 Tab Oral TID WITH MEALS    enoxaparin (LOVENOX) injection 30 mg  30 mg SubCUTAneous Q24H    influenza vaccine 2016-17 (36mos+)(PF) (FLUZONE/FLUARIX/FLULAVAL QUAD) injection 0.5 mL  0.5 mL IntraMUSCular PRIOR TO DISCHARGE    oxyCODONE IR (ROXICODONE) tablet 10 mg  10 mg Oral Q4H PRN       LAB:    Recent Labs      01/13/17   0631   HCT  32.7*   HGB  10.5*       24 Hour Assessment Issues:    Oriented    Discharge Planning: SNF    Transfuse PRBC's:      Assessment & Physician's Comment:  Dressing is clean, dry, and intact  Neurovascular checks within normal limits    Active Problems:    Monteggia fracture, closed (1/12/2017)        Plan:  CONTINUE THERAPY   WBAT LEFT LE   NWB LEFT UE   DC TO SNF IN AM FOR REHAB   FOLLOW-UP ON TUESDAY IN OFFICE      Alex Sarmiento NP

## 2017-01-13 NOTE — DISCHARGE SUMMARY
Physician Discharge Summary       Patient: Lexy Almeida MRN: [id number]  SSN: [id number]    YOB: 1926  Age: 80 y.o. Sex: female    PCP: Geraldo Mendez MD    Admit date: 1/12/2017  Admitting Provider: Peri Garcia MD    Discharge date: 1/15/2017  Discharging Provider: Patricia Guy NP    * Admission Diagnoses: Closed Monteggia's fracture of left ulna, initial encounter [S52.272A]  Mechanical complic of internal orthopedic device, implant or graft, initial encounter [facility]) [X25.825G]    * Discharge Diagnoses:    Hospital Problems as of 1/13/2017  Date Reviewed: 10/28/2016          Codes Class Noted - Resolved POA    Monteggia fracture, closed ICD-10-CM: S52.279A  ICD-9-CM: 813.03  1/12/2017 - Present Unknown              * Hospital Course: MS BATRES WAS ADMITTED FOR LEFT MONTEGGIA FRACTURE AND HARDWARE FAILURE LEFT HIP. SHE UNDERWENT HARDWARE REMOVAL LEFT HIP WITH CONVERSION TO LEFT HIP HEMIARTHROPLASTY; PT/OT WAS CONSULTED; SHE HAS DONE WELL AND IS STABLE BACK TO HER SNF FOR CONTINUED REHAB.      * Procedures:   Procedure(s):  LEFT PROXIMAL ULNA OPEN REDUCTION INTERNAL FIXATION/LEFT RADIAL HEAD DISLOCATION  HARDWARE REMOVAL LEFT HIP WITH CONVERSION TO HIP HEMIARTHROPLASTY      Consults: None    Significant Diagnostic Studies: labs: CBC    Discharge Exam:  Visit Vitals    /40 (BP 1 Location: Right arm, BP Patient Position: At rest)    Pulse 86    Temp 98.2 °F (36.8 °C)    Resp 18    Ht 5' 2\" (1.575 m)    Wt 55.8 kg (123 lb)    SpO2 91%    BMI 22.5 kg/m2     General appearance: alert, cooperative, no distress, appears stated age  Lungs: clear to auscultation bilaterally  Heart: regular rate and rhythm, S1, S2 normal, no murmur, click, rub or gallop  Extremities: decreased ROM to left le and left ue; staples intact to left hip; ki on left leg; left ue in splint and sling; n/v intact   Pulses: 2+ and symmetric  Skin: Skin color, texture, turgor normal. No rashes or lesions  Neurologic: Alert and oriented X 3, normal strength and tone. Normal symmetric reflexes. Normal coordination and gait    * Discharge Condition: good  * Disposition: [place] ([facility])    Discharge Medications:  Current Discharge Medication List      START taking these medications    Details   calcium-vitamin D (OYSTER SHELL) 500 mg(1,250mg) -200 unit per tablet Take 1 Tab by mouth three (3) times daily (with meals). Qty: 90 Tab, Refills: 0      docusate sodium (COLACE) 100 mg capsule Take 1 Cap by mouth two (2) times a day for 90 days. Qty: 60 Cap, Refills: 2      enoxaparin (LOVENOX) 30 mg/0.3 mL injection 0.3 mL by SubCUTAneous route every twenty-four (24) hours for 28 days. Qty: 28 Syringe, Refills: 0      oxyCODONE IR (ROXICODONE) 10 mg tab immediate release tablet Take 0.5 Tabs by mouth every four (4) hours as needed. Max Daily Amount: 30 mg.  Qty: 40 Tab, Refills: 0         CONTINUE these medications which have CHANGED    Details   acetaminophen (TYLENOL) 325 mg tablet Take 2 Tabs by mouth every eight (8) hours. Qty: 90 Tab, Refills: 0         CONTINUE these medications which have NOT CHANGED    Details   carvedilol (COREG) 3.125 mg tablet Take  by mouth two (2) times daily (with meals). traMADol (ULTRAM) 50 mg tablet Take 50 mg by mouth every six (6) hours as needed for Pain. aspirin (ASPIRIN) 325 mg tablet Take 325 mg by mouth two (2) times a day. furosemide (LASIX) 40 mg tablet Take 40 mg by mouth every morning. amiodarone (CORDARONE) 200 mg tablet Take 200 mg by mouth every morning. digoxin (LANOXIN) 0.125 mg tablet Take  by mouth every morning. lisinopril (PRINIVIL, ZESTRIL) 2.5 mg tablet Take 1 Tab by mouth daily. Qty: 30 Tab, Refills: 11      melatonin 1 mg tablet Take 1 mg by mouth nightly. traZODone (DESYREL) 50 mg tablet Take  by mouth nightly.  1/2 pill hs      nitroglycerin (NITROSTAT) 0.4 mg SL tablet 1 Tab by SubLINGual route every five (5) minutes as needed for Chest Pain. Qty: 25 Tab, Refills: 11         STOP taking these medications       HYDROcodone-acetaminophen (NORCO) 7.5-325 mg per tablet Comments:   Reason for Stopping:               * Follow-up Care/Patient Instructions:   Activity: PT/OT  GAIT TRAINING - WBAT LEFT LE; NWB LEFT UE  QUAD STRENGTHENING  HIP ABDUCTORS  NO THERAPY FOR LEFT ARM  LEFT ARM SLING  KI TO LEFT LEG - ON IN BED OR CHAIR; OFF WHEN AMBULATING  HIP PRECAUTIONS    Diet: Regular Diet    Wound Care: DO NOT CHANGE LEFT ARM DRESSING  CHANGE LEFT HIP DRESSING Q 3 DAYS AND PRN - 4X4S, ABD, AND TEGADERM    Follow-up Information     DR Maria Elena Hurtado - 1/17/17 @ 10:10 AM          Signed:  Nell Suarez NP  1/13/2017  1:22 PM

## 2017-01-13 NOTE — PROGRESS NOTES
Problem: Self Care Deficits Care Plan (Adult)  Goal: *Acute Goals and Plan of Care (Insert Text)  1. Yajaira Dumont will complete upper body dressing with minimal assistance. 2. Yajaira Dumont will complete self-feeding with supervision/setup. 3. Yajaira Dumont will complete grooming/oral care with supervision/setup. 4. Yajaira Dumont will complete toilet transfers with minimal assistance. Time frame: 4 - 7 visits      OCCUPATIONAL THERAPY: Initial Assessment and Treatment Day: 1st 1/13/2017  INPATIENT: Hospital Day: 2  [place] / Product Type: Medicare /      NAME/AGE/GENDER: Yajaira Dumont is a 80 y.o. female           PRIMARY DIAGNOSIS:  Closed Monteggia's fracture of left ulna, initial encounter [S52.272A]  Mechanical complic of internal orthopedic device, implant or graft, initial encounter ([facility] [T84.498A] <principal problem not specified> <principal problem not specified>  Procedure(s) (LRB):  LEFT PROXIMAL ULNA OPEN REDUCTION INTERNAL FIXATION/LEFT RADIAL HEAD DISLOCATION (Left)  HARDWARE REMOVAL LEFT HIP WITH CONVERSION TO HIP HEMIARTHROPLASTY (Left)  1 Day Post-Op  LUE non-weightbearing; LLE WBAT  ICD-10: Treatment Diagnosis:        · Pain in Left Wrist (M25.532)  · Other lack of cordination (R27.8)  · History of falling (Z91.81)   Precautions/Allergies:         Pcn [penicillins]       ASSESSMENT:      Ms. Zoey Nassar presents s/p fall, s/p Monteggia fracture, L hip fracture now L ulna/radius ORIF and L hip hemiarthroplasty. She was able to extend/flex at her MP/IP joints and slightly at shoulder, but difficult with heavy cast.   Yajaira Dumont presents with the following problems and would benefit from occupational therapy to maximize independence with self-care,instrumental activities of daily living, and functional transfers/mobility for activities of daily living/instrumental activities of daily living via the stated goals.       This section established at most recent assessment   PROBLEM LIST (Impairments causing functional limitations):  1. Decreased Strength  2. Decreased ADL/Functional Activities  3. Decreased Transfer Abilities  4. Decreased Balance  5. Increased Pain  6. Decreased Activity Tolerance  7. Decreased Pacing Skills  8. Decreased Flexibility/Joint Mobility  9. Decreased [place] with Home Exercise Program  10. Decreased Cognition    INTERVENTIONS PLANNED: (Benefits and precautions of occupational therapy have been discussed with the patient.)  1. Activities of daily living training  2. Theraputic activity  3. Theraputic exercise      TREATMENT PLAN: Frequency/Duration: Follow patient 6 times/week to address above goals. Rehabilitation Potential For Stated Goals: GOOD      RECOMMENDED REHABILITATION/EQUIPMENT: (at time of discharge pending progress): Continue Skilled Therapy. OCCUPATIONAL PROFILE AND HISTORY:   History of Present Injury/Illness (Reason for Referral):  Per MD H & P:   Past Medical History/Comorbidities:   Ms. Tiffanie Conner  has a past medical history of AICD (automatic cardioverter/defibrillator) present; Anemia (10/25/2012); Aortic valve regurgitation; Arrhythmia; CAD (coronary artery disease) (9/4/2012); CHF (congestive heart failure) ([address] (9/4/2012); CHF (congestive heart failure) ([address] (9/4/2012); Chronic kidney disease, stage II (mild); Chronic systolic heart failure ([address] (4/16/2013); Diverticulitis; Encounter for long-term (current) use of other medications (11/19/2012); Esophagitis (2010); Falls; Femur fracture, left ([address] (12/02/2016); Fracture of left radius (12/02/2016); Fracture of wrist (2003); HLD (hyperlipidemia) (9/4/2012); HTN (hypertension) (9/4/2012); Insomnia; Ischemic cardiomyopathy, chronic; Left bundle branch block (LBBB); Osteoporosis (9/4/2012); S/P PTCA (percutaneous transluminal coronary angioplasty); Sigmoid stricture ([address] Stiffness in joint; and Varicose vein (2011).   Ms. Josue Barger  has a past surgical history that includes orthopaedic; ptca (2012); cyst removal (2009); gyn; and implantable cardioverter defibrillator. Social History/Living Environment:   Home Environment: Skilled nursing facility (STR)  One/Two Story Residence: One story  Living Alone: No  Support Systems: Child(nkechi)  Patient Expects to be Discharged to[de-identified] Skilled nursing facility  Current DME Used/Available at Home: Shower chair, Grab bars  Prior Level of Function/Work/Activity:  Lives alone. Drives. Manages her own medications. Cooks for herself. Completes all of her own activities of daily living/  Personal Factors:          Age:  80 y.o. Number of Personal Factors/Comorbidities that affect the Plan of Care: Expanded review of therapy/medical records (1-2):  MODERATE COMPLEXITY   ASSESSMENT OF OCCUPATIONAL PERFORMANCE[de-identified]   Activities of Daily Living:           Basic ADLs (From Assessment) Complex ADLs (From Assessment)   Basic ADL  Feeding: Moderate assistance  Oral Facial Hygiene/Grooming: Moderate assistance  Bathing: Total assistance  Upper Body Dressing: Total assistance  Lower Body Dressing: Total assistance  Toileting: Total assistance Instrumental ADL  Meal Preparation: Total assistance  Homemaking: Total assistance  Medication Management: Total assistance  Financial Management: Total assistance   Grooming/Bathing/Dressing Activities of Daily Living     Cognitive Retraining  Safety/Judgement: Fall prevention               Upper Body Dressing Assistance  Orthotics(Brace): Total assistance (dependent) (LUE sling)     Lower Body Dressing Assistance  Dressing Assistance: Total assistance(dependent) (knee immobilizer) Bed/Mat Mobility  Supine to Sit: Moderate assistance;Assist x2  Sit to Stand: Maximum assistance;Assist x2  Bed to Chair: Maximum assistance;Assist x2  Scooting:  Moderate assistance;Assist x2          Most Recent Physical Functioning:   Gross Assessment:  AROM: Grossly decreased, non-functional (LUE (active finger extension/flexion);R decreased/functional)  Strength: Grossly decreased, non-functional (LUE (active finger extension/flexion);R decreased/functional)               Posture:  Posture (WDL): Exceptions to WDL  Posture Assessment: Forward head, Rounded shoulders, Kyphosis  Balance:  Sitting: Intact; Impaired  Sitting - Static: Fair (occasional)  Sitting - Dynamic: Fair (occasional)  Standing: Impaired  Standing - Static: Poor  Standing - Dynamic : Poor Bed Mobility:  Supine to Sit: Moderate assistance;Assist x2  Scooting: Moderate assistance;Assist x2  Wheelchair Mobility:     Transfers:  Sit to Stand: Maximum assistance;Assist x2  Stand to Sit: Maximum assistance;Assist x2  Bed to Chair: Maximum assistance;Assist x2                 Patient Vitals for the past 6 hrs:       BP BP Patient Position SpO2 Pulse   17 1101 95/40 Sitting;Post activity - -   17 1118 101/40 At rest 91 % 86   17 1408 108/41 - - 77   17 1428 (!) 104/39 Sitting 90 % 81        Mental Status  Neurologic State: Alert  Orientation Level: Oriented to person, Disoriented to time, Disoriented to place  Cognition: Follows commands  Perception: Appears intact  Perseveration: No perseveration noted  Safety/Judgement: Fall prevention                               Physical Skills Involved:  1. Range of Motion  2. Balance  3. Mobility  4. Strength  5. Fine or Gross Motor Coordination  6. Dexterity Cognitive Skills Affected (resulting in the inability to perform in a timely and safe manner):  1. Problem Solving Psychosocial Skills Affected:  1. Habits  2. Routines and Behaviors  3. Active Use of Coping Strategies  4. Environmental Adaptations   Number of elements that affect the Plan of Care: 3-5:  MODERATE COMPLEXITY   CLINICAL DECISION MAKIN [facility] [address] [address] AM-PAC 6 Clicks   Basic Mobility Inpatient Short Form  How much help from another person does the patient currently need. ..  Total A Lot A Little None 1.  Putting on and taking off regular lower body clothing? [X] 1   [ ] 2   [ ] 3   [ ] 4   2. Bathing (including washing, rinsing, drying)? [X] 1   [ ] 2   [ ] 3   [ ] 4   3. Toileting, which includes using toilet, bedpan or urinal?   [X] 1   [ ] 2   [ ] 3   [ ] 4   4. Putting on and taking off regular upper body clothing? [X] 1   [ ] 2   [ ] 3   [ ] 4   5. Taking care of personal grooming such as brushing teeth? [ ] 1   [X] 2   [ ] 3   [ ] 4   6. Eating meals? [ ] 1   [X] 2   [ ] 3   [ ] 4   © 2007, Trustees of [address] [place] [address] [address] [address], under license to [employer]. All rights reserved    Score:  Initial: 8 Most Recent: X (Date: -- )     Interpretation of Tool:  Represents activities that are increasingly more difficult (i.e. Bed mobility, Transfers, Gait). Score 24 23 22-20 19-15 14-10 9-7 6       Modifier CH CI CJ CK CL CM CN         · Self Care:               - CURRENT STATUS:    CM - 80%-99% impaired, limited or restricted               - GOAL STATUS:           CK - 40%-59% impaired, limited or restricted               - D/C STATUS:                       ---------------To be determined---------------  [place] / Product Type: Medicare /       Medical Necessity:     · Patient demonstrates good rehab potential due to higher previous functional level. Reason for Services/Other Comments:  · Patient continues to require skilled intervention due to decreased independence with ADL, instrumental ADL, and functional mobility for ADL. Use of outcome tool(s) and clinical judgement create a POC that gives a: MODERATE COMPLEXITY             TREATMENT:   (In addition to Assessment/Re-Assessment sessions the following treatments were rendered)      Pre-treatment Symptoms/Complaints:    Pain: Initial:   Pain Intensity 1:  (complains of LLE pain - did not rate)  Post Session:  Same as pre.       Self Care: (9 minutes): Procedure(s) (per grid) utilized to improve and/or restore self-care/home management as related to dressing. Required moderate verbal and tactile cueing to facilitate activities of daily living skills. Treatment/Session Assessment:         Response to Treatment:  Tolerates little active movement. Able to extend digits and lift at shoulder, but need to continue to work on to increase independence with activities of daily living . Interdisciplinary Collaboration:   · Occupational Therapist  · Registered Nurse     After treatment position/precautions:   · Up in chair  · Bed/Chair-wheels locked  · Call light within reach  · RN notified  · PTA/rehab tech at bedside     Compliance with Program/Exercises: Will assess as treatment progresses. Recommendations/Intent for next treatment session:   \"Next visit will focus on advancements to more challenging activities\"       Total Treatment Duration:  OT Patient Time In/Time Out  Time In: 1443  Time Out: [address] BISI Domingo, OTR/L

## 2017-01-13 NOTE — PROGRESS NOTES
Care Management Interventions  Mode of Transport at Discharge: BLS  Transition of Care Consult (CM Consult): Discharge Planning  Discharge Durable Medical Equipment: No  Physical Therapy Consult: Yes  Occupational Therapy Consult: Yes  Speech Therapy Consult: No  Current Support Network: Own Home, [address], Family Lives Nearby (pt is a STR resident at [facility] [place] family is paying to hold the pt's bed)  Confirm Follow Up Transport: Family  Plan discussed with Pt/Family/Caregiver: Yes  Freedom of Choice Offered: Yes  Discharge Location  Discharge Placement: Rehab Unit Subacute ([facility])    Pt is a 79 yo female admitted for a left proximal ulna ORIF and left hip conversion to nima-arthroplasty. Pt is a STR resident at [facility] and can return there at discharge. The pt/family is paying to hold the bed. SW following to facilitate pt's transfer to rehab at discharge.

## 2017-01-13 NOTE — PROGRESS NOTES
Problem: Mobility Impaired (Adult and Pediatric)  Goal: *Acute Goals and Plan of Care (Insert Text)  STG:  (1.)Ms. Hanna Dimas will perform bed mobility with MODERATE ASSIST x1 within 3 day(s). (2.)Ms. Hanna Dimas will demonstrate good dynamic sitting balance within 3 day(s). (3.)Ms. Hanna Dimas will transfer from bed to chair and chair to bed with MODERATE ASSIST x1 using the least restrictive device within 3 day(s). (4.)Ms. Hanna Dimas will tolerate 15+ minutes of therapeutic activity/exercise while maintaining stable vitals within 3 day(s). LTG:  (1.)Ms. Hanna Dimas will perform bed mobility with MINIMAL ASSISTANCE within 7 day(s). (2.)Ms. Hanna Dimas will transfer from bed to chair and chair to bed with MINIMAL ASSISTANCE x1 using the least restrictive device within 7 day(s). (3.)Ms. Hanan Dimas will ambulate 10+ft with MINIMAL ASSISTANCE x1 utilizing least restrictive assistive device within 7 day(s). (4.)Ms. Hanna Dimas will tolerate 25+ minutes of therapeutic activity/exercise while maintaining stable vitals within 7 day(s).    ________________________________________________________________________________________________      PHYSICAL THERAPY: INITIAL ASSESSMENT, TREATMENT DAY: DAY OF ASSESSMENT, AM    1/13/2017  INPATIENT: Hospital Day: 2  [place] / Product Type: Medicare /       TIARAB L UE; Romaning  ELLENAT L PARRIS     NAME/AGE/GENDER: Romario Carpenter is a 80 y.o. female           PRIMARY DIAGNOSIS: Closed Monteggia's fracture of left ulna, initial encounter [S52.466A]  Mechanical complic of internal orthopedic device, implant or graft, initial encounter [facility] [Y04.560V] <principal problem not specified> <principal problem not specified>  Procedure(s) (LRB):  LEFT PROXIMAL ULNA OPEN REDUCTION INTERNAL FIXATION/LEFT RADIAL HEAD DISLOCATION (Left)  HARDWARE REMOVAL LEFT HIP WITH CONVERSION TO HIP HEMIARTHROPLASTY (Left)  1 Day Post-Op  ICD-10: Treatment Diagnosis:       · Generalized Muscle Weakness (M62.81)  · Difficulty in walking, Not elsewhere classified (R26.2)   Precaution/Allergies:  Pcn [penicillins]       ASSESSMENT:      Ms. Vadim Kidd presents is a 80year old female 1 day s/p left proximal ulnar ORIF and left hip hemiarthroplasty. Patient is NWB on L UE and WBAT L LE with hip precautions. Patient seen this AM for initial physical therapy evaluation: presents sitting on edge of bed with RN staff and endorses 5/10 pain. Appears mildly confused but follows commands well with mild impulsivity. Patient has been in [address] since previous hip surgery and primary utilizing a WC and progressing with transfers; prior to initial hip injury in November 2016, patient was independent with ADLs and ambulation. Today, sling appreciated to L UE and knee immobilizer removed for mobility. Patient performed bed mobility with with moderate assistance x2 per nursing staff, transfers with maximal assistance x2 and gait belt from bed to [facility] to chair. Patient able to bear weight through B LEs to assist with transfer. Ms. Vadim Kidd presents with decreased functional mobility and balance/gait status from baseline. Recommend continued skilled PT services to address stated deficits. Will follow and progress toward stated goals during acute stay. Plan to progress toward hemiwalker for gait training as patient is able. This section established at most recent assessment   PROBLEM LIST (Impairments causing functional limitations):  1. Decreased Strength  2. Decreased ADL/Functional Activities  3. Decreased Transfer Abilities  4. Decreased Ambulation Ability/Technique  5. Decreased Balance  6. Increased Pain  7. Decreased Activity Tolerance  8. Decreased Flexibility/Joint Mobility  9. Decreased Knowledge of Precautions  10. Decreased [place] with Home Exercise Program  11. Decreased Cognition    INTERVENTIONS PLANNED: (Benefits and precautions of physical therapy have been discussed with the patient.)  1. Balance Exercise  2.  Bed Mobility  3. Family Education  4. Gait Training  5. Home Exercise Program (HEP)  6. Manual Therapy  7. Range of Motion (ROM)  8. Therapeutic Activites  9. Therapeutic Exercise/Strengthening  10. Transfer Training  11. Group Therapy      TREATMENT PLAN: Frequency/Duration: twice daily for duration of hospital stay  Rehabilitation Potential For Stated Goals: GOOD      RECOMMENDED REHABILITATION/EQUIPMENT: (at time of discharge pending progress): Continue Skilled Therapy. HISTORY:   History of Present Injury/Illness (Reason for Referral):  Difficulty in Walking; S/p above procedures  Past Medical History/Comorbidities:   Ms. Amina Perea  has a past medical history of AICD (automatic cardioverter/defibrillator) present; Anemia (10/25/2012); Aortic valve regurgitation; Arrhythmia; CAD (coronary artery disease) (9/4/2012); CH[facility] (congestive heart failure) ([facility] (9/4/2012); CHF (congestive heart failure) ([facility] (9/4/2012); Chronic kidney disease, stage II (mild); Chronic systolic heart failure ([facility] (4/16/2013); Diverticulitis; Encounter for long-term (current) use of other medications (11/19/2012); Esophagitis (2010); Falls; Femur fracture, left ([facility] (12/02/2016); Fracture of left radius (12/02/2016); Fracture of wrist (2003); HLD (hyperlipidemia) (9/4/2012); HTN (hypertension) (9/4/2012); Insomnia; Ischemic cardiomyopathy, chronic; Left bundle branch block (LBBB); Osteoporosis (9/4/2012); S/P PTCA (percutaneous transluminal coronary angioplasty); Sigmoid stricture ([facility] Stiffness in joint; and Varicose vein (2011). Ms. Amina Perea  has a past surgical history that includes orthopaedic; ptca (2012); cyst removal (2009); gyn; and implantable cardioverter defibrillator.   Social History/Living Environment:   Home Environment: Skilled nursing facility (STR)  One/Two Story Residence: One story  Living Alone: No  Support Systems: Child(nkechi)  Patient Expects to be Discharged to[de-identified] [place] [address] DME Used/Available at Home: Shower chair, Grab bars  Prior Level of Function/Work/Activity:  STR prior to this admission. S/p previous hip surgery. Primarily utilizing a WC for mobility and progressing toward transfers. Prior to 2016, patient was independent with functional mobility and ADLs. Number of Personal Factors/Comorbidities that affect the Plan of Care: 3+: HIGH COMPLEXITY   EXAMINATION:   Most Recent Physical Functioning:   Gross Assessment:  AROM: Grossly decreased, non-functional (L UE; generally decreased L LE)  Strength: Generally decreased, functional               Posture:  Posture (WDL): Exceptions to WDL  Posture Assessment: Forward head, Rounded shoulders, Kyphosis  Balance:  Sitting: Intact; Impaired  Sitting - Static: Fair (occasional)  Sitting - Dynamic: Fair (occasional)  Standing: Impaired  Standing - Static: Poor  Standing - Dynamic : Poor Bed Mobility:  Supine to Sit: Moderate assistance;Assist x2  Scooting: Moderate assistance;Assist x2  Wheelchair Mobility:     Transfers:  Sit to Stand: Maximum assistance;Assist x2  Stand to Sit: Maximum assistance;Assist x2  Bed to Chair: Maximum assistance;Assist x2  Gait:             Body Structures Involved:  1. Bones  2. Joints  3. Muscles  4. Ligaments Body Functions Affected:  1. Neuromusculoskeletal  2. Movement Related Activities and Participation Affected:  1. General Tasks and Demands  2. Mobility  3. Self Care  4. Domestic Life   Number of elements that affect the Plan of Care: 4+: HIGH COMPLEXITY   CLINICAL PRESENTATION:   Presentation: Evolving clinical presentation with changing clinical characteristics: MODERATE COMPLEXITY   CLINICAL DECISION MAKIN [facility] Mobility Inpatient Short Form  How much difficulty does the patient currently have. .. Unable A Lot A Little None   1. Turning over in bed (including adjusting bedclothes, sheets and blankets)? [ ] 1   [X] 2   [ ] 3   [ ] 4   2.   Sitting down on and standing up from a chair with arms ( e.g., wheelchair, bedside commode, etc.)   [ ] 1   [X] 2   [ ] 3   [ ] 4   3. Moving from lying on back to sitting on the side of the bed? [ ] 1   [X] 2   [ ] 3   [ ] 4   How much help from another person does the patient currently need. .. Total A Lot A Little None   4. Moving to and from a bed to a chair (including a wheelchair)? [ ] 1   [X] 2   [ ] 3   [ ] 4   5. Need to walk in hospital room? [ ] 1   [X] 2   [ ] 3   [ ] 4   6. Climbing 3-5 steps with a railing? [ ] 1   [X] 2   [ ] 3   [ ] 4   © 2007, Trustees of [address] [place] [address] [address] [address], under license to [employer]. All rights reserved    Score:  Initial: 12 Most Recent: 12 (Date: 1/13/17 )     Interpretation of Tool:  Represents activities that are increasingly more difficult (i.e. Bed mobility, Transfers, Gait). Score 24 23 22-20 19-15 14-10 9-7 6       Modifier CH CI CJ CK CL CM CN         · Mobility - Walking and Moving Around:               - CURRENT STATUS:    CL - 60%-79% impaired, limited or restricted               - GOAL STATUS:           CJ - 20%-39% impaired, limited or restricted               - D/C STATUS:                       ---------------To be determined---------------  [place] / Product Type: Medicare /       Medical Necessity:     · Patient demonstrates good rehab potential due to higher previous functional level. Reason for Services/Other Comments:  · Patient continues to require skilled intervention due to decreased functional mobility and balance/gait status from baseline. .   Use of outcome tool(s) and clinical judgement create a POC that gives a: Questionable prediction of patient's progress: MODERATE COMPLEXITY                 TREATMENT:   (In addition to Assessment/Re-Assessment sessions the following treatments were rendered)   Pre-treatment Symptoms/Complaints:    Pain: Initial:   Pain Intensity 1: 5  Pain Location 1: Leg  Pain Orientation 1: Left  Pain Intervention(s) 1: Ambulation/Increased Activity, Emotional support, Repositioned, Rest  Post Session:  Unchanged; 5/10  BP: 95/40; Mild dizziness with mobility; resolves with rest.      Initial Evaluation (15 Minutes)  Therapeutic Activity: (    10 Minutes): Therapeutic activities including bed mobility, transfer training, toileting, balance training, safety awareness training, and patient education to improve mobility, strength, balance and coordination. Required minimal   to promote static and dynamic balance in standing and promote coordination of left, upper extremity(s), lower extremity(s). Treatment/Session Assessment:    · Interdisciplinary Collaboration:  · Physical Therapist  · Registered Nurse  · After treatment position/precautions:  · Up in chair  · Bed alarm/tab alert on  · Bed/Chair-wheels locked  · Bed in low position  · Call light within reach  · RN notified  · Family at bedside  · [place] alarm activated; Needs met; dizziness resolved. · Compliance with Program/Exercises: Will assess as treatment progresses. · Recommendations/Intent for next treatment session: \"Next visit will focus on advancements to more challenging activities and reduction in assistance provided\".   ·   Total Treatment Duration:  PT Patient Time In/Time Out  Time In: 1101  Time Out: [address]

## 2017-01-13 NOTE — PROGRESS NOTES
Dual Skin Assessment    Skin assessment completed with Reed Joyner RN. Patient has a small abrasion/bump to her right thigh. Patient has scattered bruises and moles. Bilateral feet are dry and flaky with thickened toenails. Pacemaker/ICD noted under skin of left chest.  No breakdown to buttocks/sacrum noted. Patient has two surgical incisions, one to left arm and one to left leg, See below. Left leg has a knee immobilizer and left arm is in a sling. No other skin issues noted. Wound Hip Left;Lateral (Active)   DRESSING STATUS Clean, dry, and intact 1/12/2017  5:38 PM   DRESSING TYPE 4 x 4;ABD pad;Staples; Sutures; Topical skin adhesive/glue 1/12/2017  5:38 PM   SPLINT TYPE/MATERIAL Knee immobilizer 1/12/2017  5:38 PM       Wound Elbow Left (Active)   DRESSING STATUS Clean, dry, and intact 1/12/2017  5:38 PM   DRESSING TYPE 4 x 4;Staples; Sutures; Xeroform 1/12/2017  5:38 PM   SPLINT TYPE/MATERIAL Sugar tong 1/12/2017  5:38 PM        Mike Becker RN    1/12/2017 10:13 PM

## 2017-01-14 NOTE — PROGRESS NOTES
TRANSFER - OUT REPORT:    Verbal report given to Crispin Cabrera RN on Cesar Amezcua  being transferred to [facility] for routine progression of care       Report consisted of patients Situation, Background, Assessment and   Recommendations(SBAR). Information from the following report(s) SBAR, Kardex and OR Summary was reviewed with the receiving nurse. Lines:       Opportunity for questions and clarification was provided. Patient transported with: all personal belongings.

## 2017-01-14 NOTE — PROGRESS NOTES
Hospitalist Progress Note    2017  Admit Date: 2017 10:38 AM   NAME: Polo Garcia   :  7/15/1926   MRN:  [id number]   Attending: Evi Donis MD  PCP:  Connie Cabrera MD    SUBJECTIVE:   79 YO female patient with an extensive PMH of systolic CHF with EF 15 - 20 % s/p ICD placement, aortic regurgitation, ischemic cardiopathy who underwent a left proximal ulna ORIF and hardware removal of left hip with conversion to hip hemiarthroplasty. Hospitalist consulted regarding hypotension/Hypoxia. Her home BP meds have been held and she was started on IV albumin/IVF     - stable this AM although lethargic, minimally conversant. BP dropped again this afternoon after albumin dc'd.        Review of Systems negative with exception of pertinent positives noted above  PHYSICAL EXAM     Visit Vitals    BP (!) 95/35 (BP 1 Location: Right arm, BP Patient Position: At rest)    Pulse 70    Temp 98.1 °F (36.7 °C)    Resp 16    Ht 5' 2\" (1.575 m)    Wt 55.8 kg (123 lb)    SpO2 92%    BMI 22.5 kg/m2      Temp (24hrs), Av.9 °F (36.6 °C), Min:97.6 °F (36.4 °C), Max:98.2 °F (36.8 °C)    Oxygen Therapy  O2 Sat (%): 92 % (17 1123)  Pulse via Oximetry: 70 beats per minute (17 1803)  O2 Device: Nasal cannula (17 878)  O2 Flow Rate (L/min): 3 l/min (17 1738)  No intake or output data in the 24 hours ending 17 1547   General: No acute distress , lethargic  Lungs:  Diminished w/o rales/rhonchi  Heart:  Regular rate and rhythm,  No murmur, rub, or gallop  Abdomen: Soft, Non distended, Non tender, Positive bowel sounds  Extremities: No cyanosis, clubbing or edema  Neurologic:  No focal deficits    ASSESSMENT      Active Hospital Problems    Diagnosis Date Noted    Monteggia fracture, closed 2017     A/P  - Hypotension - continue to hold coreg/lisinopril/lasix. Continue slow ivf and iv albumin.   - Hx of Ischemic Cardiomyopathy - EF 15%.  Will request Cardiology eval for help in managing volume/home meds  - Pneumonia - LLL infiltrate, procal elevated. Start Levaquin.  Pt does NOT appear Septic.  - S/p left hip hardware removal, hemiathroplasty and left ulnar ORIF - as per ortho/primary    Dispo - transfer to SNF held today at my request.     DVT Prophylaxis: lovenox sq    Signed By: Arlette Yang,      January 14, 2017

## 2017-01-14 NOTE — PROGRESS NOTES
Problem: Mobility Impaired (Adult and Pediatric)  Goal: *Acute Goals and Plan of Care (Insert Text)  STG:  (1.)Ms. Elise De Los Santos will perform bed mobility with MODERATE ASSIST x1 within 3 day(s). (2.)Ms. Elise De Los Santos will demonstrate good dynamic sitting balance within 3 day(s). (3.)Ms. Elise De Los Santos will transfer from bed to chair and chair to bed with MODERATE ASSIST x1 using the least restrictive device within 3 day(s). (4.)Ms. Elise De Los Santos will tolerate 15+ minutes of therapeutic activity/exercise while maintaining stable vitals within 3 day(s). LTG:  (1.)Ms. Elise De Los Santos will perform bed mobility with MINIMAL ASSISTANCE within 7 day(s). (2.)Ms. Elise De Los Santos will transfer from bed to chair and chair to bed with MINIMAL ASSISTANCE x1 using the least restrictive device within 7 day(s). (3.)Ms. Elise De Los Santos will ambulate 10+ft with MINIMAL ASSISTANCE x1 utilizing least restrictive assistive device within 7 day(s). (4.)Ms. Elise De Los Santos will tolerate 25+ minutes of therapeutic activity/exercise while maintaining stable vitals within 7 day(s).    ________________________________________________________________________________________________      PHYSICAL THERAPY: Daily Note, Treatment Day: 1st and AM    1/14/2017  INPATIENT: Hospital Day: 3  [place] / [place] / Product Type: Medicare /       NWB L UE; Sling  WBAT L LE     NAME/AGE/GENDER: Magdiel Herrera is a 80 y.o. female           PRIMARY DIAGNOSIS: Closed Monteggia's fracture of left ulna, initial encounter [S52.752A]  Mechanical complic of internal orthopedic device, implant or graft, initial encounter ([facility] [U38.196C] <principal problem not specified> <principal problem not specified>  Procedure(s) (LRB):  LEFT PROXIMAL ULNA OPEN REDUCTION INTERNAL FIXATION/LEFT RADIAL HEAD DISLOCATION (Left)  HARDWARE REMOVAL LEFT HIP WITH CONVERSION TO HIP HEMIARTHROPLASTY (Left)  2 Days Post-Op  ICD-10: Treatment Diagnosis:       · Generalized Muscle Weakness (M62.81)  · Difficulty in walking, Not elsewhere classified (R26.2)   Precaution/Allergies:  Pcn [penicillins]       ASSESSMENT:      Ms. Corazon Lozano presents is a 80year old female 1 day s/p left proximal ulnar ORIF and left hip hemiarthroplasty. Patient is NWB on L UE and WBAT L LE with hip precautions. Patient seen this AM for  physical therapy. Presents supine in bed with family present. Appears mildly confused but follows commands well with mild impulsivity. Patient has been in [address] since previous hip surgery and primary utilizing a WC and progressing with transfers; prior to initial hip injury in November 2016, patient was independent with ADLs and ambulation. Today, had sling on L UE. Knee immobilizer was removed for mobility. Sat to EOB with max assist x 2, moaning, shaking her head and crying out. Sit to stand and transfer to the right with max assist x 2 and VC's for pt to push up. Patient able to bear weight through B LEs to assist with transfer and sat in chair. Ms. Corazon Lozano presents with decreased functional mobility and balance/gait status from baseline. Recommend continued skilled PT services to address stated deficits. Will follow and progress toward stated goals during acute stay. Plan to progress toward hemiwalker for gait training as patient is able. This section established at most recent assessment   PROBLEM LIST (Impairments causing functional limitations):  1. Decreased Strength  2. Decreased ADL/Functional Activities  3. Decreased Transfer Abilities  4. Decreased Ambulation Ability/Technique  5. Decreased Balance  6. Increased Pain  7. Decreased Activity Tolerance  8. Decreased Flexibility/Joint Mobility  9. Decreased Knowledge of Precautions  10. Decreased [place] with Home Exercise Program  11. Decreased Cognition    INTERVENTIONS PLANNED: (Benefits and precautions of physical therapy have been discussed with the patient.)  1. Balance Exercise  2. Bed Mobility  3.  Family Education  4. Gait Training  5. Home Exercise Program (HEP)  6. Manual Therapy  7. Range of Motion (ROM)  8. Therapeutic Activites  9. Therapeutic Exercise/Strengthening  10. Transfer Training  11. Group Therapy      TREATMENT PLAN: Frequency/Duration: twice daily for duration of hospital stay  Rehabilitation Potential For Stated Goals: GOOD      RECOMMENDED REHABILITATION/EQUIPMENT: (at time of discharge pending progress): Continue Skilled Therapy. HISTORY:   History of Present Injury/Illness (Reason for Referral):  Difficulty in Walking; S/p above procedures  Past Medical History/Comorbidities:   Ms. Piotr Muniz  has a past medical history of AICD (automatic cardioverter/defibrillator) present; Anemia (10/25/2012); Aortic valve regurgitation; Arrhythmia; CAD (coronary artery disease) (9/4/2012); CHF (congestive heart failure) ([facility] (9/4/2012); CHF (congestive heart failure) ([facility] (9/4/2012); Chronic kidney disease, stage II (mild); Chronic systolic heart failure ([address] (4/16/2013); Diverticulitis; Encounter for long-term (current) use of other medications (11/19/2012); Esophagitis (2010); Falls; Femur fracture, left ([address] (12/02/2016); Fracture of left radius (12/02/2016); Fracture of wrist (2003); HLD (hyperlipidemia) (9/4/2012); HTN (hypertension) (9/4/2012); Insomnia; Ischemic cardiomyopathy, chronic; Left bundle branch block (LBBB); Osteoporosis (9/4/2012); S/P PTCA (percutaneous transluminal coronary angioplasty); Sigmoid stricture ([address] Stiffness in joint; and Varicose vein (2011). Ms. Piotr Muniz  has a past surgical history that includes orthopaedic; ptca (2012); cyst removal (2009); gyn; and implantable cardioverter defibrillator.   Social History/Living Environment:   Home Environment: Skilled nursing facility ([facility])  One/Two Story Residence: One story  Living Alone: No  Support Systems: Child(nkechi)  Patient Expects to be Discharged to[de-identified] Skilled nursing facility  Current DME Used/Available at Home: Shower chair, Grab bars  Prior Level of Function/Work/Activity:  [facility] prior to this admission. S/p previous hip surgery. Primarily utilizing a WC for mobility and progressing toward transfers. Prior to 2016, patient was independent with functional mobility and ADLs. Number of Personal Factors/Comorbidities that affect the Plan of Care: 3+: HIGH COMPLEXITY   EXAMINATION:   Most Recent Physical Functioning:   Gross Assessment:                  Posture:  Posture (WDL): Exceptions to WDL  Posture Assessment: Forward head, Rounded shoulders  Balance:  Sitting: Impaired  Sitting - Static: Fair (occasional)  Sitting - Dynamic: Fair (occasional)  Standing: Impaired  Standing - Static: Poor  Standing - Dynamic : Poor Bed Mobility:  Supine to Sit: Moderate assistance;Assist x2  Wheelchair Mobility:     Transfers:  Sit to Stand: Maximum assistance;Assist x2  Stand to Sit: Maximum assistance;Assist x2  Gait:             Body Structures Involved:  1. Bones  2. Joints  3. Muscles  4. Ligaments Body Functions Affected:  1. Neuromusculoskeletal  2. Movement Related Activities and Participation Affected:  1. General Tasks and Demands  2. Mobility  3. Self Care  4. Domestic Life   Number of elements that affect the Plan of Care: 4+: HIGH COMPLEXITY   CLINICAL PRESENTATION:   Presentation: Evolving clinical presentation with changing clinical characteristics: MODERATE COMPLEXITY   CLINICAL DECISION MAKIN [facility] Inpatient Short Form  How much difficulty does the patient currently have. .. Unable A Lot A Little None   1. Turning over in bed (including adjusting bedclothes, sheets and blankets)? [ ] 1   [X] 2   [ ] 3   [ ] 4   2. Sitting down on and standing up from a chair with arms ( e.g., wheelchair, bedside commode, etc.)   [ ] 1   [X] 2   [ ] 3   [ ] 4   3. Moving from lying on back to sitting on the side of the bed?    [ ] 1   [X] 2   [ ] 3   [ ] 4   How much help from another person does the patient currently need. .. Total A Lot A Little None   4. Moving to and from a bed to a chair (including a wheelchair)? [ ] 1   [X] 2   [ ] 3   [ ] 4   5. Need to walk in hospital room? [ ] 1   [X] 2   [ ] 3   [ ] 4   6. Climbing 3-5 steps with a railing? [ ] 1   [X] 2   [ ] 3   [ ] 4   © 2007, Trustees of [address] [place] [address] [address] [address], under license to [employer]. All rights reserved    Score:  Initial: 12 Most Recent: 12 (Date: 1/13/17 )     Interpretation of Tool:  Represents activities that are increasingly more difficult (i.e. Bed mobility, Transfers, Gait). Score 24 23 22-20 19-15 14-10 9-7 6       Modifier CH CI CJ CK CL CM CN         · Mobility - Walking and Moving Around:               - CURRENT STATUS:    CL - 60%-79% impaired, limited or restricted               - GOAL STATUS:           CJ - 20%-39% impaired, limited or restricted               - D/C STATUS:                       ---------------To be determined---------------  [place] / [place] / Product Type: Medicare /       Medical Necessity:     · Patient demonstrates good rehab potential due to higher previous functional level. Reason for Services/Other Comments:  · Patient continues to require skilled intervention due to decreased functional mobility and balance/gait status from baseline. .   Use of outcome tool(s) and clinical judgement create a POC that gives a: Questionable prediction of patient's progress: MODERATE COMPLEXITY                 TREATMENT:   (In addition to Assessment/Re-Assessment sessions the following treatments were rendered)   Pre-treatment Symptoms/Complaints:    Pain: Initial:      Post Session:  Unable to rate but grimacing and moaning with movement. Therapeutic Activity: (    10 Minutes):   Therapeutic activities including bed mobility, transfer training, balance training, safety awareness training, and patient education to improve mobility, strength, balance and coordination. Required minimal   to promote static and dynamic balance in standing and promote coordination of left, upper extremity(s), lower extremity(s). Treatment/Session Assessment:    · Interdisciplinary Collaboration:  · Physical Therapy Assistant, Registered Nurse, Rehabilitation Attendant and Certified Nursing Assistant/Patient Care Technician  · After treatment position/precautions:  · Up in chair, Bed alarm/tab alert on, Bed/Chair-wheels locked, Bed in low position, Call light within reach, RN notified and Family at bedside  · Compliance with Program/Exercises: Will assess as treatment progresses. · Recommendations/Intent for next treatment session: \"Next visit will focus on advancements to more challenging activities and reduction in assistance provided\".   ·   Total Treatment Duration:  PT Patient Time In/Time Out  Time In: 1150  Time Out: 2001 Laith Perez, PTA

## 2017-01-14 NOTE — PROGRESS NOTES
Left message for family that patient being discharged to [facility] via Ambulance service.

## 2017-01-14 NOTE — PROGRESS NOTES
ORTH FRACTURE PROGRESS NOTE    2017  Admit Date:   2017    Post Op day: 2 Days Post-Op    Subjective:    Toshia Reynoso      PT/OT:   Gait:                    Vital Signs:    Patient Vitals for the past 8 hrs:   BP Temp Pulse Resp SpO2   17 0339 133/51 98.2 °F (36.8 °C) 76 17 91 %     Temp (24hrs), Av °F (36.7 °C), Min:97.6 °F (36.4 °C), Max:98.2 °F (36.8 °C)      Pain Control:   Pain Assessment  Pain Scale 1: Numeric (0 - 10)  Pain Intensity 1: 0  Pain Onset 1: postop  Pain Location 1: Leg  Pain Orientation 1: Left  Pain Description 1: Sore  Pain Intervention(s) 1: Ambulation/Increased Activity, Emotional support, Repositioned, Rest    Meds:    Current Facility-Administered Medications   Medication Dose Route Frequency    mupirocin (BACTROBAN) 2 % ointment   Topical BID    albumin human 25% (BUMINATE) solution 25 g  25 g IntraVENous Q12H    lactated ringers infusion  75 mL/hr IntraVENous ON CALL TO OR    amiodarone (CORDARONE) tablet 200 mg  200 mg Oral DAILY    aspirin (ASPIRIN) tablet 325 mg  325 mg Oral BID    digoxin (LANOXIN) tablet 0.125 mg  0.125 mg Oral DAILY    nitroglycerin (NITROSTAT) tablet 0.4 mg  0.4 mg SubLINGual Q5MIN PRN    traMADol (ULTRAM) tablet 50 mg  50 mg Oral Q6H PRN    traZODone (DESYREL) tablet 25 mg  25 mg Oral QHS    lactated ringers infusion  75 mL/hr IntraVENous CONTINUOUS    sodium chloride (NS) flush 5-10 mL  5-10 mL IntraVENous Q8H    sodium chloride (NS) flush 5-10 mL  5-10 mL IntraVENous PRN    acetaminophen (TYLENOL) tablet 650 mg  650 mg Oral Q8H    oxyCODONE IR (ROXICODONE) tablet 5 mg  5 mg Oral Q4H PRN    ondansetron (ZOFRAN) injection 4 mg  4 mg IntraVENous Q4H PRN    docusate sodium (COLACE) capsule 100 mg  100 mg Oral BID    alum-mag hydroxide-simeth (MYLANTA) oral suspension 30 mL  30 mL Oral Q4H PRN    calcium-vitamin D (OS-PARI) 500 mg-200 unit tablet  1 Tab Oral TID WITH MEALS    enoxaparin (LOVENOX) injection 30 mg  30 mg SubCUTAneous Q24H    influenza vaccine 2016-17 (36mos+)(PF) (FLUZONE/FLUARIX/FLULAVAL QUAD) injection 0.5 mL  0.5 mL IntraMUSCular PRIOR TO DISCHARGE    oxyCODONE IR (ROXICODONE) tablet 10 mg  10 mg Oral Q4H PRN       LAB:    Recent Labs      01/14/17   0505   HCT  33.6*   HGB  10.6*       24 Hour Assessment Issues:    Disoriented (baseline)    Discharge Planning: SNF    Transfuse PRBC's:      Assessment & Physician's Comment:  Neurovascular checks within normal limits    Active Problems:    Monteggia fracture, closed (1/12/2017)        Plan:    snf today  Krupa Patton MD

## 2017-01-15 NOTE — PROGRESS NOTES
Problem: Self Care Deficits Care Plan (Adult)  Goal: *Acute Goals and Plan of Care (Insert Text)  1. Rm Mckeon will complete upper body dressing with minimal assistance. 2. Rm Mckeon will complete self-feeding with supervision/setup. 3. Rm Mckeon will complete grooming/oral care with supervision/setup. 4. Rm Mckeon will complete toilet transfers with minimal assistance. Time frame: 4 - 7 visits      OCCUPATIONAL THERAPY: Treatment Day: 2nd and AM    1/15/2017  INPATIENT: Hospital Day: 4  [place] / [place] / Product Type: Medicare /      NAME/AGE/GENDER: Rm Mckeon is a 80 y.o. female           PRIMARY DIAGNOSIS:  Closed Monteggia's fracture of left ulna, initial encounter [S52.272A]  Mechanical complic of internal orthopedic device, implant or graft, initial encounter ([facility] [T84.498A] <principal problem not specified> <principal problem not specified>  Procedure(s) (LRB):  LEFT PROXIMAL ULNA OPEN REDUCTION INTERNAL FIXATION/LEFT RADIAL HEAD DISLOCATION (Left)  HARDWARE REMOVAL LEFT HIP WITH CONVERSION TO HIP HEMIARTHROPLASTY (Left)  3 Days Post-Op  LUE non-weightbearing; LLE WBAT  ICD-10: Treatment Diagnosis:        · Pain in Left Wrist (M25.532)  · Other lack of cordination (R27.8)  · History of falling (Z91.81)   Precautions/Allergies:         Pcn [penicillins]       ASSESSMENT:      Ms. Chau Gardner presents s/p fall, s/p Monteggia fracture, L hip fracture now L ulna/radius ORIF and L hip hemiarthroplasty. 1/15/2017 Pt was presented up in the chair upon arrival. Pt completed sit to stand with a gait belt, nima walker, and maximal assistance x's 2. Pt completed exercises from the chair. Pt required extensive time and cues to completed exercises due to delayed processing. Pt participated with good effort and will continue to benefit from OT to increase progression toward above goals.       This section established at most recent assessment   PROBLEM LIST (Impairments causing functional limitations):  1. Decreased Strength  2. Decreased ADL/Functional Activities  3. Decreased Transfer Abilities  4. Decreased Balance  5. Increased Pain  6. Decreased Activity Tolerance  7. Decreased Pacing Skills  8. Decreased Flexibility/Joint Mobility  9. Decreased [place] with Home Exercise Program  10. Decreased Cognition    INTERVENTIONS PLANNED: (Benefits and precautions of occupational therapy have been discussed with the patient.)  1. Activities of daily living training  2. Theraputic activity  3. Theraputic exercise      TREATMENT PLAN: Frequency/Duration: Follow patient 6 times/week to address above goals. Rehabilitation Potential For Stated Goals: GOOD      RECOMMENDED REHABILITATION/EQUIPMENT: (at time of discharge pending progress): Continue Skilled Therapy. OCCUPATIONAL PROFILE AND HISTORY:   History of Present Injury/Illness (Reason for Referral):  Per MD H & P:   Past Medical History/Comorbidities:   Ms. Alisha Burns  has a past medical history of AICD (automatic cardioverter/defibrillator) present; Anemia (10/25/2012); Aortic valve regurgitation; Arrhythmia; CAD (coronary artery disease) (9/4/2012); CHF (congestive heart failure) ([address] (9/4/2012); CHF (congestive heart failure) ([address] (9/4/2012); Chronic kidney disease, stage II (mild); Chronic systolic heart failure ([address] (4/16/2013); Diverticulitis; Encounter for long-term (current) use of other medications (11/19/2012); Esophagitis (2010); Falls; Femur fracture, left ([address] (12/02/2016); Fracture of left radius (12/02/2016); Fracture of wrist (2003); HLD (hyperlipidemia) (9/4/2012); HTN (hypertension) (9/4/2012); Insomnia; Ischemic cardiomyopathy, chronic; Left bundle branch block (LBBB); Osteoporosis (9/4/2012); S/P PTCA (percutaneous transluminal coronary angioplasty); Sigmoid stricture ([address] Stiffness in joint; and Varicose vein (2011).   Ms. Alisha Burns  has a past surgical history that includes orthopaedic; ptca (2012); cyst removal (2009); gyn; and implantable cardioverter defibrillator. Social History/Living Environment:   Home Environment: Skilled nursing facility ([facility])  [address] [place] [address] [place] Name: Penelope Jiang,[address]  One/Two Story Residence: One story  Living Alone: No  Support Systems: Child(nkechi)  Patient Expects to be Discharged to[de-identified] Skilled nursing facility  Current DME Used/Available at Home: [address] [facility] chair, Grab bars  Prior Level of Function/Work/Activity:  Lives alone. Drives. Manages her own medications. Cooks for herself. Completes all of her own activities of daily living/  Personal Factors:          Age:  80 y.o. Number of Personal Factors/Comorbidities that affect the Plan of Care: Expanded review of therapy/medical records (1-2):  MODERATE COMPLEXITY   ASSESSMENT OF OCCUPATIONAL PERFORMANCE[de-identified]   Activities of Daily Living:           Basic ADLs (From Assessment) Complex ADLs (From Assessment)   Basic ADL  Feeding: Moderate assistance  Oral Facial Hygiene/Grooming: Moderate assistance  Bathing: Total assistance  Upper Body Dressing: Total assistance  Lower Body Dressing: Total assistance  Toileting: Total assistance Instrumental ADL  Meal Preparation: Total assistance  Homemaking: Total assistance  Medication Management: Total assistance  Financial Management: Total assistance   Grooming/Bathing/Dressing Activities of Daily Living     Cognitive Retraining  Safety/Judgement: Fall prevention;Decreased insight into deficits; Decreased awareness of need for safety                       Bed/Mat Mobility  Supine to Sit: Moderate assistance  Sit to Stand: Maximum assistance  Bed to Chair: Moderate assistance;Assist x2  Scooting: Maximum assistance          Most Recent Physical Functioning:   Gross Assessment:                  Posture:  Posture (WDL): Exceptions to WDL  Posture Assessment:  Forward head, Rounded shoulders  Balance:  Sitting: Impaired  Sitting - Static: Good (unsupported)  Sitting - Dynamic: Fair (occasional)  Standing: Impaired  Standing - Static: Poor  Standing - Dynamic : Poor Bed Mobility:  Supine to Sit: Moderate assistance  Scooting: Maximum assistance  Interventions: Safety awareness training;Verbal cues  Wheelchair Mobility:     Transfers:  Sit to Stand: Maximum assistance  Stand to Sit: Maximum assistance  Bed to Chair: Moderate assistance;Assist x2  Interventions: Safety awareness training;Verbal cues                 Patient Vitals for the past 6 hrs:   BP BP Patient Position SpO2 Pulse   01/15/17 0729 126/48 At rest 93 % 73   01/15/17 1011 109/42 At rest 95 % 74   01/15/17 1025 116/47 At rest 94 % 73   01/15/17 1114 (!) 109/38 Sitting - 70        Mental Status  Neurologic State: Alert  Orientation Level: Disoriented to place, Disoriented to situation, Disoriented to time, Oriented to person  Cognition: Follows commands  Perception: Appears intact  Perseveration: No perseveration noted  Safety/Judgement: Fall prevention, Decreased insight into deficits, Decreased awareness of need for safety                               Physical Skills Involved:  1. Range of Motion  2. Balance  3. Mobility  4. Strength  5. Fine or Gross Motor Coordination  6. Dexterity Cognitive Skills Affected (resulting in the inability to perform in a timely and safe manner):  1. Problem Solving Psychosocial Skills Affected:  1. Habits  2. Routines and Behaviors  3. Active Use of Coping Strategies  4. Environmental Adaptations   Number of elements that affect the Plan of Care: 3-5:  MODERATE COMPLEXITY   CLINICAL DECISION MAKING:   [facility] 6 Clicks   Basic Mobility Inpatient Short Form  How much help from another person does the patient currently need. .. Total A Lot A Little None   1. Putting on and taking off regular lower body clothing? [X] 1   [ ] 2   [ ] 3   [ ] 4   2. Bathing (including washing, rinsing, drying)? [X] 1   [ ] 2   [ ] 3   [ ] 4   3.   Toileting, which includes using toilet, bedpan or urinal?   [X] 1   [ ] 2   [ ] 3   [ ] 4   4. Putting on and taking off regular upper body clothing? [X] 1   [ ] 2   [ ] 3   [ ] 4   5. Taking care of personal grooming such as brushing teeth? [ ] 1   [X] 2   [ ] 3   [ ] 4   6. Eating meals? [ ] 1   [X] 2   [ ] 3   [ ] 4   © 2007, Trustees of [address] [place] [address] [address] [id number], under license to [employer]. All rights reserved    Score:  Initial: 8 Most Recent: X (Date: -- )     Interpretation of Tool:  Represents activities that are increasingly more difficult (i.e. Bed mobility, Transfers, Gait). Score 24 23 22-20 19-15 14-10 9-7 6       Modifier CH CI CJ CK CL CM CN         · Self Care:               - CURRENT STATUS:    CM - 80%-99% impaired, limited or restricted               - GOAL STATUS:           CK - 40%-59% impaired, limited or restricted               - D/C STATUS:                       ---------------To be determined---------------  [place] / [place] / Product Type: Medicare /       Medical Necessity:     · Patient demonstrates good rehab potential due to higher previous functional level. Reason for Services/Other Comments:  · Patient continues to require skilled intervention due to decreased independence with ADL, instrumental ADL, and functional mobility for ADL. Use of outcome tool(s) and clinical judgement create a POC that gives a: MODERATE COMPLEXITY             TREATMENT:   (In addition to Assessment/Re-Assessment sessions the following treatments were rendered)      Pre-treatment Symptoms/Complaints:    Pain: Initial:      Post Session:  Same as pre. Therapeutic Activity: (8 minutes): Therapeutic activities including static standing to improve mobility, strength and balance. Required maximal assistance x's 2 to promote static balance in standing. Therapeutic Exercise: ( 15 minutes):  Exercises per grid below to improve mobility and strength.   Required moderate visual, verbal and manual cues to promote proper body mechanics. Progressed range as indicated. Date:  1/15/17 Date:   Date:     Activity/Exercise Parameters Parameters Parameters   Ankle pumps 15 reps     Knee flexion/extension 15 reps     Hip add/abd 15 reps                                              Treatment/Session Assessment:         Response to Treatment:  Tolerates little active movement. Able to extend digits and lift at shoulder, but need to continue to work on to increase independence with activities of daily living . Interdisciplinary Collaboration:   · Occupational Therapist  · Registered Nurse     After treatment position/precautions:   · Up in chair  · Bed/Chair-wheels locked  · Call light within reach  · RN notified  · PTA/rehab tech at bedside     Compliance with Program/Exercises: Will assess as treatment progresses. Recommendations/Intent for next treatment session:   \"Next visit will focus on advancements to more challenging activities\"       Total Treatment Duration:  OT Patient Time In/Time Out  Time In: 1040  Time Out: [place] [address] [facility]

## 2017-01-15 NOTE — PROGRESS NOTES
ORTH FRACTURE PROGRESS NOTE    January 15, 2017  Admit Date:   2017    Post Op day: 3 Days Post-Op    Subjective:    Erum Salguero     PT/OT:   Gait:                    Vital Signs:    Patient Vitals for the past 8 hrs:   BP Temp Pulse Resp SpO2   01/15/17 0729 126/48 97.8 °F (36.6 °C) 73 16 93 %   01/15/17 0531 119/47 97.5 °F (36.4 °C) 77 16 93 %     Temp (24hrs), Av.8 °F (36.6 °C), Min:97.2 °F (36.2 °C), Max:98.4 °F (36.9 °C)      Pain Control:   Pain Assessment  Pain Scale 1: Numeric (0 - 10)  Pain Intensity 1: 0  Pain Onset 1: postop  Pain Location 1: Leg  Pain Orientation 1: Left  Pain Description 1: Sore  Pain Intervention(s) 1: Ambulation/Increased Activity, Emotional support, Repositioned, Rest    Meds:    Current Facility-Administered Medications   Medication Dose Route Frequency    0.9% sodium chloride infusion 250 mL  250 mL IntraVENous PRN    levoFLOXacin (LEVAQUIN) 750 mg in D5W IVPB  750 mg IntraVENous Q48H    albumin human 25% (BUMINATE) solution 12.5 g  12.5 g IntraVENous Q6H    mupirocin (BACTROBAN) 2 % ointment   Topical BID    lactated ringers infusion  75 mL/hr IntraVENous ON CALL TO OR    amiodarone (CORDARONE) tablet 200 mg  200 mg Oral DAILY    aspirin (ASPIRIN) tablet 325 mg  325 mg Oral BID    digoxin (LANOXIN) tablet 0.125 mg  0.125 mg Oral DAILY    nitroglycerin (NITROSTAT) tablet 0.4 mg  0.4 mg SubLINGual Q5MIN PRN    traMADol (ULTRAM) tablet 50 mg  50 mg Oral Q6H PRN    traZODone (DESYREL) tablet 25 mg  25 mg Oral QHS    lactated ringers infusion  75 mL/hr IntraVENous CONTINUOUS    sodium chloride (NS) flush 5-10 mL  5-10 mL IntraVENous Q8H    sodium chloride (NS) flush 5-10 mL  5-10 mL IntraVENous PRN    acetaminophen (TYLENOL) tablet 650 mg  650 mg Oral Q8H    oxyCODONE IR (ROXICODONE) tablet 5 mg  5 mg Oral Q4H PRN    ondansetron (ZOFRAN) injection 4 mg  4 mg IntraVENous Q4H PRN    docusate sodium (COLACE) capsule 100 mg  100 mg Oral BID    alum-mag hydroxide-simeth (MYLANTA) oral suspension 30 mL  30 mL Oral Q4H PRN    calcium-vitamin D (OS-PARI) 500 mg-200 unit tablet  1 Tab Oral TID WITH MEALS    enoxaparin (LOVENOX) injection 30 mg  30 mg SubCUTAneous Q24H    influenza vaccine 2016-17 (36mos+)(PF) (FLUZONE/FLUARIX/FLULAVAL QUAD) injection 0.5 mL  0.5 mL IntraMUSCular PRIOR TO DISCHARGE    oxyCODONE IR (ROXICODONE) tablet 10 mg  10 mg Oral Q4H PRN       LAB:    Recent Labs      01/15/17   0458   HCT  25.8*   HGB  8.4*       24 Hour Assessment Issues:    Oriented    Discharge Planning: SNF    Transfuse PRBC's:      Assessment & Physician's Comment:  Neurovascular checks within normal limits    Active Problems:    Monteggia fracture, closed (1/12/2017)        Plan:    Cont PT as tolerated  Karina Garcia MD

## 2017-01-15 NOTE — PROGRESS NOTES
PT note:  Returned for afternoon treatment. RN Sherren Perry) reported that she had returned pt to bed approximately one hour ago and that pt will is preparing for discharge this afternoon to [facility]. Pt currently on the bedpan. Will defer treatment at this time.  Stepan Louis, PT

## 2017-01-15 NOTE — CONSULTS
[address]       Name:  Clifford Del Rosario   MR#:  [id number]   :  07/15/1926   Account #:  [de-identified]   Date of Adm:  2017       REASON FOR CONSULTATION: Cardiac history and hypotension. HISTORY OF PRESENT ILLNESS: This is a 54-year-old female with a   past medical history of predominant nonischemic cardiomyopathy   with an ejection fraction of 15% to 20%, status post   biventricular ICD placement, who presented for an elective   surgery for a left proximal ulnar fracture with an open   reduction, internal fixation as well as left hip   hemiarthroplasty and is currently postop day #1. She initially   fell on the  and had initial left hip repair and   currently underwent a redo with the hemiarthroplasty. Postoperatively, the patient was reportedly somewhat lethargic   and was noted to have some lower blood pressures, reportedly   mainly diastolic. Earlier today she had some diastolic lows and   was reportedly a little lethargic and hence Cardiology was   requested to evaluate the patient. Currently mostly had issues with   incisional discomfort. Denies any other issues. Denies any   shortness of breath. She is currently noted to be on 1 liter   through her nasal cannula. No other complaints at this time. PAST MEDICAL HISTORY   1. Severe left ventricular systolic dysfunction with last   ejection fraction of 15% to 20%, status post biventricular ICD   in . 2. History of proximal LAD stent in ; however, per records,   her cardiomyopathy is predominantly nonischemic. 3. Hypertension. 4. Hyperlipidemia. 5. Valvular heart disease noted with moderate aortic   stenosis/moderate aortic insufficiency by last echocardiogram in   . HOME MEDICATIONS   1. Amiodarone 200 mg daily. 2.  Aspirin 325 mg daily. 3.  Coreg 3.125 mg b.i.d.   4.  Digoxin 0.125 mg daily. 5.  Lasix 40 mg daily.    6.  Hydrocodone/acetaminophen 7.5/325 mg every 6 hours as   needed. 7.  Lisinopril 2.5 mg daily. 8.  Melatonin at bedtime. 9.  Nitroglycerin sublingual as needed. 10. Tramadol 50 mg every 6 hours as needed. 11. Trazodone 50 mg half tablet at bedtime. ALLERGIES: LISTED TO PENICILLIN. SOCIAL HISTORY: Negative for any tobacco, alcohol or drug use. FAMILY HISTORY: Negative for any premature coronary disease,   sudden death, cardiac death, or cardiomyopathy. REVIEW OF SYSTEMS: The patient currently denies any fevers or   chills. No visual changes or any hearing abnormalities. No   significant upper respiratory symptoms, mostly issues with left   hip pain around the incisional site, some discomfort postop. Rest per HPI. All other systems reviewed and are negative. PHYSICAL EXAMINATION   VITAL SIGNS: Temperature 98.1, pulse rate 70, blood pressure   95/35, respiratory rate 16, sats >90 % on 1 liter nasal   cannula. GENERAL: Alert and oriented, in no acute distress. NECK: Supple with no lymphadenopathy, no JVD appreciated. HENT: Atraumatic, normocephalic. Mucous membranes moist.   Oropharynx clear. CARDIOVASCULAR: S1, S2. Regular rate, rhythm. A 2/6 systolic   ejection murmur heard at the right upper sternal border. LUNGS: Clear to auscultation anteriorly, minimal basilar rales   predominant in the left base. ABDOMEN: Soft, nondistended. Bowel sounds present. EXTREMITIES: No clubbing, cyanosis, or edema. NEUROLOGICAL: No focal cranial nerve deficits. VASCULAR: Pulses 2+ and symmetrical in his dorsalis pedis and   radial artery. LABORATORY DATA: WBC is 13.1, hemoglobin 10.6, hematocrit 32.6,   platelets 796. Sodium 142, potassium 4.9, chloride 107, CO2 of   25, BUN 30, creatinine 1.3. Chest x-ray reviewed, which shows no   evidence of any heart failure. There is some actually left   basilar atelectasis/filtrate. IMPRESSION   1. Status post left hip hemiarthroplasty and left ulnar fixation   postop day #1.    2. Hypotension appears mostly asymptomatic. 3. History of severe left ventricular dysfunction, status post   biventricular implantable cardioverter defibrillator. 4. History of coronary artery disease. 5. Hypertension. 6. Hyperlipidemia. RECOMMENDATIONS: The patient is currently postoperative day #1. She does appear hemodynamically stable with her exam, currently   euvolemic. Does have some borderline hypotension, but appears   asymptomatic. She is currently receiving gentle hydration and   agree with continuing the same, closely monitor her volume   status, would anticipate her blood pressures to improve. Continue to hold her left ventricular dysfunction medications at   this time and the medications will be re-added based on how   blood pressures tolerate. There is also some concern for   possible pneumonia and she has been started on Levaquin by the   primary team. Further recommendations pending her clinical course.         MD FINA Granados / TB   D:  01/14/2017   17:23   T:  01/14/2017   21:18   Job #:  [id number]

## 2017-01-15 NOTE — PROGRESS NOTES
Problem: Mobility Impaired (Adult and Pediatric)  Goal: *Acute Goals and Plan of Care (Insert Text)  STG:  (1.)Ms. Johnny Quiñones will perform bed mobility with MODERATE ASSIST x1 within 3 day(s). (2.)Ms. Johnny Quiñones will demonstrate good dynamic sitting balance within 3 day(s). (3.)Ms. Johnny Quiñones will transfer from bed to chair and chair to bed with MODERATE ASSIST x1 using the least restrictive device within 3 day(s). (4.)Ms. Johnny Quiñones will tolerate 15+ minutes of therapeutic activity/exercise while maintaining stable vitals within 3 day(s). LTG:  (1.)Ms. Johnny Quiñones will perform bed mobility with MINIMAL ASSISTANCE within 7 day(s). (2.)Ms. Johnny Quiñones will transfer from bed to chair and chair to bed with MINIMAL ASSISTANCE x1 using the least restrictive device within 7 day(s). (3.)Ms. Johnny Quiñones will ambulate 10+ft with MINIMAL ASSISTANCE x1 utilizing least restrictive assistive device within 7 day(s). (4.)Ms. Johnny Quiñones will tolerate 25+ minutes of therapeutic activity/exercise while maintaining stable vitals within 7 day(s).    ________________________________________________________________________________________________      PHYSICAL THERAPY: Daily Note, Treatment Day: 2nd and AM    1/15/2017  INPATIENT: Hospital Day: 4  [place] / [place] / Product Type: Medicare /       NWB L UE; Sling  WBAT L LE     NAME/AGE/GENDER: Kaylen Vogel is a 80 y.o. female           PRIMARY DIAGNOSIS: Closed Monteggia's fracture of left ulna, initial encounter [G16.130A]  Mechanical complic of internal orthopedic device, implant or graft, initial encounter ([facility] [H97.397Z] <principal problem not specified> <principal problem not specified>  Procedure(s) (LRB):  LEFT PROXIMAL ULNA OPEN REDUCTION INTERNAL FIXATION/LEFT RADIAL HEAD DISLOCATION (Left)  HARDWARE REMOVAL LEFT HIP WITH CONVERSION TO HIP HEMIARTHROPLASTY (Left)  3 Days Post-Op  ICD-10: Treatment Diagnosis:       · Generalized Muscle Weakness (M62.81)  · Difficulty in walking, Not elsewhere classified (R26.2)   Precaution/Allergies:  Pcn [penicillins]       ASSESSMENT:      Ms. Ciarra Kapoor presents is a 80year old female 2 day s/p left proximal ulnar ORIF and left hip hemiarthroplasty. Patient is NWB on L UE and WBAT L LE with hip precautions. Patient seen this AM for  physical therapy. Presents supine in bed. Appears mildly confused - oriented to person only - but follows commands wel. Patient has been in [address] since previous hip surgery and primary utilizing a WC and progressing with transfers; prior to initial hip injury in November 2016, patient was independent with ADLs and ambulation. Today, had sling on L UE. Knee immobilizer was removed for mobility and replaced once in chair. Sat to EOB with mod assist, more alert and cooperative than yesterday. Sit to stand and transfer to the right with mod assist x 2 and VC's for pt to stand erect. Patient able to bear weight through B LEs to assist with transfer and sat in chair but did so with extreme forward trunk flexion. Ms. Ciarra Kapoor presents with decreased functional mobility and balance/gait status from baseline. Recommend continued skilled PT services to address stated deficits. Will follow and progress toward stated goals during acute stay. Will attempt hemiwalker at next treatment to see if that helps achieve a more erect stance. Pt requiring less assistance for mobility today compared to previous treatment. This section established at most recent assessment   PROBLEM LIST (Impairments causing functional limitations):  1. Decreased Strength  2. Decreased ADL/Functional Activities  3. Decreased Transfer Abilities  4. Decreased Ambulation Ability/Technique  5. Decreased Balance  6. Increased Pain  7. Decreased Activity Tolerance  8. Decreased Flexibility/Joint Mobility  9. Decreased Knowledge of Precautions  10. Decreased [place] with Home Exercise Program  11.  Decreased Cognition INTERVENTIONS PLANNED: (Benefits and precautions of physical therapy have been discussed with the patient.)  1. Balance Exercise  2. Bed Mobility  3. Family Education  4. Gait Training  5. Home Exercise Program (HEP)  6. Manual Therapy  7. Range of Motion (ROM)  8. Therapeutic Activites  9. Therapeutic Exercise/Strengthening  10. Transfer Training  11. Group Therapy      TREATMENT PLAN: Frequency/Duration: twice daily for duration of hospital stay  Rehabilitation Potential For Stated Goals: GOOD      RECOMMENDED REHABILITATION/EQUIPMENT: (at time of discharge pending progress): Continue Skilled Therapy. HISTORY:   History of Present Injury/Illness (Reason for Referral):  Difficulty in Walking; S/p above procedures  Past Medical History/Comorbidities:   Ms. Delia Cool  has a past medical history of AICD (automatic cardioverter/defibrillator) present; Anemia (10/25/2012); Aortic valve regurgitation; Arrhythmia; CAD (coronary artery disease) (9/4/2012); CHF (congestive heart failure) ([address] (9/4/2012); CHF (congestive heart failure) ([address] (9/4/2012); Chronic kidney disease, stage II (mild); Chronic systolic heart failure ([address] (4/16/2013); Diverticulitis; Encounter for long-term (current) use of other medications (11/19/2012); Esophagitis (2010); Falls; Femur fracture, left ([address] (12/02/2016); Fracture of left radius (12/02/2016); Fracture of wrist (2003); HLD (hyperlipidemia) (9/4/2012); HTN (hypertension) (9/4/2012); Insomnia; Ischemic cardiomyopathy, chronic; Left bundle branch block (LBBB); Osteoporosis (9/4/2012); S/P PTCA (percutaneous transluminal coronary angioplasty); Sigmoid stricture ([address] Stiffness in joint; and Varicose vein (2011). Ms. Delia Cool  has a past surgical history that includes orthopaedic; ptca (2012); cyst removal (2009); gyn; and implantable cardioverter defibrillator.   Social History/Living Environment:   Home Environment: Skilled nursing facility ([facility])  [address] [place] [address] Name: [facility]  One/Two Story Residence: One story  Living Alone: No  Support Systems: Child(nkechi)  Patient Expects to be Discharged to[de-identified] Skilled nursing facility  Current DME Used/Available at Home: Shower chair, Grab bars  Prior Level of Function/Work/Activity:  STR prior to this admission. S/p previous hip surgery. Primarily utilizing a WC for mobility and progressing toward transfers. Prior to 2016, patient was independent with functional mobility and ADLs. Number of Personal Factors/Comorbidities that affect the Plan of Care: 3+: HIGH COMPLEXITY   EXAMINATION:   Most Recent Physical Functioning:   Gross Assessment:                  Posture:     Balance:  Sitting: Impaired  Sitting - Static: Good (unsupported)  Sitting - Dynamic: Fair (occasional)  Standing: Impaired  Standing - Static: Poor  Standing - Dynamic : Poor Bed Mobility:  Supine to Sit: Moderate assistance  Scooting: Maximum assistance  Interventions: Safety awareness training;Verbal cues  Wheelchair Mobility:     Transfers:  Sit to Stand: Maximum assistance  Stand to Sit: Maximum assistance  Bed to Chair: Moderate assistance;Assist x2  Interventions: Safety awareness training;Verbal cues  Gait:  Left Side Weight Bearing:  (WBAT L LE, NWB L UE)          Body Structures Involved:  1. Bones  2. Joints  3. Muscles  4. Ligaments Body Functions Affected:  1. Neuromusculoskeletal  2. Movement Related Activities and Participation Affected:  1. General Tasks and Demands  2. Mobility  3. Self Care  4. Domestic Life   Number of elements that affect the Plan of Care: 4+: HIGH COMPLEXITY   CLINICAL PRESENTATION:   Presentation: Evolving clinical presentation with changing clinical characteristics: MODERATE COMPLEXITY   CLINICAL DECISION MAKIN [facility] Short Form  How much difficulty does the patient currently have. .. Unable A Lot A Little None   1.   Turning over in bed (including adjusting bedclothes, sheets and blankets)? [ ] 1   [X] 2   [ ] 3   [ ] 4   2. Sitting down on and standing up from a chair with arms ( e.g., wheelchair, bedside commode, etc.)   [ ] 1   [X] 2   [ ] 3   [ ] 4   3. Moving from lying on back to sitting on the side of the bed? [ ] 1   [X] 2   [ ] 3   [ ] 4   How much help from another person does the patient currently need. .. Total A Lot A Little None   4. Moving to and from a bed to a chair (including a wheelchair)? [ ] 1   [X] 2   [ ] 3   [ ] 4   5. Need to walk in hospital room? [ ] 1   [X] 2   [ ] 3   [ ] 4   6. Climbing 3-5 steps with a railing? [ ] 1   [X] 2   [ ] 3   [ ] 4   © 2007, Trustees of [address] [place] [id number], under license to [employer]. All rights reserved    Score:  Initial: 12 Most Recent: 12 (Date: 1/13/17 )     Interpretation of Tool:  Represents activities that are increasingly more difficult (i.e. Bed mobility, Transfers, Gait). Score 24 23 22-20 19-15 14-10 9-7 6       Modifier CH CI CJ CK CL CM CN         · Mobility - Walking and Moving Around:               - CURRENT STATUS:    CL - 60%-79% impaired, limited or restricted               - GOAL STATUS:           CJ - 20%-39% impaired, limited or restricted               - D/C STATUS:                       ---------------To be determined---------------  [place] / [place] / Product Type: Medicare /       Medical Necessity:     · Patient demonstrates good rehab potential due to higher previous functional level. Reason for Services/Other Comments:  · Patient continues to require skilled intervention due to decreased functional mobility and balance/gait status from baseline. .   Use of outcome tool(s) and clinical judgement create a POC that gives a: Questionable prediction of patient's progress: MODERATE COMPLEXITY                 TREATMENT:   (In addition to Assessment/Re-Assessment sessions the following treatments were rendered) Pre-treatment Symptoms/Complaints:    Pain: Initial:   Pain Intensity 1: 0  Post Session:  Unable to rate but grimacing and moaning with movement. Therapeutic Activity: (    15 Minutes): Therapeutic activities including bed mobility, transfer training, balance training, safety awareness training, and patient education to improve mobility, strength, balance and coordination. Required minimal verbal cues  to promote static and dynamic balance in standing and promote coordination of left, upper extremity(s), lower extremity(s). Treatment/Session Assessment:    · Interdisciplinary Collaboration:  · Physical Therapist and Registered Nurse  · After treatment position/precautions:  · Up in chair, Bed/Chair-wheels locked, Bed in low position, Call light within reach, RN notified and posey alarm on but missing cord - RN Amy Franco) notified that cord was missing  · Compliance with Program/Exercises: Will assess as treatment progresses. · Recommendations/Intent for next treatment session: \"Next visit will focus on advancements to more challenging activities and reduction in assistance provided\".   ·   Total Treatment Duration:  PT Patient Time In/Time Out  Time In: 8606  Time Out: [address] [facility],

## 2017-01-15 NOTE — PROGRESS NOTES
TRANSFER - OUT REPORT:    Verbal report given to Stephanie Wisdom RN on Carlos A Nair  being transferred to [facility] for routine progression of care       Report consisted of patients Situation, Background, Assessment and   Recommendations(SBAR). Information from the following report(s) SBAR, Kardex and OR Summary was reviewed with the receiving nurse. Lines:       Opportunity for questions and clarification was provided. Patient transported with: all personal belongings.

## 2017-01-15 NOTE — DISCHARGE SUMMARY
Hospitalist Discharge Summary     Patient ID:  Maggy Richardson  [id number]  5 y.o.  7/15/1926  Admit date: 1/12/2017 10:38 AM  Discharge date and time: 1/15/2017  Attending: Marlon Norman MD  PCP:  Teja Casper MD  Treatment Team: Attending Provider: Marlon Norman MD; Utilization Review: Isael Cotton; Consulting Provider: Moris Moon MD; Consulting Provider: Perry Stock MD    Principal Diagnosis <principal problem not specified>   Active Problems:    Monteggia fracture, closed (1/12/2017)             Hospital Course:  Please refer to the admission H&P for details of presentation. In summary, 81 YO female patient with an extensive PMH of systolic CHF with EF 15 - 20 % s/p ICD placement, aortic regurgitation, ischemic cardiopathy who underwent a left proximal ulna ORIF and hardware removal of left hip with conversion to hip hemiarthroplasty. Pt noted to be borderline hypotensive on 1/14 w/ new oxygen requirement. CXR showing LLL infiltrate and leukocytosis. She was started on Levaquin with clinical improvement, downtrending WBC, remains afebrile. One episode of blood tinged sputum prior to discharge - will need to see pulm as outpatient if this worsens. Continue supplemental oxygen prn and consider repeat CXR in 3-4 weeks. Her BP has improved and she should be able to resume home medications at [facility]. She did require 1 unit prbc for acute blood loss anemia r/t surgery. Significant Diagnostic Studies:   Final result (Exam End: 1/13/2017  6:49 PM) Open        Study Result      PORTABLE CHEST, January 13, 2017 at 1849 hours     CLINICAL HISTORY: Congestive heart failure receiving IV fluids.     COMPARISON: April 17, 2013.     FINDINGS: AP semi-erect image demonstrates no confluent infiltrate or  significant pleural fluid. There is patchy atelectasis/infiltrate laterally at  the left base. The heart is mildly enlarged without evidence of congestive  heart failure or pneumothorax.  The bony thorax appears intact on this view.     IMPRESSION  IMPRESSION:      1. CARDIOMEGALY WITHOUT EVIDENCE OF SANDRA CONGESTIVE HEART FAILURE OR FLUID  IMBALANCE.     2. PATCHY LEFT BASILAR ATELECTASIS/INFILTRATE.             Labs: Results:       Chemistry Recent Labs      01/14/17   0505   GLU  97   NA  142   K  4.9   CL  107   CO2  25   BUN  30*   CREA  1.29*   CA  9.2   AGAP  10      CBC w/Diff Recent Labs      01/15/17   0458  01/14/17   0505  01/13/17   0631   WBC  7.2  13.1*  10.2   RBC  2.63*  3.37*  3.31*   HGB  8.4*  10.6*  10.5*   HCT  25.8*  33.6*  32.7*   PLT  130*  145*  170   GRANS  84*  91*  90*   LYMPH  11*  8*  6*   EOS  1  0*  1      Cardiac Enzymes No results for input(s): CPK, CKND1, MARGUERITE in the last 72 hours. No lab exists for component: CKRMB, TROIP   Coagulation No results for input(s): PTP, INR, APTT in the last 72 hours. No lab exists for component: INREXT    Lipid Panel No results found for: CHOL, CHOLPOCT, CHOLX, CHLST, CHOLV, [id number], HDL, LDL, NLDLCT, DLDL, LDLC, DLDLP, [id number], VLDLC, VLDL, TGL, TGLX, TRIGL, [id number], TRIGP, TGLPOCT, 512434, CHHD, CHHDX   BNP Recent Labs      01/14/17   0505   BNPP  2047      Liver Enzymes No results for input(s): TP, ALB, TBIL, AP, SGOT, GPT in the last 72 hours. No lab exists for component: DBIL   Thyroid Studies Lab Results   Component Value Date/Time    T4, Total 8.5 03/22/2016 10:31 AM    TSH 1.400 03/22/2016 10:31 AM            Discharge Exam:  Visit Vitals    BP (!) 109/38 (BP 1 Location: Right arm, BP Patient Position: Sitting)    Pulse 70    Temp 97.8 °F (36.6 °C)    Resp 18    Ht 5' 2\" (1.575 m)    Wt 55.8 kg (123 lb)    SpO2 94%    BMI 22.5 kg/m2     General appearance: alert, cooperative, no distress, appears stated age  Lungs: left basilar rales  Heart: regular rate and rhythm, S1, S2 normal, no murmur, click, rub or gallop  Abdomen: soft, non-tender.  Bowel sounds normal. No masses,  no organomegaly  Extremities: no cyanosis or edema  Neurologic: Grossly normal    Disposition: home  Discharge Condition: stable  Patient Instructions:   Current Discharge Medication List      START taking these medications    Details   levoFLOXacin (LEVAQUIN) 750 mg tablet Take 1 Tab by mouth every fourty-eight (48) hours for 8 days. Qty: 4 Tab, Refills: 0      calcium-vitamin D (OYSTER SHELL) 500 mg(1,250mg) -200 unit per tablet Take 1 Tab by mouth three (3) times daily (with meals). Qty: 90 Tab, Refills: 0      docusate sodium (COLACE) 100 mg capsule Take 1 Cap by mouth two (2) times a day for 90 days. Qty: 60 Cap, Refills: 2      enoxaparin (LOVENOX) 30 mg/0.3 mL injection 0.3 mL by SubCUTAneous route every twenty-four (24) hours for 28 days. Qty: 28 Syringe, Refills: 0      oxyCODONE IR (ROXICODONE) 10 mg tab immediate release tablet Take 0.5 Tabs by mouth every four (4) hours as needed. Max Daily Amount: 30 mg.  Qty: 40 Tab, Refills: 0         CONTINUE these medications which have CHANGED    Details   acetaminophen (TYLENOL) 325 mg tablet Take 2 Tabs by mouth every eight (8) hours. Qty: 90 Tab, Refills: 0         CONTINUE these medications which have NOT CHANGED    Details   carvedilol (COREG) 3.125 mg tablet Take  by mouth two (2) times daily (with meals). traMADol (ULTRAM) 50 mg tablet Take 50 mg by mouth every six (6) hours as needed for Pain. aspirin (ASPIRIN) 325 mg tablet Take 325 mg by mouth two (2) times a day. furosemide (LASIX) 40 mg tablet Take 40 mg by mouth every morning. amiodarone (CORDARONE) 200 mg tablet Take 200 mg by mouth every morning. digoxin (LANOXIN) 0.125 mg tablet Take  by mouth every morning. lisinopril (PRINIVIL, ZESTRIL) 2.5 mg tablet Take 1 Tab by mouth daily. Qty: 30 Tab, Refills: 11      melatonin 1 mg tablet Take 1 mg by mouth nightly. traZODone (DESYREL) 50 mg tablet Take  by mouth nightly.  1/2 pill hs      nitroglycerin (NITROSTAT) 0.4 mg SL tablet 1 Tab by SubLINGual route every five (5) minutes as needed for Chest Pain.   Qty: 25 Tab, Refills: 11         STOP taking these medications       HYDROcodone-acetaminophen (NORCO) 7.5-325 mg per tablet Comments:   Reason for Stopping:               Activity: as tolerated  Diet: regular      Follow-up  · PCP in 1-2 weeks, Ortho 1 week    Time spent to discharge patient 35 minutes  Signed:  Jennifer Ron DO  1/15/2017  1:29 PM

## 2017-01-20 NOTE — OP NOTES
[address] [facility]   OPERATIVE REPORT       Name:  Manda Escalona   MR#:  [id number]   :  07/15/1926   Account #:  [de-identified]   Date of Adm:  2017       DATE OF SURGERY: 2017    PREOPERATIVE DIAGNOSES:    1. Left femoral neck fracture nonunion. 2. Retained hardware, left femoral neck. 3. Left proximal ulna elbow fracture with posterior radial head   dislocation, i.e. Montaggia fracture. POSTOPERATIVE DIAGNOSES:   1. Left femoral neck fracture nonunion. 2. Retained hardware, left femoral neck. 3. Left proximal ulna elbow fracture with posterior radial head   dislocation, i.e. Montaggia fracture. PROCEDURES:   1. Conversion of previous left hip surgery to left hip   hemiarthroplasty. 2. Removal of deep hardware, left hip. 3. ORIF of left Monteggia fracture with ORIF of the ulnar   fracture and closed reduction of the radial head dislocation. OPERATING TEAM: [employer]. Naveed Marcano MD.    ANESTHESIA: General.    PROCEDURE: The patient was brought to the operative suite,   placed in supine position. After adequate anesthesia was   achieved in form of general, the patient was placed into a   lateral decubitus position with the left side up, held in place   on the pegboard. Down leg was well-padded and axillary roll was   placed. Left hip was prepped and draped in the usual sterile   fashion. Incision was made over the lateral aspect of the proximal femur,   curving posteriorly at the tip of the trochanter. Hemostasis was   achieved with Bovie cautery. Fascia incised along the line of   the skin incision. The gluteus was split along its length. 3   underlying screws were identified and they were removed without   difficulty. The capsule and short external rotators were then   taken down off the posterior aspect of the femur. This revealed   the underlying fracture. 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    The neck was cut with the template 5 mm proximal to the lesser trochanter. The femoral head was then removed with the   corkscrew. The acetabulum was in good condition. The head was   sized with the sizer and then the appropriate length size head   was chosen based upon suction fit in the acetabulum. Using the   YR.MRKTuy system, the Corail, the canal finder was used to identify   the canal. Sequential reamings and broachings were performed   until the appropriate stable fit was achieved. We then trialed   with the appropriate size bipolar head and neck , changing in   the size of the neck until retention in the quadriceps felt   right. With the proper neck length, the hip was taken through a   range of motion 90 fegrees of flexion, 30 degrees of internal   rotation and 30 degrees of abduction with no dislocation. The   hip was then dislocated using the bone hook. The trials were   removed. The hip was irrigated with 3 liters of normal saline   through the pulse lavage. The appropriate size Corail stem was   then tapped into place with excellent position and purchase. The   bipolar head was then tapped onto the neck. The hip was then   reduced, taken through range of motion with previously mentioned   results. We then repaired the capsule and short external   rotators to the greater trochanter through drill holes. The   remainder wound was closed in layers. Sterile compressive   dressing was applied. The patient was then converted to a supine position, knee   immobilizer was applied to the left knee. The left upper   extremity was then prepped and draped in the usual sterile   fashion. Sterile tourniquet was applied and preset to a level of   250 mmHg. The arm was elevated and exsanguinated, and the   tourniquet was inflated. Incision was made over the subcutaneous border of the ulna. Hemostasis achieved with Bovie cautery. Fascia incised along the   line of the skin incision, the incision curved radially over the   tip of the olecranon then back to the midline.  The underlying   fracture was a Monteggia type fracture. There was some evidence   of healing. The fracture was taken down with a pituitary rongeur   and  Yamileth Mary in order to allow it to be mobile. It was then   secured with a Synthes proximal ulnar plate. The plate was   secured proximally with 2.7 mm screws. It was then pulled   distally to reduce the fracture and secured distally, initially   with a 3.5 cortical screw in compression mode and then 3.5   locking screws. At this point, under direct palpation the radial   head was reduced as well. AP and lateral fluoroscopic images   confirmed that the Monteggia fracture was reduced anatomically,   the radial head was reduced, the hardware was in good position. The wound was then irrigated with normal saline and closed in   layers. A sterile compressive dressing and posterior splint were   applied after the tourniquet was deflated. The patient was then transferred to the recovery room alert and   oriented under the care of Anesthesia. ESTIMATED BLOOD LOSS: Blood loss to the hip was 200 mL and blood   loss to the upper extremities Montaggia fracture was minimal.    FLUIDS: See anesthesia record. CLOSURE: Primary. COMPLICATIONS: None. MD Maryam Summers / [place]   D:  01/19/2017   21:24   T:  01/20/2017   07:52   Job #:  [id number]

## 2017-05-24 PROBLEM — I95.9 HYPOTENSION: Status: ACTIVE | Noted: 2017-01-01

## 2017-06-22 PROBLEM — R60.0 LOCALIZED EDEMA: Status: ACTIVE | Noted: 2017-01-01

## 2017-07-17 PROBLEM — N17.9 AKI (ACUTE KIDNEY INJURY) (HCC): Status: ACTIVE | Noted: 2017-01-01

## 2017-07-17 PROBLEM — N30.00 ACUTE CYSTITIS: Status: ACTIVE | Noted: 2017-01-01

## 2017-07-17 NOTE — ED TRIAGE NOTES
Pt in via [facility] c/o low bp. Pt seen Saturday and diagnosed with uti. Pt c/o dizziness and nausea seen at pcp today and had bp of 60/30 on ems arrival. Pt given 250ml ns by ems bp up to 95/53. bgl with ems was 97. Pt given 4mg zofran iv by ems. Denies cp/sob.

## 2017-07-17 NOTE — IP AVS SNAPSHOT
Verna Acosta 
 
 
 [address] [facility] 
[phone] Patient: Summer Alicea MRN: [id number]:[id number] You are allergic to the following Allergen Reactions Pcn (Penicillins) Unknown (comments) Amado Joe at [facility] no allergies listed Recent Documentation Height Weight BMI Smoking Status 1.6 m 51.7 kg 20.19 kg/m2 Never Smoker Unresulted Labs Order Current Status CULTURE, URINE Preliminary result Emergency Contacts Name Discharge Info Relation Home Work Mobile Shaylee Hernadez(Grand Daughter In [place])  Other Relative [6] [phone] About your hospitalization You were admitted on:  July 17, 2017 You last received care in the:  [facility] 8 MED SURG You were discharged on:  July 18, 2017 Unit phone number:  [phone] Why you were hospitalized Your primary diagnosis was:  Nitesh (Acute Kidney Injury) (Hcc) Your diagnoses also included:  Acute Cystitis, Chf (Congestive Heart Failure) (Hcc), Htn (Hypertension), Hypotension Providers Seen During Your Hospitalizations Provider Role Specialty Primary office phone Tye Lucio MD Attending Provider Emergency Medicine [phone] Juani Aviles MD Attending Provider Internal Medicine [phone] Your Primary Care Physician (PCP) Primary Care Physician Office Phone Office Fax Cindi Friday [phone] Follow-up Information Follow up With Details Comments Contact Info Joann Tomas MD  Left message with scheduling to call me back with one week appt for ACO patient. [address] ,[address] 
[phone] Your Appointments Friday July 28, 2017  2:00 PM EDT Office Visit with Ashlyn Villareal MD  
[facility] ([address] [facility]) [address] 
[address] Mouna Morales   
[phone] Current Discharge Medication List  
  
CONTINUE these medications which have NOT CHANGED Dose & Instructions Dispensing Information Comments Morning Noon Evening Bedtime  
 amiodarone 200 mg tablet Commonly known as:  CORDARONE Your next dose is:  Tomorrow Morning Dose:  100 mg Take 0.5 Tabs by mouth every morning. Quantity:  15 Tab Refills:  11  
     
   
   
   
  
 aspirin delayed-release 81 mg tablet Your next dose is:  Tomorrow Morning Dose:  81 mg Take 1 Tab by mouth daily. Quantity:  30 Tab Refills:  11  
     
  
   
   
   
  
 carvedilol 3.125 mg tablet Commonly known as:  Pink Parrot Your next dose is:  Tomorrow Morning Dose:  3.125 mg Take 1 Tab by mouth daily. Quantity:  30 Tab Refills:  11  
     
  
   
   
   
  
 cholecalciferol (VITAMIN D3) 5,000 unit Tab tablet Commonly known as:  VITAMIN D3 Your next dose is:  Tomorrow Morning Dose:  5000 Units Take 1 Tab by mouth daily. Quantity:  90 Tab Refills:  3  
     
  
   
   
   
  
 clopidogrel 75 mg Tab Commonly known as:  PLAVIX Your next dose is:  Tomorrow Morning Dose:  75 mg Take 1 Tab by mouth daily. Quantity:  30 Tab Refills:  11  
     
  
   
   
   
  
 ferrous sulfate 325 mg (65 mg iron) tablet Your next dose is:  Tomorrow Morning Dose:  325 mg Take 1 Tab by mouth Daily (before breakfast). Quantity:  90 Tab Refills:  1  
     
  
   
   
   
  
 fluticasone 50 mcg/actuation nasal spray Commonly known as:  Alley Armstrong Your next dose is:  Tomorrow Morning, 
  
   
 Dose:  2 Spray 2 Sprays by Both Nostrils route daily. Quantity:  1 Bottle Refills:  3  
     
  
   
   
   
  
 furosemide 40 mg tablet Commonly known as:  LASIX Your next dose is:  Resume home schedule Take 1 tablet daily 5 days per week and 1 tablet 2 times daily 2 days per week. Quantity:  38 Tab Refills:  11  
     
   
   
   
  
 lisinopril 2.5 mg tablet Commonly known as:  Shakira Chito Your next dose is:  Tomorrow Morning Dose:  2.5 mg Take 1 Tab by mouth daily. Quantity:  30 Tab Refills:  11 MACROBID 100 mg capsule Generic drug:  nitrofurantoin (macrocrystal-monohydrate) Dose:  100 mg Take 100 mg by mouth two (2) times a day. Refills:  0  
     
   
   
   
  
 melatonin 1 mg tablet Dose:  1 mg Take 1 Tab by mouth nightly. Quantity:  90 Tab Refills:  3  
     
   
   
   
  
 nitroglycerin 0.4 mg SL tablet Commonly known as:  NITROSTAT Dose:  0.4 mg  
1 Tab by SubLINGual route every five (5) minutes as needed for Chest Pain. Quantity:  25 Tab Refills:  11  
     
   
   
   
  
 spironolactone 25 mg tablet Commonly known as:  ALDACTONE Your next dose is:  Tomorrow Morning Dose:  25 mg Take 1 Tab by mouth daily. Quantity:  30 Tab Refills:  11  
     
  
   
   
   
  
 ZOFRAN (AS HYDROCHLORIDE) 4 mg tablet Generic drug:  ondansetron hcl Dose:  4 mg Take 4 mg by mouth every eight (8) hours as needed for Nausea. Refills:  0 Discharge Instructions Acute Kidney Injury: Care Instructions Your Care Instructions Acute kidney injury is the sudden loss of kidney function that happens when the kidneys stop working over a period of hours, days or, in some cases, weeks. It is also known as acute renal failure. Common causes of acute kidney injury are dehydration, blood loss, and medicines. When acute kidney injury happens, the kidneys cannot remove waste and excess fluids from the body. The waste and fluids build up and become harmful. Kidney function may return to normal if the cause of acute kidney injury is treated quickly.  Your chance of a full recovery depends on what caused the problem, how quickly the cause was treated, and what other medical problems you have. A machine may be used to help your kidneys remove waste and fluids for a short period of time. This is called dialysis. Follow-up care is a key part of your treatment and safety. Be sure to make and go to all appointments, and call your doctor if you are having problems. It's also a good idea to know your test results and keep a list of the medicines you take. How can you care for yourself at home? · Talk to your doctor about how much fluid you should drink. · Eat a balanced diet. Talk to your doctor or a dietitian about what type of diet may be best for you. · Follow the instructions and schedule for dialysis that your doctor gives you. · Do not smoke. Smoking can make your condition worse. If you need help quitting, talk to your doctor about stop-smoking programs and medicines. These can increase your chances of quitting for good. · Do not drink alcohol. · Review all of your medicines with your doctor. Do not take any medicines, including nonsteroidal anti-inflammatory drugs (NSAIDs) such as ibuprofen (Advil, Motrin) or naproxen (Aleve), unless your doctor says it is safe for you to do so. · Make sure that anyone treating you for any health problem knows that you have had acute kidney injury. When should you call for help? Call 911 anytime you think you may need emergency care. For example, call if: 
· You passed out (lost consciousness). · You have severe trouble breathing. Call your doctor now or seek immediate medical care if: 
· You have less urine than normal or no urine. · You have trouble urinating or can urinate only very small amounts. · You are confused or have trouble thinking clearly. · You feel weaker or more tired than usual. 
· You are very thirsty, lightheaded, or dizzy. · You have nausea and vomiting. · You have new swelling of your arms or feet, or your swelling is worse. · You have blood in your urine. · Your body weight goes up every day. · You have new or worse trouble breathing. Watch closely for changes in your health, and be sure to contact your doctor if: 
· You do not get better as expected. Where can you learn more? Go to http://liu-keith.info/. Enter P896 in the search box to learn more about \"Acute Kidney Injury: Care Instructions. \" Current as of: April 3, 2017 Content Version: 11.3 © 7418-9231 [employer]. Care instructions adapted under license by [employer] (which disclaims liability or warranty for this information). If you have questions about a medical condition or this instruction, always ask your healthcare professional. [place] [address] any warranty or liability for your use of this information. DISCHARGE SUMMARY from Nurse The following personal items are in your possession at time of discharge: 
 
Dental Appliances: None Visual Aid: Glasses Home Medications: None Jewelry: None Clothing: At bedside Other Valuables: None PATIENT INSTRUCTIONS: 
 
 
F-face looks uneven A-arms unable to move or move unevenly S-speech slurred or non-existent T-time-call 911 as soon as signs and symptoms begin-DO NOT go Back to bed or wait to see if you get better-TIME IS BRAIN. Warning Signs of HEART ATTACK Call 911 if you have these symptoms: 
? Chest discomfort. Most heart attacks involve discomfort in the center of the chest that lasts more than a few minutes, or that goes away and comes back. It can feel like uncomfortable pressure, squeezing, fullness, or pain. ? Discomfort in other areas of the upper body. Symptoms can include pain or discomfort in one or both arms, the back, neck, jaw, or stomach. ? Shortness of breath with or without chest discomfort. ? Other signs may include breaking out in a cold sweat, nausea, or lightheadedness. Don't wait more than five minutes to call [address]! Fast action can save your life. Calling 911 is almost always the fastest way to get lifesaving treatment. Emergency Medical Services staff can begin treatment when they arrive  up to an hour sooner than if someone gets to the hospital by car. The discharge information has been reviewed with the patient. The patient verbalized understanding. Discharge medications reviewed with the patient and appropriate educational materials and side effects teaching were provided. Discharge Orders None ACO Transitions of Care Introducing [employer] [address] Raeann Patiño offers a voluntary care coordination program to provide high quality service and care to [facility] fee-for-service beneficiaries. [place] was designed to help you enhance your health and well-being through the following services: ? Transitions of Care  support for individuals who are transitioning from one care setting to another (example: Hospital to home). ? Chronic and Complex Care Coordination  support for individuals and caregivers of those with serious or chronic illnesses or with more than one chronic (ongoing) condition and those who take a number of different medications. If you meet specific medical criteria, a [facility] [address] may call you directly to coordinate your care with your primary care physician and your other care providers. For questions about the [facility] programs, please, contact your physicians office. For general questions or additional information about Accountable Care Organizations: 
Please visit www.medicare.gov/acos. html or call 1-800-MEDICARE (3-811.742.4292) TTY users should call 4-831.635.7099. Keerthi Announcement  We are excited to announce that we are making your provider's discharge notes available to you in [employer]. You will see these notes when they are completed and signed by the physician that discharged you from your recent hospital stay. If you have any questions or concerns about any information you see in [employer], please call the Health Information Department where you were seen or reach out to your Primary Care Provider for more information about your plan of care. Introducing [facility]! Dear Sandra Medinas: Thank you for requesting a [employer]Locker account. Our records indicate that you already have an active [employer]Locker account. You can access your account anytime at [email or web address] Did you know that you can access your hospital and ER discharge instructions at any time in BrightLocker? You can also review all of your test results from your hospital stay or ER visit. Additional Information If you have questions, please visit the Frequently Asked Questions section of the [employer]ocker website at [email or web address] Remember, [employer]ocker is NOT to be used for urgent needs. For medical emergencies, dial 911. Now available from your iPhone and Android! General Information Please provide this summary of care documentation to your next provider. Patient Signature:  ____________________________________________________________ Date:  ____________________________________________________________  
  
Pat Seaman Provider Signature:  ____________________________________________________________ Date:  ____________________________________________________________

## 2017-07-17 NOTE — H&P
HOSPITALIST H&P/CONSULT  NAME:  Ji Chavez   Age:  80 y.o.  :   7/15/1926   MRN:   [id number]  PCP: Andra Larsen MD  Treatment Team: Attending Provider: Vikash Simpson MD; Primary Nurse: Lalita Bustos RN    No Order     CC: Reason for admission is: ASHIA    HPI:   Patient case was reviewed with the ER provider prior to seeing the patient. Patient is a 80 y.o. female who presents to the ER due to generalized weakness and nausea for 4 days. She went to urgent care 2 days ago and dx'd with a UTI. She was placed on Macrobid. Over the last 2 days, she has worsened. Still with nausea, now decreased PO intake. No vomiting since Friday. Denies abdominal pain or dysuria. She went to her PCP today and was noted to be hypotensive with a SBP in the 70's. She was sent to ER by EMS. Further ER w/u showed ASHIA and we were asked to admit for ASHIA, UTI and hypotension. ROS:  All systems have been reviewed and are negative except as stated in HPI or elsewhere.       Past Medical History:   Diagnosis Date    AICD (automatic cardioverter/defibrillator) present     Anemia 10/25/2012    Aortic valve regurgitation     Arrhythmia     blessing, pacemaker, a fib    CAD (coronary artery disease) 2012    stent    [facility]) [facility] 2012    CHF (congestive heart failure) ([address] 2012    Chronic kidney disease, stage II (mild)     Chronic systolic heart failure ([address] 2013    Diverticulitis     Encounter for long-term (current) use of other medications 2012    Esophagitis 2010    EGD    Falls     Femur fracture, left ([address] 2016    Fracture of left radius 2016    left radius and ulna fx    Fracture of wrist     left/pins    HLD (hyperlipidemia) 2012    HTN (hypertension) 2012    pt denies    Insomnia     Ischemic cardiomyopathy, chronic     Left bundle branch block (LBBB)     Osteoporosis 2012    S/P PTCA (percutaneous transluminal coronary angioplasty)     Sigmoid stricture (HCC)     Dr. Makayla Kilpatrick    Stiffness in joint     left hip, left wrist    Varicose vein     surg/ Dr. Kassie Fabry      Past Surgical History:   Procedure Laterality Date    HX CYST REMOVAL  2009    right hand    HX GYN      hysterectomy, bladder sling    HX HIP REPLACEMENT      after fracture    HX IMPLANTABLE CARDIOVERTER DEFIBRILLATOR      HX ORTHOPAEDIC      lt hand    HX PTCA        Social History   Substance Use Topics    Smoking status: Never Smoker    Smokeless tobacco: Never Used    Alcohol use Yes      Comment: occasional      History reviewed. No pertinent family history. FH Reviewed and non-contributory to admitting diagnosis    Allergies   Allergen Reactions    Pcn [Penicillins] Unknown (comments)     Shyam Patrick at [facility] no allergies listed      Prior to Admission Medications   Prescriptions Last Dose Informant Patient Reported? Taking?   amiodarone (CORDARONE) 200 mg tablet   No Yes   Sig: Take 0.5 Tabs by mouth every morning. aspirin delayed-release 81 mg tablet   No Yes   Sig: Take 1 Tab by mouth daily. carvedilol (COREG) 3.125 mg tablet   No Yes   Sig: Take 1 Tab by mouth daily. cholecalciferol, VITAMIN D3, (VITAMIN D3) 5,000 unit tab tablet   No Yes   Sig: Take 1 Tab by mouth daily. clopidogrel (PLAVIX) 75 mg tab   No Yes   Sig: Take 1 Tab by mouth daily. ferrous sulfate 325 mg (65 mg iron) tablet   No Yes   Sig: Take 1 Tab by mouth Daily (before breakfast). fluticasone (FLONASE) 50 mcg/actuation nasal spray   No Yes   Si Sprays by Both Nostrils route daily. furosemide (LASIX) 40 mg tablet   No Yes   Sig: Take 1 tablet daily 5 days per week and 1 tablet 2 times daily 2 days per week. lisinopril (PRINIVIL, ZESTRIL) 2.5 mg tablet   No Yes   Sig: Take 1 Tab by mouth daily. melatonin 1 mg tablet   No Yes   Sig: Take 1 Tab by mouth nightly.    nitrofurantoin, macrocrystal-monohydrate, (MACROBID) 100 mg capsule   Yes Yes Sig: Take 100 mg by mouth two (2) times a day. nitroglycerin (NITROSTAT) 0.4 mg SL tablet   No Yes   Si Tab by SubLINGual route every five (5) minutes as needed for Chest Pain. ondansetron hcl (ZOFRAN, AS HYDROCHLORIDE,) 4 mg tablet   Yes Yes   Sig: Take 4 mg by mouth every eight (8) hours as needed for Nausea. spironolactone (ALDACTONE) 25 mg tablet   No Yes   Sig: Take 1 Tab by mouth daily. Facility-Administered Medications: None         Objective:   Patient Vitals for the past 24 hrs:   Temp Pulse Resp BP SpO2   17 97.5 °F (36.4 °C) 70 16 95/53 91 %     No intake or output data in the 24 hours ending 17   Temp (24hrs), Av.5 °F (36.4 °C), Min:97.4 °F (36.3 °C), Max:97.5 °F (36.4 °C)    Oxygen Therapy  O2 Sat (%): 91 % (17)  O2 Device: Room air (17)   Body mass index is 20.19 kg/(m^2). Physical Exam:    General:    WD and WN, thin, No apparent distress. Head:   Normocephalic, without obvious abnormality, atraumatic. Eyes:  PERRL; EOMI; sclera normal/non-icteric  ENT:  Hearing is normal.  Oropharynx is clear with tacky mucous membranes   Resp:    Clear to auscultation bilaterally. No Wheezing or Rhonchi. Resp are even and unlabored  Heart[de-identified]  Regular rate and rhythm,  no murmur,   No LE edema  Abdomen:   Soft, non-tender. Not distended. Bowel sounds normal.  hepato-splenomegaly -none  Musc/SK: Muscle strength is good and tone normal; No cyanosis. No clubbing  Skin:     Texture, turgor normal. No significant rashes or lesions.    Neurologic: CN II - XII are grossly intact - other than Eye exam as noted above  Psych: Alert and oriented x 4;  Judgement and insight are normal     Data Review:   Recent Results (from the past 24 hour(s))   CBC WITH AUTOMATED DIFF    Collection Time: 17  5:11 PM   Result Value Ref Range    WBC 11.5 (H) 4.3 - 11.1 K/uL    RBC 3.93 (L) 4.05 - 5.25 M/uL    HGB 12.7 11.7 - 15.4 g/dL    HCT 37.3 35.8 - 46.3 %    MCV 94.9 79.6 - 97.8 FL    MCH 32.3 26.1 - 32.9 PG    MCHC 34.0 31.4 - 35.0 g/dL    RDW 15.4 (H) 11.9 - 14.6 %    PLATELET 063 762 - 722 K/uL    MPV 11.1 10.8 - 14.1 FL    DF AUTOMATED      NEUTROPHILS 89 (H) 43 - 78 %    LYMPHOCYTES 3 (L) 13 - 44 %    MONOCYTES 7 4.0 - 12.0 %    EOSINOPHILS 1 0.5 - 7.8 %    BASOPHILS 0 0.0 - 2.0 %    IMMATURE GRANULOCYTES 0.3 0.0 - 5.0 %    ABS. NEUTROPHILS 10.1 (H) 1.7 - 8.2 K/UL    ABS. LYMPHOCYTES 0.3 (L) 0.5 - 4.6 K/UL    ABS. MONOCYTES 0.8 0.1 - 1.3 K/UL    ABS. EOSINOPHILS 0.2 0.0 - 0.8 K/UL    ABS. BASOPHILS 0.0 0.0 - 0.2 K/UL    ABS. IMM. GRANS. 0.0 0.0 - 0.5 K/UL   METABOLIC PANEL, COMPREHENSIVE    Collection Time: 07/17/17  5:11 PM   Result Value Ref Range    Sodium 137 136 - 145 mmol/L    Potassium 4.3 3.5 - 5.1 mmol/L    Chloride 101 98 - 107 mmol/L    CO2 23 21 - 32 mmol/L    Anion gap 13 7 - 16 mmol/L    Glucose 91 65 - 100 mg/dL    BUN 84 (H) 8 - 23 MG/DL    Creatinine 2.48 (H) 0.6 - 1.0 MG/DL    GFR est AA 23 (L) >60 ml/min/1.73m2    GFR est non-AA 19 (L) >60 ml/min/1.73m2    Calcium 9.2 8.3 - 10.4 MG/DL    Bilirubin, total 0.6 0.2 - 1.1 MG/DL    ALT (SGPT) 20 12 - 65 U/L    AST (SGOT) 17 15 - 37 U/L    Alk. phosphatase 96 50 - 136 U/L    Protein, total 6.9 6.3 - 8.2 g/dL    Albumin 3.0 (L) 3.2 - 4.6 g/dL    Globulin 3.9 (H) 2.3 - 3.5 g/dL    A-G Ratio 0.8 (L) 1.2 - 3.5     POC LACTIC ACID    Collection Time: 07/17/17  6:10 PM   Result Value Ref Range    Lactic Acid (POC) 0.9 0.5 - 1.9 mmol/L     CXR Results  (Last 48 hours)    None        CT Results  (Last 48 hours)    None          I reviewed the labs, imaging, EKGs, telemetry, and other studies done this admission. Assessment and Plan:      Active Hospital Problems    Diagnosis Date Noted    [facility] 07/17/2017    Acute cystitis 07/17/2017    Hypotension 05/24/2017    CH[facility]est[facility] 09/04/2012     right      HTN (hypertension) 09/04/2012         · PLAN   · IVF; monitor for fluid overload due to CHF  · IV abx; rocephin  · Cont appropriate home meds (see MAR)  · Control symptoms (pain, n/v, fever, etc)  · Monitor appropriate labs DVT prophylaxis:  Lovenox  · Code status: Full  · Risk: high  · Anticipated DC needs: none  · Estimated LOS:  Greater than 2 midnights  · Plans discussed with patient and/or caregiver; questions answered.       Med records reviewed if applicable; findings:     Critical care time if applicable:      Signed By: Bruce Willis MD     July 17, 2017

## 2017-07-17 NOTE — ED PROVIDER NOTES
HPI Comments: 58-year-old white female has had generalized weakness and nausea for the past 4 days. She was seen at urgent care  2 days ago and at that time was placed on Macrobid for UTI although no urinalysis was obtained. He reports that she has had no improvement in symptoms. She is still nauseated and has had decreased appetite with minimal intake. She has not actually thrown up since Friday. She denies abdominal pain. Denies urinary symptoms. Today she went to primary care physician for recheck and was found to be hypotensive in the 70s. She was treated with IV fluids by EMS. Patient is a 80 y.o. female presenting with hypotension. The history is provided by the patient. Hypotension    Associated symptoms include weakness.         Past Medical History:   Diagnosis Date    AICD (automatic cardioverter/defibrillator) present     Anemia 10/25/2012    Aortic valve regurgitation     Arrhythmia     blessing, pacemaker, a fib    CAD (coronary artery disease) 9/4/2012    stent    CHF (congestive heart failure) ([address] 9/4/2012    CHF (congestive heart failure) ([address] 9/4/2012    Chronic kidney disease, stage II (mild)     Chronic systolic heart failure ([address] 4/16/2013    Diverticulitis     Encounter for long-term (current) use of other medications 11/19/2012    Esophagitis 2010    EGD    Falls     Femur fracture, left ([address] 12/02/2016    Fracture of left radius 12/02/2016    left radius and ulna fx    Fracture of wrist 2003    left/pins    HLD (hyperlipidemia) 9/4/2012    HTN (hypertension) 9/4/2012    pt denies    Insomnia     Ischemic cardiomyopathy, chronic     Left bundle branch block (LBBB)     Osteoporosis 9/4/2012    S/P PTCA (percutaneous transluminal coronary angioplasty)     Sigmoid stricture (HCC)     Dr. Mary Velazquez    Stiffness in joint     left hip, left wrist    Varicose vein 2011    surg/ Dr. Carlos Leonardo       Past Surgical History:   Procedure Laterality Date    HX CYST REMOVAL  2009    right hand    HX GYN      hysterectomy, bladder sling    HX HIP REPLACEMENT      after fracture    HX IMPLANTABLE CARDIOVERTER DEFIBRILLATOR      HX ORTHOPAEDIC      lt hand    HX PTCA  2012         History reviewed. No pertinent family history. Social History     Social History    Marital status: SINGLE     Spouse name: N/A    Number of children: N/A    Years of education: N/A     Occupational History    Not on file. Social History Main Topics    Smoking status: Never Smoker    Smokeless tobacco: Never Used    Alcohol use Yes      Comment: occasional    Drug use: No    Sexual activity: Not on file     Other Topics Concern    Not on file     Social History Narrative    /no living children         ALLERGIES: Pcn [penicillins]    Review of Systems   Constitutional: Negative for fever. HENT: Negative for congestion. Respiratory: Negative for cough and shortness of breath. Cardiovascular: Negative for chest pain. Gastrointestinal: Positive for nausea and vomiting. Negative for abdominal pain and diarrhea. Genitourinary: Negative for dysuria. Musculoskeletal: Negative for back pain and neck pain. Skin: Negative for rash. Neurological: Positive for weakness. Negative for headaches. Vitals:    07/17/17 1658   BP: 95/53   Pulse: 70   Resp: 16   Temp: 97.5 °F (36.4 °C)   SpO2: 91%   Weight: 51.7 kg (114 lb)   Height: 5' 3\" (1.6 m)            Physical Exam   Constitutional: She is oriented to person, place, and time. She appears well-developed and well-nourished. No distress. HENT:   Head: Normocephalic and atraumatic. Mouth/Throat: Oropharynx is clear and moist.   Eyes: Conjunctivae are normal. Pupils are equal, round, and reactive to light. No scleral icterus. Neck: Normal range of motion. Neck supple. Cardiovascular: Normal rate and regular rhythm. No murmur heard. Pulmonary/Chest: Effort normal and breath sounds normal. She has no wheezes. Abdominal: She exhibits no distension. There is no tenderness. Musculoskeletal: Normal range of motion. She exhibits no edema. Neurological: She is alert and oriented to person, place, and time. Skin: Skin is warm and dry. Psychiatric: She has a normal mood and affect. Nursing note and vitals reviewed. MDM  Number of Diagnoses or Management Options  Acute kidney injury [facility]):   Hypotension, unspecified hypotension type:   Diagnosis management comments: Blood work reveals a mild leukocytosis 11.5 with a leftward shift. Lactic acid is normal. basic panel shows acute kidney injury with a creatinine of 2.48 BUN of 84. Patient has been treated with 1.5 L of normal saline. Blood pressure has improved a bit. At this time patient has still not been able to urinate. She is adamantly refusing bladder catheterization at this time. Because of her acute kidney injury will admit for further hydration and may need antibiotics if urinalysis shows UTI. At this time, the patient is afebrile and has a normal lactic acid. Blood pressure has improved with IV fluids. She does have significant renal insufficiency which I think is the source of her hypotension. At this time I do not believe that she is septic.        Amount and/or Complexity of Data Reviewed  Clinical lab tests: ordered and reviewed  Tests in the medicine section of CPT®: ordered and reviewed  Discuss the patient with other providers: yes    Risk of Complications, Morbidity, and/or Mortality  Presenting problems: moderate  Diagnostic procedures: moderate  Management options: moderate      ED Course       Procedures

## 2017-07-18 NOTE — DISCHARGE SUMMARY
Physician Discharge Summary       Patient: Jaquan Dhillon MRN: [id number]  SSN: [id number]    YOB: 1926  Age: 80 y.o. Sex: female    PCP: Mando Tom MD    Admit date: 7/17/2017  Admitting Provider: Carmelina Lyman MD    Discharge date: 7/18/2017  Discharging Provider: Andrade Thomas NP    Admission Diagnoses: ASHIA   Dehydration  Acute cystitis  Partially treated UTI  Hypotension with baseline hypertension  Chronic CHF  AICD  Anemia  CAD with stent  CKD III  Hypertension  Chronic ischemic cardiomyopathy    Discharge Diagnoses:    ASHIA, resolving   Dehydration, resolved  Acute cystitis  Partially treated UTI  Hypotension with baseline hypertension, resolved  Chronic CHF  AICD  Anemia  CAD with stent  CKD III  Hypertension  Chronic ischemic cardiomyopathy    Hospital Course:   Patient presented to [facility] office 7/17 with complaints of fatigue, vague nausea without vomiting, malaise, weakness x 4 days, 2 days after being seen at a local Urgent Care for UTI, treatment with macrodantin. Recent lasix increase per her cardiologist and is found with systolic blood pressure of 86/. She was then transported to ER where she is found in ASHIA with creatinine of 2.48, BUN 84, WBC:  11.4, normal lactic aciddehydration. She appeared ill but not septic. She is admitted to Observation. 1.5 liter of IVF initially, then gentle hydration begun. she improved dramatically with fluids. She was also given rocephin. She feels much better with improved labs on 7/18 and will be discharge to home to resume macrodantin for the remainder of the five day course and follow up with Dr Valarie Dorsey later this week. Procedures:  None     Consults:   Social Work    Significant Diagnostic Studies:   ADMISSION LABS:     Ref.  Range 7/17/2017 17:11   WBC Latest Ref Range: 4.3 - 11.1 K/uL 11.5 (H)   RBC Latest Ref Range: 4.05 - 5.25 M/uL 3.93 (L)   HGB Latest Ref Range: 11.7 - 15.4 g/dL 12.7   HCT Latest Ref Range: 35.8 - 46.3 % 37.3   MCV Latest Ref Range: 79.6 - 97.8 FL 94.9   MCH Latest Ref Range: 26.1 - 32.9 PG 32.3   MCHC Latest Ref Range: 31.4 - 35.0 g/dL 34.0   RDW Latest Ref Range: 11.9 - 14.6 % 15.4 (H)   PLATELET Latest Ref Range: 150 - 450 K/uL 192   MPV Latest Ref Range: 10.8 - 14.1 FL 11.1   NEUTROPHILS Latest Ref Range: 43 - 78 % 89 (H)   LYMPHOCYTES Latest Ref Range: 13 - 44 % 3 (L)   MONOCYTES Latest Ref Range: 4.0 - 12.0 % 7   EOSINOPHILS Latest Ref Range: 0.5 - 7.8 % 1   BASOPHILS Latest Ref Range: 0.0 - 2.0 % 0   IMMATURE GRANULOCYTES Latest Ref Range: 0.0 - 5.0 % 0.3   DF Latest Units:   AUTOMATED   ABS. NEUTROPHILS Latest Ref Range: 1.7 - 8.2 K/UL 10.1 (H)   ABS. IMM. GRANS. Latest Ref Range: 0.0 - 0.5 K/UL 0.0   ABS. LYMPHOCYTES Latest Ref Range: 0.5 - 4.6 K/UL 0.3 (L)   ABS. MONOCYTES Latest Ref Range: 0.1 - 1.3 K/UL 0.8   ABS. EOSINOPHILS Latest Ref Range: 0.0 - 0.8 K/UL 0.2   ABS. BASOPHILS Latest Ref Range: 0.0 - 0.2 K/UL 0.0   Sodium Latest Ref Range: 136 - 145 mmol/L 137   Potassium Latest Ref Range: 3.5 - 5.1 mmol/L 4.3   Chloride Latest Ref Range: 98 - 107 mmol/L 101   CO2 Latest Ref Range: 21 - 32 mmol/L 23   Anion gap Latest Ref Range: 7 - 16 mmol/L 13   Glucose Latest Ref Range: 65 - 100 mg/dL 91   BUN Latest Ref Range: 8 - 23 MG/DL 84 (H)   Creatinine Latest Ref Range: 0.6 - 1.0 MG/DL 2.48 (H)   Calcium Latest Ref Range: 8.3 - 10.4 MG/DL 9.2   GFR est non-AA Latest Ref Range: >60 ml/min/1.73m2 19 (L)   GFR est AA Latest Ref Range: >60 ml/min/1.73m2 23 (L)   Bilirubin, total Latest Ref Range: 0.2 - 1.1 MG/DL 0.6   Protein, total Latest Ref Range: 6.3 - 8.2 g/dL 6.9   Albumin Latest Ref Range: 3.2 - 4.6 g/dL 3.0 (L)   Globulin Latest Ref Range: 2.3 - 3.5 g/dL 3.9 (H)   A-G Ratio Latest Ref Range: 1.2 - 3.5   0.8 (L)   ALT (SGPT) Latest Ref Range: 12 - 65 U/L 20   AST Latest Ref Range: 15 - 37 U/L 17   Alk. phosphatase Latest Ref Range: 50 - 136 U/L 96        Ref.  Range 7/17/2017 19:15   Epithelial cells Latest Ref Range: 0 /hpf 0   WBC Latest Ref Range: 0 /hpf    RBC Latest Ref Range: 0 /hpf 0-3   Bacteria Latest Ref Range: 0 /hpf 0   Casts Latest Ref Range: 0 /lpf 0-3        Ref. Range 7/18/2017 07:09   WBC Latest Ref Range: 4.3 - 11.1 K/uL 8.0   RBC Latest Ref Range: 4.05 - 5.25 M/uL 3.82 (L)   HGB Latest Ref Range: 11.7 - 15.4 g/dL 12.3   HCT Latest Ref Range: 35.8 - 46.3 % 37.0   MCV Latest Ref Range: 79.6 - 97.8 FL 96.9   MCH Latest Ref Range: 26.1 - 32.9 PG 32.2   MCHC Latest Ref Range: 31.4 - 35.0 g/dL 33.2   RDW Latest Ref Range: 11.9 - 14.6 % 15.6 (H)   PLATELET Latest Ref Range: 150 - 450 K/uL 195   MPV Latest Ref Range: 10.8 - 14.1 FL 11.7   NEUTROPHILS Latest Ref Range: 43 - 78 % 85 (H)   LYMPHOCYTES Latest Ref Range: 13 - 44 % 6 (L)   MONOCYTES Latest Ref Range: 4.0 - 12.0 % 7   EOSINOPHILS Latest Ref Range: 0.5 - 7.8 % 2   BASOPHILS Latest Ref Range: 0.0 - 2.0 % 0   IMMATURE GRANULOCYTES Latest Ref Range: 0.0 - 5.0 % 0.4   DF Latest Units:   AUTOMATED   ABS. NEUTROPHILS Latest Ref Range: 1.7 - 8.2 K/UL 6.7   ABS. IMM. GRANS. Latest Ref Range: 0.0 - 0.5 K/UL 0.0   ABS. LYMPHOCYTES Latest Ref Range: 0.5 - 4.6 K/UL 0.5   ABS. MONOCYTES Latest Ref Range: 0.1 - 1.3 K/UL 0.6   ABS. EOSINOPHILS Latest Ref Range: 0.0 - 0.8 K/UL 0.2   ABS.  BASOPHILS Latest Ref Range: 0.0 - 0.2 K/UL 0.0   Sodium Latest Ref Range: 136 - 145 mmol/L 142   Potassium Latest Ref Range: 3.5 - 5.1 mmol/L 3.9   Chloride Latest Ref Range: 98 - 107 mmol/L 110 (H)   CO2 Latest Ref Range: 21 - 32 mmol/L 21   Anion gap Latest Ref Range: 7 - 16 mmol/L 11   Glucose Latest Ref Range: 65 - 100 mg/dL 70   BUN Latest Ref Range: 8 - 23 MG/DL 69 (H)   Creatinine Latest Ref Range: 0.6 - 1.0 MG/DL 1.86 (H)   Calcium Latest Ref Range: 8.3 - 10.4 MG/DL 9.0   GFR est non-AA Latest Ref Range: >60 ml/min/1.73m2 27 (L)   GFR est AA Latest Ref Range: >60 ml/min/1.73m2 33 (L)   Bilirubin, total Latest Ref Range: 0.2 - 1.1 MG/DL 0.4   Protein, total Latest Ref Range: 6.3 - 8.2 g/dL 6.5   Albumin Latest Ref Range: 3.2 - 4.6 g/dL 2.9 (L)   Globulin Latest Ref Range: 2.3 - 3.5 g/dL 3.6 (H)   A-G Ratio Latest Ref Range: 1.2 - 3.5   0.8 (L)   ALT (SGPT) Latest Ref Range: 12 - 65 U/L 19   AST Latest Ref Range: 15 - 37 U/L 17   Alk. phosphatase Latest Ref Range: 50 - 136 U/L 83       URINE CULTURE PENDING    Discharge Exam:  Visit Vitals    /54    Pulse 71    Temp 97.8 °F (36.6 °C)    Resp 14    Ht 5' 3\" (1.6 m)    Wt 51.7 kg (114 lb)    SpO2 96%    BMI 20.19 kg/m2     General appearance: Oriented and alert, cooperative, daughter at bedside. Feeling much better. Head: Normocephalic, without obvious abnormality, atraumatic  Eyes: conjunctivae/corneas clear. PERRL  Neck: supple, symmetrical, trachea midline, no JVD  Lungs: clear to auscultation bilaterally  Heart: Irreg-regular rate and rhythm, S1, S2 normal, + murmur, click, rub or gallop  Abdomen: soft, non-tender. Bowel sounds normal. No masses,  no organomegaly  Extremities: extremities normal, atraumatic, no cyanosis or edema  Skin: Skin color pale, texture, turgor normal. No rashes or lesions  Neurologic: Grossly normal    Discharge Condition: good, improved and stable  Disposition: Home    Discharge Medications:  Current Discharge Medication List      CONTINUE these medications which have NOT CHANGED    Details   ondansetron hcl (ZOFRAN, AS HYDROCHLORIDE,) 4 mg tablet Take 4 mg by mouth every eight (8) hours as needed for Nausea. nitrofurantoin, macrocrystal-monohydrate, (MACROBID) 100 mg capsule Take 100 mg by mouth two (2) times a day. spironolactone (ALDACTONE) 25 mg tablet Take 1 Tab by mouth daily. Qty: 30 Tab, Refills: 11      fluticasone (FLONASE) 50 mcg/actuation nasal spray 2 Sprays by Both Nostrils route daily. Qty: 1 Bottle, Refills: 3      amiodarone (CORDARONE) 200 mg tablet Take 0.5 Tabs by mouth every morning.   Qty: 15 Tab, Refills: 11      carvedilol (COREG) 3.125 mg tablet Take 1 Tab by mouth daily. Qty: 30 Tab, Refills: 11      furosemide (LASIX) 40 mg tablet Take 1 tablet daily 5 days per week and 1 tablet 2 times daily 2 days per week. Qty: 38 Tab, Refills: 11      lisinopril (PRINIVIL, ZESTRIL) 2.5 mg tablet Take 1 Tab by mouth daily. Qty: 30 Tab, Refills: 11      clopidogrel (PLAVIX) 75 mg tab Take 1 Tab by mouth daily. Qty: 30 Tab, Refills: 11      cholecalciferol, VITAMIN D3, (VITAMIN D3) 5,000 unit tab tablet Take 1 Tab by mouth daily. Qty: 90 Tab, Refills: 3      melatonin 1 mg tablet Take 1 Tab by mouth nightly. Qty: 90 Tab, Refills: 3      ferrous sulfate 325 mg (65 mg iron) tablet Take 1 Tab by mouth Daily (before breakfast). Qty: 90 Tab, Refills: 1      aspirin delayed-release 81 mg tablet Take 1 Tab by mouth daily. Qty: 30 Tab, Refills: 11      nitroglycerin (NITROSTAT) 0.4 mg SL tablet 1 Tab by SubLINGual route every five (5) minutes as needed for Chest Pain. Qty: 25 Tab, Refills: 11             Follow-up Care/Patient Instructions: Activity: Activity as tolerated  Diet: Regular Diet    Follow-up Information     Follow up With Details Comments 1 Janey Liang MD  Left message with scheduling to call me back with one week appt for [facility] patient.   [address] [facility]  [phone]            Signed:  James Stewart NP  7/18/2017  3:00 PM    DISCHARGE TIME:  40 MINUTES

## 2017-07-18 NOTE — PROGRESS NOTES
Virtual Utilization Review RN  has determined this patient meets the Condition Code 44 criteria under the Medicare guidelines for change from Inpatient to observation status. This case sent to second level physician review for correct status determination and agreed upon by your attending MD. Stan Mccurdy. Admission status has been changed in the computer system. A copy of the Utilization Review RN documentation and the  P[facility]UA letter explaining the  outpatient/observation services was given to  (Patient/representative) with verbal explanation and verbal understanding about information given. Letter signed by (patient/representative) with date and time. Signed copy placed in the patients chart for scanning by HIS and copy left at bedside for patient information.      Sherie Haney,

## 2017-07-18 NOTE — DISCHARGE INSTRUCTIONS
Acute Kidney Injury: Care Instructions  Your Care Instructions  Acute kidney injury is the sudden loss of kidney function that happens when the kidneys stop working over a period of hours, days or, in some cases, weeks. It is also known as acute renal failure. Common causes of acute kidney injury are dehydration, blood loss, and medicines. When acute kidney injury happens, the kidneys cannot remove waste and excess fluids from the body. The waste and fluids build up and become harmful. Kidney function may return to normal if the cause of acute kidney injury is treated quickly. Your chance of a full recovery depends on what caused the problem, how quickly the cause was treated, and what other medical problems you have. A machine may be used to help your kidneys remove waste and fluids for a short period of time. This is called dialysis. Follow-up care is a key part of your treatment and safety. Be sure to make and go to all appointments, and call your doctor if you are having problems. It's also a good idea to know your test results and keep a list of the medicines you take. How can you care for yourself at home? · Talk to your doctor about how much fluid you should drink. · Eat a balanced diet. Talk to your doctor or a dietitian about what type of diet may be best for you. · Follow the instructions and schedule for dialysis that your doctor gives you. · Do not smoke. Smoking can make your condition worse. If you need help quitting, talk to your doctor about stop-smoking programs and medicines. These can increase your chances of quitting for good. · Do not drink alcohol. · Review all of your medicines with your doctor. Do not take any medicines, including nonsteroidal anti-inflammatory drugs (NSAIDs) such as ibuprofen (Advil, Motrin) or naproxen (Aleve), unless your doctor says it is safe for you to do so.   · Make sure that anyone treating you for any health problem knows that you have had acute kidney injury. When should you call for help? Call 911 anytime you think you may need emergency care. For example, call if:  · You passed out (lost consciousness). · You have severe trouble breathing. Call your doctor now or seek immediate medical care if:  · You have less urine than normal or no urine. · You have trouble urinating or can urinate only very small amounts. · You are confused or have trouble thinking clearly. · You feel weaker or more tired than usual.  · You are very thirsty, lightheaded, or dizzy. · You have nausea and vomiting. · You have new swelling of your arms or feet, or your swelling is worse. · You have blood in your urine. · Your body weight goes up every day. · You have new or worse trouble breathing. Watch closely for changes in your health, and be sure to contact your doctor if:  · You do not get better as expected. Where can you learn more? Go to http://liu-keith.info/. Enter J280 in the search box to learn more about \"Acute Kidney Injury: Care Instructions. \"  Current as of: April 3, 2017  Content Version: 11.3  © 9623-3095 [employer]. Care instructions adapted under license by [employer] (which disclaims liability or warranty for this information). If you have questions about a medical condition or this instruction, always ask your healthcare professional. [address] any warranty or liability for your use of this information. DISCHARGE SUMMARY from Nurse    The following personal items are in your possession at time of discharge:    Dental Appliances: None  Visual Aid: Glasses     Home Medications: None  Jewelry: None  Clothing:  At bedside  Other Valuables: None             PATIENT INSTRUCTIONS:    After general anesthesia or intravenous sedation, for 24 hours or while taking prescription Narcotics:  · Limit your activities  · Do not drive and operate hazardous machinery  · Do not make important personal or business decisions  · Do  not drink alcoholic beverages  · If you have not urinated within 8 hours after discharge, please contact your surgeon on call. Report the following to your surgeon:  · Excessive pain, swelling, redness or odor of or around the surgical area  · Temperature over 100.5  · Nausea and vomiting lasting longer than 4 hours or if unable to take medications  · Any signs of decreased circulation or nerve impairment to extremity: change in color, persistent  numbness, tingling, coldness or increase pain  · Any questions        What to do at Home:  Recommended activity: Activity as tolerated,     *  Please give a list of your current medications to your Primary Care Provider. *  Please update this list whenever your medications are discontinued, doses are      changed, or new medications (including over-the-counter products) are added. *  Please carry medication information at all times in case of emergency situations. These are general instructions for a healthy lifestyle:    No smoking/ No tobacco products/ Avoid exposure to second hand smoke    Surgeon General's Warning:  Quitting smoking now greatly reduces serious risk to your health. Obesity, smoking, and sedentary lifestyle greatly increases your risk for illness    A healthy diet, regular physical exercise & weight monitoring are important for maintaining a healthy lifestyle    You may be retaining fluid if you have a history of heart failure or if you experience any of the following symptoms:  Weight gain of 3 pounds or more overnight or 5 pounds in a week, increased swelling in our hands or feet or shortness of breath while lying flat in bed. Please call your doctor as soon as you notice any of these symptoms; do not wait until your next office visit.     Recognize signs and symptoms of STROKE:    F-face looks uneven    A-arms unable to move or move unevenly    S-speech slurred or non-existent    T-time-call 911 as soon as signs and symptoms begin-DO NOT go       Back to bed or wait to see if you get better-TIME IS BRAIN. Warning Signs of HEART ATTACK     Call 911 if you have these symptoms:   Chest discomfort. Most heart attacks involve discomfort in the center of the chest that lasts more than a few minutes, or that goes away and comes back. It can feel like uncomfortable pressure, squeezing, fullness, or pain.  Discomfort in other areas of the upper body. Symptoms can include pain or discomfort in one or both arms, the back, neck, jaw, or stomach.  Shortness of breath with or without chest discomfort.  Other signs may include breaking out in a cold sweat, nausea, or lightheadedness. Don't wait more than five minutes to call 911 - MINUTES MATTER! Fast action can save your life. Calling 911 is almost always the fastest way to get lifesaving treatment. Emergency Medical Services staff can begin treatment when they arrive -- up to an hour sooner than if someone gets to the hospital by car. The discharge information has been reviewed with the patient. The patient verbalized understanding. Discharge medications reviewed with the patient and appropriate educational materials and side effects teaching were provided.

## 2017-07-18 NOTE — PROGRESS NOTES
TRANSFER - IN REPORT:    Verbal report received from Jules Fuentes RN(name) on Rk Mckeon  being received from ED(unit) for routine progression of care      Report consisted of patients Situation, Background, Assessment and   Recommendations(SBAR). Information from the following report(s) SBAR, Kardex, ED Summary, [facility] and Recent Results was reviewed with the receiving nurse. Opportunity for questions and clarification was provided. Assessment completed upon patients arrival to unit and care assumed.

## 2017-07-18 NOTE — PROGRESS NOTES
Report given to [facility], [address] [facility] [place]. Opportunities for questions provided. Patient remains stable.

## 2017-07-18 NOTE — PROGRESS NOTES
Patient resting quietly in bed, call light within reach. Respirations even and unlabored on room air. No acute distress noted. Will continue to monitor.

## 2017-07-18 NOTE — PROGRESS NOTES
Patient reports zofran effective for nausea. Resting in bed, call light within reach. No acute distress noted. Will continue to monitor. Primary Nurse Alejandro Grace and Sameer Smith RN performed a dual skin assessment on this patient. Skin warm dry and intact. Elbows, knees and heels warm dry and intact. No redness or skin breakdown noted. Will continue to monitor.

## 2017-07-18 NOTE — PROGRESS NOTES
Shift assessment completed. Pt. Alert and oriented x4. Room air. No acute distress noted. NS infusing @ 125ml/hr this am. Call light in reach. Set up for breakfast. Instructed to call for assistance. Door open.

## 2017-07-18 NOTE — PROGRESS NOTES
Patient complaint of nausea. Received verbal order per Dr Viktoria Olivas for Deyanira Wood. Will continue to monitor.

## 2017-07-18 NOTE — PROGRESS NOTES
Pt. resting in bed with eyes closed. Room air. Iv fluids infusing. Call light in reach. Instructed to call for assistance.

## 2017-07-28 PROBLEM — I50.22 CHRONIC SYSTOLIC HEART FAILURE (HCC): Status: ACTIVE | Noted: 2017-01-01

## 2017-08-16 NOTE — ED TRIAGE NOTES
Per the family the pt has had a low blood pressure and diarrhea all day. Family called her md and was told to bring her to the er for evaluation. Pt states upper back pain and upper left arm pain with movement. Pt also has painful urination.

## 2017-08-16 NOTE — IP AVS SNAPSHOT
Perry Oropeza 
 
 
 [address] [facility] [address] [facility] 
[phone] Patient: Boaz Guerrero MRN: [id number] CFJ:[id number] You are allergic to the following Allergen Reactions Pcn (Penicillins) Unknown (comments) Vika Johnson at [facility] no allergies listed Recent Documentation Height Weight BMI OB Status Smoking Status 1.6 m 55.3 kg 21.61 kg/m2 Postmenopausal Never Smoker Unresulted Labs Order Current Status CULTURE, URINE Preliminary result Emergency Contacts Name Discharge Info Relation Home Work Mobile Shaylee Hernadez(Grand Daughter In [place])  Other Relative [6] [phone] About your hospitalization You were admitted on:  August 16, 2017 You last received care in the:  [facility] 6 MED SURG You were discharged on:  August 18, 2017 Unit phone number:  [phone] Why you were hospitalized Your primary diagnosis was:  Not on File Your diagnoses also included:  Hypotension Providers Seen During Your Hospitalizations Provider Role Specialty Primary office phone Antonio Tillman MD Attending Provider Emergency Medicine [phone] Doyle Miller MD Attending Provider Internal Medicine [phone] Your Primary Care Physician (PCP) Primary Care Physician Office Phone Office Fax Momo Parson [phone] Follow-up Information Follow up With Details Comments Contact Info Kenton Obregon MD  left message for office to call patient with appointment date and time. [address] [facility] [address] 
[phone] Your Appointments Friday September 08, 2017 10:45 AM EDT Office Visit with Ap Enrique MD  
[facility] ([address] [place] [address]) [address] 
[address] Mouna Morales 81  
[phone]  Monday October 02, 2017  8:30 AM EDT  
 REMOTE DEVICE CHECK ORDERS ONLY ENCOUNTER with ABIGAIL REMOTE AICD 62 [facility] CARDIOLOGY [place] OFFICE ([address] [facility]) 2 Matte[address] Nguyen [address] Mouna KAURbart [address]  
[phone] Please remember THIS REMOTE CHECK IS COMPLETED FROM HOME - YOU WILL NOT COME TO THE OFFICE FOR THIS APPOINTMENT. In preparation for this check, please ensure your monitor box is working appropriately. If your monitor requires you to send a transmission, please make sure it is sent by 11:00AM on this day so we can have it processed and resulted to your doctor without delay. If you have a question or problem with the monitor box, please contact your respective company:   [address] [place] [address]/[employer] - [phone]  [employer] - [phone]  [employer] - [phone]  [employer]/[facility] - [phone]  If you have any further questions or need to move this appointment, we are happy to help and can be reached at [phone]. Current Discharge Medication List  
  
CONTINUE these medications which have NOT CHANGED Dose & Instructions Dispensing Information Comments Morning Noon Evening Bedtime  
 amiodarone 200 mg tablet Commonly known as:  CORDARONE Your next dose is:  8/19 Dose:  100 mg Take 0.5 Tabs by mouth every morning. Quantity:  15 Tab Refills:  11  
     
   
   
   
  
 aspirin delayed-release 81 mg tablet Your next dose is:  8/19 Dose:  81 mg Take 1 Tab by mouth daily. Quantity:  30 Tab Refills:  11  
     
   
   
   
  
 carvedilol 3.125 mg tablet Commonly known as:  Hartford Pennant Your next dose is:  8/19 Dose:  3.125 mg Take 1 Tab by mouth daily. Quantity:  30 Tab Refills:  11  
     
   
   
   
  
 cholecalciferol (VITAMIN D3) 5,000 unit Tab tablet Commonly known as:  VITAMIN D3 Your next dose is:  8/19 Dose:  5000 Units Take 1 Tab by mouth daily. Quantity:  90 Tab Refills:  3  
     
   
   
   
  
 clopidogrel 75 mg Tab Commonly known as:  PLAVIX Your next dose is:  8/19 Dose:  75 mg Take 1 Tab by mouth daily. Quantity:  30 Tab Refills:  11  
     
   
   
   
  
 ferrous sulfate 325 mg (65 mg iron) tablet Your next dose is:  8/19 Dose:  325 mg Take 1 Tab by mouth Daily (before breakfast). Quantity:  90 Tab Refills:  1  
     
   
   
   
  
 nitroglycerin 0.4 mg SL tablet Commonly known as:  NITROSTAT Your next dose is:  As needed Dose:  0.4 mg  
1 Tab by SubLINGual route every five (5) minutes as needed for Chest Pain. Quantity:  25 Tab Refills:  11 STOP taking these medications   
 furosemide 40 mg tablet Commonly known as:  LASIX  
   
  
 lisinopril 2.5 mg tablet Commonly known as:  Flora Plasencia Discharge Instructions DISCHARGE SUMMARY from Nurse The following personal items are in your possession at time of discharge: 
 
  
Visual Aid: Glasses Jewelry: None Other Valuables: None PATIENT INSTRUCTIONS: 
 
 
F-face looks uneven A-arms unable to move or move unevenly S-speech slurred or non-existent T-time-call 911 as soon as signs and symptoms begin-DO NOT go Back to bed or wait to see if you get better-TIME IS BRAIN. Warning Signs of HEART ATTACK Call 911 if you have these symptoms: 
? Chest discomfort.  Most heart attacks involve discomfort in the center of the chest that lasts more than a few minutes, or that goes away and comes back. It can feel like uncomfortable pressure, squeezing, fullness, or pain. ? Discomfort in other areas of the upper body. Symptoms can include pain or discomfort in one or both arms, the back, neck, jaw, or stomach. ? Shortness of breath with or without chest discomfort. ? Other signs may include breaking out in a cold sweat, nausea, or lightheadedness. Don't wait more than five minutes to call [address]! Fast action can save your life. Calling 911 is almost always the fastest way to get lifesaving treatment. Emergency Medical Services staff can begin treatment when they arrive  up to an hour sooner than if someone gets to the hospital by car. The discharge information has been reviewed with the patient. The patient verbalized understanding. Discharge medications reviewed with the patient and appropriate educational materials and side effects teaching were provided. Dehydration: Care Instructions Your Care Instructions Dehydration happens when your body loses too much fluid. This might happen when you do not drink enough water or you lose large amounts of fluids from your body because of diarrhea, vomiting, or sweating. Severe dehydration can be life-threatening. Water and minerals called electrolytes help put your body fluids back in balance. Learn the early signs of fluid loss, and drink more fluids to prevent dehydration. Follow-up care is a key part of your treatment and safety. Be sure to make and go to all appointments, and call your doctor if you are having problems. It's also a good idea to know your test results and keep a list of the medicines you take. How can you care for yourself at home? · To prevent dehydration, drink plenty of fluids, enough so that your urine is light yellow or clear like water. Choose water and other caffeine-free clear liquids until you feel better.  If you have kidney, heart, or liver disease and have to limit fluids, talk with your doctor before you increase the amount of fluids you drink. · If you do not feel like eating or drinking, try taking small sips of water, sports drinks, or other rehydration drinks. · Get plenty of rest. 
To prevent dehydration · Add more fluids to your diet and daily routine, unless your doctor has told you not to. · During hot weather, drink more fluids. Drink even more fluids if you exercise a lot. Stay away from drinks with alcohol or caffeine. · Watch for the symptoms of dehydration. These include: ¨ A dry, sticky mouth. ¨ Dark yellow urine, and not much of it. ¨ Dry and sunken eyes. ¨ Feeling very tired. · Learn what problems can lead to dehydration. These include: ¨ Diarrhea, fever, and vomiting. ¨ Any illness with a fever, such as pneumonia or the flu. ¨ Activities that cause heavy sweating, such as endurance races and heavy outdoor work in hot or humid weather. ¨ Alcohol or drug abuse or withdrawal. 
¨ Certain medicines, such as cold and allergy pills (antihistamines), diet pills (diuretics), and laxatives. ¨ Certain diseases, such as diabetes, cancer, and heart or kidney disease. When should you call for help? Call 911 anytime you think you may need emergency care. For example, call if: 
· You passed out (lost consciousness). Call your doctor now or seek immediate medical care if: 
· You are confused and cannot think clearly. · You are dizzy or lightheaded, or you feel like you may faint. · You have signs of needing more fluids. You have sunken eyes and a dry mouth, and you pass only a little dark urine. · You cannot keep fluids down. Watch closely for changes in your health, and be sure to contact your doctor if: 
· You are not making tears. · Your skin is very dry and sags slowly back into place after you pinch it. · Your mouth and eyes are very dry. Where can you learn more? Go to http://liu-keith.info/. Enter D532 in the search box to learn more about \"Dehydration: Care Instructions. \" Current as of: March 20, 2017 Content Version: 11.3 © 4977-4801 [employer]. Care instructions adapted under license by [employer] (which disclaims liability or warranty for this information). If you have questions about a medical condition or this instruction, always ask your healthcare professional. [place] [address] any warranty or liability for your use of this information. Discharge Orders None ACO Transitions of Care Introducing [employer] [address] Raeann Patiño offers a voluntary care coordination program to provide high quality service and care to [facility] fee-for-service beneficiaries. Gianfranco Mcdonald was designed to help you enhance your health and well-being through the following services: ? Transitions of Care  support for individuals who are transitioning from one care setting to another (example: Hospital to home). ? Chronic and Complex Care Coordination  support for individuals and caregivers of those with serious or chronic illnesses or with more than one chronic (ongoing) condition and those who take a number of different medications. If you meet specific medical criteria, a [facility] [address] may call you directly to coordinate your care with your primary care physician and your other care providers. For questions about the [facility] programs, please, contact your physicians office. For general questions or additional information about Accountable Care Organizations: 
Please visit www.medicare.gov/acos. html or call 1-800-MEDICARE (0-895.651.1758) TTY users should call 2-987.540.5537. Introducing [address]! Dear Joslyn Steinberg: Thank you for requesting a Medicine in Practice account.   Our records indicate that you already have an active Replenish account. You can access your account anytime at [email or web address]. [employer] Did you know that you can access your hospital and ER discharge instructions at any time in Replenish? You can also review all of your test results from your hospital stay or ER visit. Additional Information If you have questions, please visit the Frequently Asked Questions section of the Replenish website at [email or web address] [employer]/. Remember, Replenish is NOT to be used for urgent needs. For medical emergencies, dial 911. Now available from your iPhone and Android! General Information Please provide this summary of care documentation to your next provider. Patient Signature:  ____________________________________________________________ Date:  ____________________________________________________________  
  
Larri Hazard Provider Signature:  ____________________________________________________________ Date:  ____________________________________________________________

## 2017-08-17 NOTE — PROGRESS NOTES
Pt refusing vitals and labs this AM. Educated importance of labs and VS. She verbalized understanding and said she is leaving and will not have labs drawn.  Will report to gifty FINK

## 2017-08-17 NOTE — PROGRESS NOTES
Hospitalist Progress Note     Admit Date:  2017  6:49 PM   Name:  Jp Valadez   Age:  80 y.o.  :  7/15/1926   MRN:  [id number]   PCP:  Jason Knox MD  Treatment Team: Attending Provider: Zackary Sy MD; Utilization Review: Neno Machado RN    Subjective:   Ms. Robin Mcgregor is a 81 yo female who presented with weakness and diarrhea on a backgroudn of CHF, CKD and CAD. She reports that she had decreased PO intake over the past week. She was found to be hypotensive with ASHIA. She is currently on IV fluids.        Objective:   Patient Vitals for the past 24 hrs:   Temp Pulse Resp BP SpO2   17 1451 97.6 °F (36.4 °C) 70 20 94/40 92 %   17 1147 97.9 °F (36.6 °C) 69 20 108/46 93 %   17 0858 97.6 °F (36.4 °C) 71 - 110/40 94 %   17 0346 98.5 °F (36.9 °C) 77 20 116/58 93 %   17 2315 98.2 °F (36.8 °C) 70 22 109/53 90 %   17 2256 98.1 °F (36.7 °C) 70 24 114/58 95 %   17 -  24 116/56 94 %   17 -  24 115/54 95 %   17 24 111/54 93 %   17 24 112/53 91 %   17 24 107/55 94 %   17 24 116/55 93 %   17 (!) 39 107/55 93 %   17 28 101/55 93 %   17 (!) 41 110/52 94 %   17 28 116/53 93 %   17 (!) 40 93/54 92 %   17 23 (!) 88/46 93 %   08/16/17 1926 - 74 (!) 32 93/50 92 %   17 1838 97.6 °F (36.4 °C) 68 18 (!) 89/33 98 %     Oxygen Therapy  O2 Sat (%): 92 % (17 1451)  Pulse via Oximetry: 70 beats per minute (17 2229)  O2 Device: Room air (17 1428)    Intake/Output Summary (Last 24 hours) at 17 1525  Last data filed at 17 1147   Gross per 24 hour   Intake              480 ml   Output                0 ml   Net              480 ml           General appearance: NAD, conversant  Eyes: anicteric sclerae, moist conjunctivae; no lid-lag  ENT: Atraumatic; oropharynx clear with moist mucous membranes and no mucosal ulcerations; normal hard and soft palate  Neck: Trachea midline   FROM, supple, no thyromegaly or lymphadenopathy  Lungs: CTA, with normal respiratory effort and no intercostal retractions  CV: S1,S2 present, no added murmurs  Abdomen: Soft, non-tender; no masses   Extremities: No peripheral edema or extremity lymphadenopathy  Skin: Normal temperature, turgor and texture; no subcutaneous nodules  Psych: Appropriate affect, alert and oriented to person, place and time    Data Review:  I have reviewed all labs, meds, telemetry events, and studies from the last 24 hours. Recent Results (from the past 24 hour(s))   TROPONIN I    Collection Time: 08/16/17  6:48 PM   Result Value Ref Range    Troponin-I, Qt. 0.27 (HH) 0.02 - 0.05 NG/ML   CBC WITH AUTOMATED DIFF    Collection Time: 08/16/17  6:48 PM   Result Value Ref Range    WBC 8.4 4.3 - 11.1 K/uL    RBC 3.84 (L) 4.05 - 5.25 M/uL    HGB 12.7 11.7 - 15.4 g/dL    HCT 36.1 35.8 - 46.3 %    MCV 94.0 79.6 - 97.8 FL    MCH 33.1 (H) 26.1 - 32.9 PG    MCHC 35.2 (H) 31.4 - 35.0 g/dL    RDW 14.1 11.9 - 14.6 %    PLATELET 762 513 - 495 K/uL    MPV 11.0 10.8 - 14.1 FL    DF AUTOMATED      NEUTROPHILS 76 43 - 78 %    LYMPHOCYTES 11 (L) 13 - 44 %    MONOCYTES 12 4.0 - 12.0 %    EOSINOPHILS 1 0.5 - 7.8 %    BASOPHILS 0 0.0 - 2.0 %    IMMATURE GRANULOCYTES 0.1 0.0 - 5.0 %    ABS. NEUTROPHILS 6.3 1.7 - 8.2 K/UL    ABS. LYMPHOCYTES 0.9 0.5 - 4.6 K/UL    ABS. MONOCYTES 1.0 0.1 - 1.3 K/UL    ABS. EOSINOPHILS 0.1 0.0 - 0.8 K/UL    ABS. BASOPHILS 0.0 0.0 - 0.2 K/UL    ABS. IMM.  GRANS. 0.0 0.0 - 0.5 K/UL   METABOLIC PANEL, COMPREHENSIVE    Collection Time: 08/16/17  6:48 PM   Result Value Ref Range    Sodium 138 136 - 145 mmol/L    Potassium 3.3 (L) 3.5 - 5.1 mmol/L    Chloride 102 98 - 107 mmol/L    CO2 19 (L) 21 - 32 mmol/L    Anion gap 17 (H) 7 - 16 mmol/L    Glucose 127 (H) 65 - 100 mg/dL    BUN 55 (H) 8 - 23 MG/DL    Creatinine 2.87 (H) 0.6 - 1.0 MG/DL    GFR est AA 20 (L) >60 ml/min/1.73m2    GFR est non-AA 16 (L) >60 ml/min/1.73m2    Calcium 9.6 8.3 - 10.4 MG/DL    Bilirubin, total 0.4 0.2 - 1.1 MG/DL    ALT (SGPT) 29 12 - 65 U/L    AST (SGOT) 27 15 - 37 U/L    Alk. phosphatase 94 50 - 136 U/L    Protein, total 7.3 6.3 - 8.2 g/dL    Albumin 3.0 (L) 3.2 - 4.6 g/dL    Globulin 4.3 (H) 2.3 - 3.5 g/dL    A-G Ratio 0.7 (L) 1.2 - 3.5     MAGNESIUM    Collection Time: 08/16/17  6:48 PM   Result Value Ref Range    Magnesium 2.8 (H) 1.8 - 2.4 mg/dL   EKG, 12 LEAD, INITIAL    Collection Time: 08/16/17  7:02 PM   Result Value Ref Range    Ventricular Rate 69 BPM    Atrial Rate 69 BPM    P-R Interval 130 ms    QRS Duration 194 ms    Q-T Interval 524 ms    QTC Calculation (Bezet) 561 ms    Calculated P Axis 102 degrees    Calculated R Axis -171 degrees    Calculated T Axis 69 degrees    Diagnosis       !! AGE AND GENDER SPECIFIC ECG ANALYSIS !!   Normal sinus rhythm  atrial-sensed ventricular-paced complexes  Abnormal ECG  When compared with ECG of 17-JUL-2017 17:02,  Sinus rhythm has replaced Electronic ventricular pacemaker  Confirmed by Chaparro Alvarez MD (), TESFAYE ORTIZ ([id number]) on 8/17/2017 6:48:45 AM     POC GASTRIC OCCULT BLD-P    Collection Time: 08/16/17  8:21 PM   Result Value Ref Range    Gastric Occult Bld (POC) POSITIVE (A) NEG     METABOLIC PANEL, BASIC    Collection Time: 08/17/17  4:30 AM   Result Value Ref Range    Sodium 142 136 - 145 mmol/L    Potassium 3.2 (L) 3.5 - 5.1 mmol/L    Chloride 106 98 - 107 mmol/L    CO2 20 (L) 21 - 32 mmol/L    Anion gap 16 7 - 16 mmol/L    Glucose 132 (H) 65 - 100 mg/dL    BUN 57 (H) 8 - 23 MG/DL    Creatinine 2.86 (H) 0.6 - 1.0 MG/DL    GFR est AA 20 (L) >60 ml/min/1.73m2    GFR est non-AA 16 (L) >60 ml/min/1.73m2    Calcium 9.0 8.3 - 10.4 MG/DL   CBC WITH AUTOMATED DIFF    Collection Time: 08/17/17  4:30 AM   Result Value Ref Range    WBC 6.5 4.3 - 11.1 K/uL    RBC 3.52 (L) 4.05 - 5.25 M/uL    HGB 11.1 (L) 11.7 - 15.4 g/dL HCT 33.3 (L) 35.8 - 46.3 %    MCV 94.6 79.6 - 97.8 FL    MCH 31.5 26.1 - 32.9 PG    MCHC 33.3 31.4 - 35.0 g/dL    RDW 14.2 11.9 - 14.6 %    PLATELET 964 272 - 959 K/uL    MPV 11.0 10.8 - 14.1 FL    DF AUTOMATED      NEUTROPHILS 70 43 - 78 %    LYMPHOCYTES 8 (L) 13 - 44 %    MONOCYTES 20 (H) 4.0 - 12.0 %    EOSINOPHILS 2 0.5 - 7.8 %    BASOPHILS 0 0.0 - 2.0 %    IMMATURE GRANULOCYTES 0.3 0.0 - 5.0 %    ABS. NEUTROPHILS 4.6 1.7 - 8.2 K/UL    ABS. LYMPHOCYTES 0.6 0.5 - 4.6 K/UL    ABS. MONOCYTES 1.3 0.1 - 1.3 K/UL    ABS. EOSINOPHILS 0.1 0.0 - 0.8 K/UL    ABS. BASOPHILS 0.0 0.0 - 0.2 K/UL    ABS. IMM.  GRANS. 0.0 0.0 - 0.5 K/UL   MAGNESIUM    Collection Time: 08/17/17  4:30 AM   Result Value Ref Range    Magnesium 2.6 (H) 1.8 - 2.4 mg/dL   URIC ACID    Collection Time: 08/17/17  4:30 AM   Result Value Ref Range    Uric acid 11.2 (H) 2.6 - 6.0 MG/DL   PTH INTACT    Collection Time: 08/17/17  4:30 AM   Result Value Ref Range    Calcium 9.1 8.3 - 10.4 MG/DL    PTH, Intact 52.4 14.0 - 72.0 pg/mL   TROPONIN I    Collection Time: 08/17/17 11:14 AM   Result Value Ref Range    Troponin-I, Qt. 0.16 (HH) 0.02 - 0.05 NG/ML   URINALYSIS W/ RFLX MICROSCOPIC    Collection Time: 08/17/17 11:24 AM   Result Value Ref Range    Color YELLOW      Appearance TURBID      Specific gravity 1.014 1.001 - 1.023      pH (UA) 5.5 5.0 - 9.0      Protein TRACE (A) NEG mg/dL    Glucose NEGATIVE  mg/dL    Ketone NEGATIVE  NEG mg/dL    Bilirubin NEGATIVE  NEG      Blood MODERATE (A) NEG      Urobilinogen 0.2 0.2 - 1.0 EU/dL    Nitrites NEGATIVE  NEG      Leukocyte Esterase LARGE (A) NEG      WBC >100 (H) 0 /hpf    RBC 0-3 0 /hpf    Epithelial cells 0 0 /hpf    Bacteria 1+ (H) 0 /hpf    Casts 0-3 0 /lpf   SODIUM, UR, RANDOM    Collection Time: 08/17/17 11:24 AM   Result Value Ref Range    Sodium urine, random 16 MMOL/L   CREATININE, UR, RANDOM    Collection Time: 08/17/17 11:24 AM   Result Value Ref Range    Creatinine, urine 105.00 mg/dL        All Micro Results     Procedure Component Value Units Date/Time    CULTURE, URINE [[id number]] Collected:  08/16/17 1124    Order Status:  Completed Specimen:  Urine from Clean catch Updated:  08/17/17 1257    C. DIFFICILE/EPI PCR [[id number]]     Order Status:  Sent Specimen:  Stool           Current Meds:  Current Facility-Administered Medications   Medication Dose Route Frequency    sodium chloride (NS) flush 5-10 mL  5-10 mL IntraVENous Q8H    sodium chloride (NS) flush 5-10 mL  5-10 mL IntraVENous PRN    acetaminophen (TYLENOL) tablet 650 mg  650 mg Oral Q4H PRN    HYDROcodone-acetaminophen (NORCO) 5-325 mg per tablet 1 Tab  1 Tab Oral Q4H PRN    naloxone (NARCAN) injection 0.4 mg  0.4 mg IntraVENous PRN    diphenhydrAMINE (BENADRYL) injection 12.5 mg  12.5 mg IntraVENous Q4H PRN    ondansetron (ZOFRAN) injection 4 mg  4 mg IntraVENous Q4H PRN    LORazepam (ATIVAN) tablet 0.5 mg  0.5 mg Oral Q4H PRN    zolpidem (AMBIEN) tablet 5 mg  5 mg Oral QHS PRN    heparin (porcine) injection 5,000 Units  5,000 Units SubCUTAneous Q8H    0.9% sodium chloride infusion  75 mL/hr IntraVENous CONTINUOUS    amiodarone (CORDARONE) tablet 100 mg  100 mg Oral 7am    aspirin delayed-release tablet 81 mg  81 mg Oral DAILY    carvedilol (COREG) tablet 3.125 mg  3.125 mg Oral DAILY    cholecalciferol (VITAMIN D3) tablet 5,000 Units  5,000 Units Oral DAILY    clopidogrel (PLAVIX) tablet 75 mg  75 mg Oral DAILY    ferrous sulfate tablet 325 mg  325 mg Oral ACB    nitroglycerin (NITROSTAT) tablet 0.4 mg  0.4 mg SubLINGual Q5MIN PRN       Other Studies (last 24 hours):  Xr Chest Port    Result Date: 8/16/2017  History: Chest pain and left upper arm pain. Exam: portable chest Comparison: 1/13/2017 Findings: A pacemaker overlies left chest wall. There is no focal alveolar infiltrate or pleural effusion. The patient is rotated to the left. Impressions: No acute abnormality.  Stable exam.       Assessment and Plan:     Hospital Problems as of 8/17/2017  Date Reviewed: 7/20/2017          Codes Class Noted - Resolved POA    Hypotension ICD-10-CM: I95.9  ICD-9-CM: 458.9  5/24/2017 - Present Unknown              PLAN:    Weakness  Likely secondary to dehydration, diarrhea (diarrhea now resolved)  Plan  Orthostatic vitals  Continue IV fluids    ASHIA  Likely pre-renal given BUN/Cr ratio, clinically dehydrated  Plan  Continue IV fluids  Repeat Chem 7 in the AM    DC planning/Dispo:  Home  DVT ppx:  SCD    Signed:  Angel Luis Lopez MD

## 2017-08-17 NOTE — H&P
History and Physical    Patient: Boaz Guerrero MRN: [id number]  SSN: [id number]    YOB: 1926  Age: 80 y.o. Sex: female      Subjective:      Boaz Guerrero is a 80 y.o. female who presents with hypotension, weakness, and diarrhea for one day. She is accompanied by her granddaughter-in-law and her great-granddaughter in ED at time of exam. She reports feeling weaker as day progressed. They discussed her symptoms over the phone with their cardiologist, who (they report) advised coming to ED. She denies chest pain, pressure, or palpitations. She denies dyspnea. She does report dysuria. No fevers. Appetite has been poor.      Past Medical History:   Diagnosis Date    AICD (automatic cardioverter/defibrillator) present     Anemia 10/25/2012    Aortic valve regurgitation     Arrhythmia     blessing, pacemaker, a fib    CAD (coronary artery disease) 9/4/2012    stent    CHF (congestive heart failure) ([address] 9/4/2012    CHF (congestive heart failure) ([address] 9/4/2012    Chronic kidney disease, stage II (mild)     Chronic systolic heart failure ([address] 4/16/2013    Diverticulitis     Encounter for long-term (current) use of other medications 11/19/2012    Esophagitis 2010    EGD    Falls     Femur fracture, left ([address] 12/02/2016    Fracture of left radius 12/02/2016    left radius and ulna fx    Fracture of wrist 2003    left/pins    HLD (hyperlipidemia) 9/4/2012    HTN (hypertension) 9/4/2012    pt denies    Insomnia     Ischemic cardiomyopathy, chronic     Left bundle branch block (LBBB)     Osteoporosis 9/4/2012    S/P PTCA (percutaneous transluminal coronary angioplasty)     Sigmoid stricture (HCC)     Dr. Souleymane Flores    Stiffness in joint     left hip, left wrist    Varicose vein 2011    surg/ Dr. Salvatore Larkin     Past Surgical History:   Procedure Laterality Date    HX CYST REMOVAL  2009    right hand    HX GYN      hysterectomy, bladder sling    HX HIP REPLACEMENT      after fracture  HX IMPLANTABLE CARDIOVERTER DEFIBRILLATOR      HX ORTHOPAEDIC      lt hand    HX PTCA  2012      History reviewed. No pertinent family history. Social History   Substance Use Topics    Smoking status: Never Smoker    Smokeless tobacco: Never Used    Alcohol use Yes      Comment: occasional      Prior to Admission medications    Medication Sig Start Date End Date Taking? Authorizing Provider   amiodarone (CORDARONE) 200 mg tablet Take 0.5 Tabs by mouth every morning. 5/11/17   Kimi Espinal MD   carvedilol (COREG) 3.125 mg tablet Take 1 Tab by mouth daily. 5/11/17   Kimi Espinal MD   furosemide (LASIX) 40 mg tablet Take 1 tablet daily 5 days per week and 1 tablet 2 times daily 2 days per week. Patient taking differently: 40 mg daily. Take 1 tablet daily 5 days per week and 1 tablet 2 times daily 2 days per week. 4/10/17   Kimi Espinal MD   lisinopril (PRINIVIL, ZESTRIL) 2.5 mg tablet Take 1 Tab by mouth daily. 4/6/17   Kimi Espinal MD   clopidogrel (PLAVIX) 75 mg tab Take 1 Tab by mouth daily. 4/6/17   Kimi Espinal MD   cholecalciferol, VITAMIN D3, (VITAMIN D3) 5,000 unit tab tablet Take 1 Tab by mouth daily. 4/3/17   Mando Tom MD   ferrous sulfate 325 mg (65 mg iron) tablet Take 1 Tab by mouth Daily (before breakfast). 4/3/17   Mando Tom MD   aspirin delayed-release 81 mg tablet Take 1 Tab by mouth daily. 3/6/17   Kimi Espinal MD   nitroglycerin (NITROSTAT) 0.4 mg SL tablet 1 Tab by SubLINGual route every five (5) minutes as needed for Chest Pain. 4/22/16   Kimi Espinal MD        Allergies   Allergen Reactions    Pcn [Penicillins] Unknown (comments)     Tesfaye Vargas at [facility] states no allergies listed       Review of Systems:  A comprehensive review of systems was negative except for that written in the History of Present Illness.     Objective:     Vitals:    08/16/17 1838   BP: (!) 89/33   Pulse: 68   Resp: 18   Temp: 97.6 °F (36.4 °C)   SpO2: 98%   Weight: 51.7 kg (114 lb)   Height: 5' 3\" (1.6 m)        Physical Exam:  General:  Alert, cooperative, no distress, appears stated age. Eyes:  Conjunctivae/corneas clear. PERRL, EOMs intact. Fundi benign   Ears:  Normal TMs and external ear canals both ears. Nose: Nares normal. Septum midline. Mucosa normal. No drainage or sinus tenderness. Mouth/Throat: Lips, mucosa, and tongue normal. Teeth and gums normal.   Neck: Supple, symmetrical, trachea midline, no adenopathy, thyroid: no enlargment/tenderness/nodules, no carotid bruit and no JVD. Back:   Symmetric, no curvature. ROM normal. No CVA tenderness. Lungs:   Clear to auscultation bilaterally. Heart:  Regular rate and rhythm, S1, S2 normal, no murmur, click, rub or gallop. Abdomen:   Soft, non-tender. Bowel sounds normal. No masses,  No organomegaly. Extremities: Extremities normal, atraumatic, no cyanosis or edema. Pulses: 2+ and symmetric all extremities. Skin: Skin color, texture, turgor normal. No rashes or lesions   Lymph nodes: Cervical, supraclavicular, and axillary nodes normal.   Neurologic: CNII-XII intact. Normal strength, sensation and reflexes throughout. Assessment:   Hospital Problems  Date Reviewed: 7/20/2017          Codes Class Noted POA    Hypotension ICD-10-CM: I95.9  ICD-9-CM: 458.9  5/24/2017 Unknown              Plan:     1) Hypotension- likely from dehydration from diarrhea and reduced PO intake. BP improved with IV saline bolus. Will continue modest IV replacement fluids, monitor vitals. Holding outpatient Lasix. 2) Diarrhea- etiology unclear; verbal report of weakly + FOBT in ED. She is on oral iron. I've ordered C diff and fecal WBC testing, given recent use of antibiotics for UTI. 3) Dysuria- checking urinalysis and culture. 4) Hypokalemia- replacing with IV KCl; holding lisinopril (see below). 5) Acute Kidney Injury- creatinine up to 2.87; has baseline CKD3, non-progressive. IV hydration ordered.  Checking urine sample and FeNa. Holding lisinopril. 6) CHF- by history; clinically stable. Continue Coreg; holding lasix and lisinopril. 7) FENGI- cardiac diet. 8) DVT prophylaxis- sub Q heparin. 9) Code Status- FULL CODE.     Signed By: Zackary Sy MD     August 16, 2017

## 2017-08-17 NOTE — PROGRESS NOTES
TRANSFER - IN REPORT:    Verbal report received from Evelina NEELY on Michelle Guess  being received from ED for routine progression of care      Report consisted of patients Situation, Background, Assessment and   Recommendations(SBAR). Information from the following report(s) SBAR, Kardex and ED Summary was reviewed with the receiving nurse. Opportunity for questions and clarification was provided. Assessment completed upon patients arrival to unit and care assumed.

## 2017-08-17 NOTE — PROGRESS NOTES
Assured patient of he availability of pastoral care during hospitalization  Patient expressed appreciation for  visit    Frank Landis III, PhD  Melanie Pop  [phone]

## 2017-08-17 NOTE — ED PROVIDER NOTES
HPI Comments: Presents complaint of multiple episodes of diarrhea and low blood pressure. Family reports her blood pressure is been low all day and they called the cardiologist and was told to come here for An evaluation. Patient denies any chest pain or shortness of breath. She intermittently feels nauseated but no vomiting. She denies any abdominal pain. Patient is a 80 y.o. female presenting with hypotension. The history is provided by the patient and a relative. Hypotension    This is a new problem. The current episode started 6 to 12 hours ago. The problem has not changed since onset. Associated symptoms include weakness. Mental status baseline is normal.  Risk factors include a recent illness. Her past medical history is significant for heart disease.         Past Medical History:   Diagnosis Date    AICD (automatic cardioverter/defibrillator) present     Anemia 10/25/2012    Aortic valve regurgitation     Arrhythmia     blessing, pacemaker, a fib    CAD (coronary artery disease) 9/4/2012    stent    CHF (congestive heart failure) ([address] 9/4/2012    CHF (congestive heart failure) ([address] 9/4/2012    Chronic kidney disease, stage II (mild)     Chronic systolic heart failure ([address] 4/16/2013    Diverticulitis     Encounter for long-term (current) use of other medications 11/19/2012    Esophagitis 2010    EGD    Falls     Femur fracture, left ([address] 12/02/2016    Fracture of left radius 12/02/2016    left radius and ulna fx    Fracture of wrist 2003    left/pins    HLD (hyperlipidemia) 9/4/2012    HTN (hypertension) 9/4/2012    pt denies    Insomnia     Ischemic cardiomyopathy, chronic     Left bundle branch block (LBBB)     Osteoporosis 9/4/2012    S/P PTCA (percutaneous transluminal coronary angioplasty)     Sigmoid stricture (HCC)     Dr. Yoder [place]    Stiffness in joint     left hip, left wrist    Varicose vein 2011    surg/ Dr. Natalie Ojeda       Past Surgical History:   Procedure Laterality Date    HX CYST REMOVAL  2009    right hand    HX GYN      hysterectomy, bladder sling    HX HIP REPLACEMENT      after fracture    HX IMPLANTABLE CARDIOVERTER DEFIBRILLATOR      HX ORTHOPAEDIC      lt hand    HX PTCA  2012         History reviewed. No pertinent family history. Social History     Social History    Marital status: SINGLE     Spouse name: N/A    Number of children: N/A    Years of education: N/A     Occupational History    Not on file. Social History Main Topics    Smoking status: Never Smoker    Smokeless tobacco: Never Used    Alcohol use Yes      Comment: occasional    Drug use: No    Sexual activity: Not on file     Other Topics Concern    Not on file     Social History Narrative    /no living children         ALLERGIES: Pcn [penicillins]    Review of Systems   Constitutional: Negative for chills and fever. Respiratory: Negative for shortness of breath. Cardiovascular: Negative for chest pain. Neurological: Positive for weakness. All other systems reviewed and are negative. Vitals:    08/16/17 1838   BP: (!) 89/33   Pulse: 68   Resp: 18   Temp: 97.6 °F (36.4 °C)   SpO2: 98%   Weight: 51.7 kg (114 lb)   Height: 5' 3\" (1.6 m)            Physical Exam   Constitutional: She is oriented to person, place, and time. She appears well-developed and well-nourished. No distress. HENT:   Head: Normocephalic and atraumatic. Neck: Normal range of motion. Neck supple. Cardiovascular: Normal rate and regular rhythm. Pulmonary/Chest: Effort normal and breath sounds normal. No respiratory distress. She has no wheezes. She has no rales. Abdominal: Soft. She exhibits no distension. There is no tenderness. There is no rebound and no guarding. Musculoskeletal: Normal range of motion. Neurological: She is alert and oriented to person, place, and time. No cranial nerve deficit. Skin: Skin is warm and dry. No rash noted. She is not diaphoretic. No erythema. Psychiatric: She has a normal mood and affect. Her behavior is normal.   Nursing note and vitals reviewed. MDM  Number of Diagnoses or Management Options  ASHIA (acute kidney injury) ([facility]):   Diarrhea, unspecified type:   Elevated troponin:   Diagnosis management comments: Patient with acute kidney injury and anion gap acidosis. Blood pressure improved with some fluid resuscitation. Admit to the hospitalists. I suspect her troponin elevation is due to demand ischemia. EKG shows a paced rhythm.        Amount and/or Complexity of Data Reviewed  Clinical lab tests: ordered and reviewed  Review and summarize past medical records: yes  Discuss the patient with other providers: yes  Independent visualization of images, tracings, or specimens: yes    Risk of Complications, Morbidity, and/or Mortality  Presenting problems: high  Diagnostic procedures: moderate  Management options: high    Patient Progress  Patient progress: improved    ED Course       Procedures

## 2017-08-17 NOTE — PROGRESS NOTES
Dual skin assessment with Siobhan Burgos RN, Pt skin is intact with no skin breakdown. Skin fragile and flaky. Pt denies any needs at this time.  Call light within reach

## 2017-08-18 NOTE — PROGRESS NOTES
Hospitalist Progress Note     Admit Date:  2017  6:49 PM   Name:  Cachorro Thibodeaux   Age:  80 y.o.  :  7/15/1926   MRN:  [id number]   PCP:  Nasreen Del Toro MD      Subjective:   Ms. Keo Greer is a 79 yo female who presented with weakness and diarrhea on a backgroudn of CHF, CKD and CAD. She reports that she had decreased PO intake over the past week. She was found to be hypotensive with ASHIA. She is currently on IV fluids. Objective:     Patient Vitals for the past 24 hrs:   Temp Pulse Resp BP SpO2   17 0714 97.4 °F (36.3 °C) 69 16 132/52 95 %   17 0348 97.7 °F (36.5 °C) 70 16 113/49 95 %   17 2330 97.8 °F (36.6 °C) 70 19 106/57 92 %   17 1954 97.4 °F (36.3 °C) 70 18 109/59 92 %   17 1451 97.6 °F (36.4 °C) 70 20 94/40 92 %   17 1147 97.9 °F (36.6 °C) 69 20 108/46 93 %     Oxygen Therapy  O2 Sat (%): 95 % (17 0714)  Pulse via Oximetry: 70 beats per minute (17 2229)  O2 Device: Room air (17 1428)    Intake/Output Summary (Last 24 hours) at 17 1036  Last data filed at 17 0921   Gross per 24 hour   Intake             1242 ml   Output                0 ml   Net             1242 ml           General appearance: NAD, conversant  Eyes: anicteric sclerae, moist conjunctivae; no lid-lag  ENT: Atraumatic; oropharynx clear with moist mucous membranes and no mucosal ulcerations; normal hard and soft palate  Neck: Trachea midline   FROM, supple, no thyromegaly or lymphadenopathy  Lungs: CTA, with normal respiratory effort and no intercostal retractions  CV: S1,S2 present, no added murmurs  Abdomen: Soft, non-tender; no masses   Extremities: No peripheral edema or extremity lymphadenopathy  Skin: Normal temperature, turgor and texture; no subcutaneous nodules  Psych: Appropriate affect, alert and oriented to person, place and time    Data Review:  I have reviewed all labs, meds, telemetry events, and studies from the last 24 hours.     Recent Results (from the past 24 hour(s))   TROPONIN I    Collection Time: 08/17/17 11:14 AM   Result Value Ref Range    Troponin-I, Qt. 0.16 (HH) 0.02 - 0.05 NG/ML   URINALYSIS W/ RFLX MICROSCOPIC    Collection Time: 08/17/17 11:24 AM   Result Value Ref Range    Color YELLOW      Appearance TURBID      Specific gravity 1.014 1.001 - 1.023      pH (UA) 5.5 5.0 - 9.0      Protein TRACE (A) NEG mg/dL    Glucose NEGATIVE  mg/dL    Ketone NEGATIVE  NEG mg/dL    Bilirubin NEGATIVE  NEG      Blood MODERATE (A) NEG      Urobilinogen 0.2 0.2 - 1.0 EU/dL    Nitrites NEGATIVE  NEG      Leukocyte Esterase LARGE (A) NEG      WBC >100 (H) 0 /hpf    RBC 0-3 0 /hpf    Epithelial cells 0 0 /hpf    Bacteria 1+ (H) 0 /hpf    Casts 0-3 0 /lpf   SODIUM, UR, RANDOM    Collection Time: 08/17/17 11:24 AM   Result Value Ref Range    Sodium urine, random 16 MMOL/L   CREATININE, UR, RANDOM    Collection Time: 08/17/17 11:24 AM   Result Value Ref Range    Creatinine, urine 562.52 mg/dL   METABOLIC PANEL, BASIC    Collection Time: 08/18/17  4:50 AM   Result Value Ref Range    Sodium 145 136 - 145 mmol/L    Potassium 3.6 3.5 - 5.1 mmol/L    Chloride 112 (H) 98 - 107 mmol/L    CO2 21 21 - 32 mmol/L    Anion gap 12 7 - 16 mmol/L    Glucose 96 65 - 100 mg/dL    BUN 46 (H) 8 - 23 MG/DL    Creatinine 1.87 (H) 0.6 - 1.0 MG/DL    GFR est AA 32 (L) >60 ml/min/1.73m2    GFR est non-AA 27 (L) >60 ml/min/1.73m2    Calcium 8.9 8.3 - 10.4 MG/DL   CBC WITH AUTOMATED DIFF    Collection Time: 08/18/17  4:50 AM   Result Value Ref Range    WBC 5.7 4.3 - 11.1 K/uL    RBC 3.28 (L) 4.05 - 5.25 M/uL    HGB 10.5 (L) 11.7 - 15.4 g/dL    HCT 31.5 (L) 35.8 - 46.3 %    MCV 96.0 79.6 - 97.8 FL    MCH 32.0 26.1 - 32.9 PG    MCHC 33.3 31.4 - 35.0 g/dL    RDW 14.2 11.9 - 14.6 %    PLATELET 105 390 - 322 K/uL    MPV 10.9 10.8 - 14.1 FL    DF AUTOMATED      NEUTROPHILS 71 43 - 78 %    LYMPHOCYTES 13 13 - 44 %    MONOCYTES 13 (H) 4.0 - 12.0 %    EOSINOPHILS 2 0.5 - 7.8 %    BASOPHILS 0 0.0 - 2.0 %    IMMATURE GRANULOCYTES 0.7 0.0 - 5.0 %    ABS. NEUTROPHILS 4.0 1.7 - 8.2 K/UL    ABS. LYMPHOCYTES 0.8 0.5 - 4.6 K/UL    ABS. MONOCYTES 0.7 0.1 - 1.3 K/UL    ABS. EOSINOPHILS 0.1 0.0 - 0.8 K/UL    ABS. BASOPHILS 0.0 0.0 - 0.2 K/UL    ABS. IMM.  GRANS. 0.0 0.0 - 0.5 K/UL        All Micro Results     Procedure Component Value Units Date/Time    CULTURE, URINE [[id number]]  (Abnormal) Collected:  08/16/17 1124    Order Status:  Completed Specimen:  Urine from Clean catch Updated:  08/18/17 0744     Special Requests: NO SPECIAL REQUESTS        Culture result:         >100,000 COLONIES/mL GRAM NEGATIVE RODS SUBCULTURE IN PROGRESS (A)    C. DIFFICILE/EPI PCR [[id number]]     Order Status:  Sent Specimen:  Stool           Current Meds:  Current Facility-Administered Medications   Medication Dose Route Frequency    lactated Ringers infusion  75 mL/hr IntraVENous CONTINUOUS    lactated ringers bolus infusion 250 mL  250 mL IntraVENous ONCE    sodium chloride (NS) flush 5-10 mL  5-10 mL IntraVENous Q8H    sodium chloride (NS) flush 5-10 mL  5-10 mL IntraVENous PRN    acetaminophen (TYLENOL) tablet 650 mg  650 mg Oral Q4H PRN    HYDROcodone-acetaminophen (NORCO) 5-325 mg per tablet 1 Tab  1 Tab Oral Q4H PRN    naloxone (NARCAN) injection 0.4 mg  0.4 mg IntraVENous PRN    diphenhydrAMINE (BENADRYL) injection 12.5 mg  12.5 mg IntraVENous Q4H PRN    ondansetron (ZOFRAN) injection 4 mg  4 mg IntraVENous Q4H PRN    LORazepam (ATIVAN) tablet 0.5 mg  0.5 mg Oral Q4H PRN    zolpidem (AMBIEN) tablet 5 mg  5 mg Oral QHS PRN    heparin (porcine) injection 5,000 Units  5,000 Units SubCUTAneous Q8H    amiodarone (CORDARONE) tablet 100 mg  100 mg Oral 7am    aspirin delayed-release tablet 81 mg  81 mg Oral DAILY    carvedilol (COREG) tablet 3.125 mg  3.125 mg Oral DAILY    cholecalciferol (VITAMIN D3) tablet 5,000 Units  5,000 Units Oral DAILY    clopidogrel (PLAVIX) tablet 75 mg  75 mg Oral DAILY    ferrous sulfate tablet 325 mg  325 mg Oral ACB    nitroglycerin (NITROSTAT) tablet 0.4 mg  0.4 mg SubLINGual Q5MIN PRN     Current Outpatient Prescriptions   Medication Sig    amiodarone (CORDARONE) 200 mg tablet Take 0.5 Tabs by mouth every morning.  carvedilol (COREG) 3.125 mg tablet Take 1 Tab by mouth daily.  clopidogrel (PLAVIX) 75 mg tab Take 1 Tab by mouth daily.  cholecalciferol, VITAMIN D3, (VITAMIN D3) 5,000 unit tab tablet Take 1 Tab by mouth daily.  ferrous sulfate 325 mg (65 mg iron) tablet Take 1 Tab by mouth Daily (before breakfast).  aspirin delayed-release 81 mg tablet Take 1 Tab by mouth daily.  nitroglycerin (NITROSTAT) 0.4 mg SL tablet 1 Tab by SubLINGual route every five (5) minutes as needed for Chest Pain. Other Studies (last 24 hours):  No results found.     Assessment and Plan:     Hospital Problems as of 8/18/2017  Date Reviewed: 7/20/2017          Codes Class Noted - Resolved POA    Hypotension ICD-10-CM: I95.9  ICD-9-CM: 458.9  5/24/2017 - Present Unknown              PLAN:    Weakness  Likely secondary to dehydration, diarrhea (diarrhea now resolved)  Resolved with IV fluids  Plan  Discharge home today    ASHIA  Likely pre-renal given BUN/Cr ratio, clinically dehydrated  Plan  Improving with IV fluids  Discharge home today with PCP follow up for repeat Chem 7    DC planning/Dispo:  Home  DVT ppx:  SCD    Signed:  Erika Johnson MD

## 2017-08-18 NOTE — PROGRESS NOTES
Patient feeling good today, states she is ready to go home.  Needs met, hourly rounds done, will monitor

## 2017-08-18 NOTE — PROGRESS NOTES
Hourly rounds completed on pt this shift. Will report to Nightshift RN. Pt trop trending down today from 0.27 to 0.16. Pt with no cp or SOB. Urine NA sent down and WNL. No stool sample obtained today. UA sent down with trace protein, mod blood, large leukocytes, and 1 + bacteria. Pt tolerated diet and meds well. IV placed in left arm. Pt ambulatory and SQ heparin stopped and changed to SCD. Pt rested quietly throughout shift.  Will continue to monitor and report to nightshift RN

## 2017-08-18 NOTE — DISCHARGE SUMMARY
Hospitalist Discharge Summary     Admit Date:  2017  6:49 PM   Name:  Chanel Clark   Age:  80 y.o.  :  7/15/1926   MRN:  [id number]   PCP:  Elaine Diaz MD      Problem List for this Hospitalization:  Hospital Problems as of 2017  Date Reviewed: 2017          Codes Class Noted - Resolved POA    Hypotension ICD-10-CM: I95.9  ICD-9-CM: 458.9  2017 - Present Unknown                Admission HPI from 2017:    \" Chanel Clark is a 80 y.o. female who presents with hypotension, weakness, and diarrhea for one day. She is accompanied by her granddaughter-in-law and her great-granddaughter in ED at time of exam. She reports feeling weaker as day progressed. They discussed her symptoms over the phone with their cardiologist, who (they report) advised coming to ED. She denies chest pain, pressure, or palpitations. She denies dyspnea. She does report dysuria. No fevers. Appetite has been poor. \"    Hospital Course:  Ms. Gene Lara is a 81 yo female who presented with weakness and diarrhea on a background of CHF, CKD and CAD. She reports that she had decreased PO intake and weakness for 1 week. She was found to be hypotensive with ASHIA. Her creatinine improved inpatient with IV fluids and her symptoms completely resolved. She was discharged home on  with PCP follow up within 1 week (Cr improved from 2.8 to 1.87 inpatient). She was encouraged to improve hydration and home and will need repeat Chem 7 in 1 week. Lasix and lisinopril  were held inpatient in light of ASHIA and can be restarted during follow up with Cr is at baseline.       Follow up instructions below. Plan was discussed with the patient had her granddaughter. All questions answered. Patient was stable at time of discharge and was instructed to call or return if there are any concerns or recurrence of symptoms. Diagnostic Imaging/Tests:   Xr Chest Port    Result Date: 2017  History: Chest pain and left upper arm pain. Exam: portable chest Comparison: 1/13/2017 Findings: A pacemaker overlies left chest wall. There is no focal alveolar infiltrate or pleural effusion. The patient is rotated to the left. Impressions: No acute abnormality. Stable exam.       Echocardiogram results:  No results found for this visit on 08/16/17.       All Micro Results     Procedure Component Value Units Date/Time    CULTURE, URINE [[id number]]  (Abnormal) Collected:  08/16/17 1124    Order Status:  Completed Specimen:  Urine from Clean catch Updated:  08/18/17 0744     Special Requests: NO SPECIAL REQUESTS        Culture result:         >100,000 COLONIES/mL GRAM NEGATIVE RODS SUBCULTURE IN PROGRESS (A)    C. DIFFICILE/EPI PCR [[id number]]     Order Status:  Sent Specimen:  Stool           Labs: Results:       BMP, Mg, Phos Recent Labs      08/18/17 0450 08/17/17   0430  08/16/17 1848   NA  145  142  138   K  3.6  3.2*  3.3*   CL  112*  106  102   CO2  21  20*  19*   AGAP  12  16  17*   BUN  46*  57*  55*   CREA  1.87*  2.86*  2.87*   CA  8.9  9.1  9.0  9.6   GLU  96  132*  127*   MG   --   2.6*  2.8*      CBC Recent Labs      08/18/17 0450 08/17/17   0430  08/16/17 1848   WBC  5.7  6.5  8.4   RBC  3.28*  3.52*  3.84*   HGB  10.5*  11.1*  12.7   HCT  31.5*  33.3*  36.1   PLT  212  193  215   GRANS  71  70  76   LYMPH  13  8*  11*   EOS  2  2  1   MONOS  13*  20*  12   BASOS  0  0  0   IG  0.7  0.3  0.1   ANEU  4.0  4.6  6.3   ABL  0.8  0.6  0.9   SHANNON  0.1  0.1  0.1   ABM  0.7  1.3  1.0   ABB  0.0  0.0  0.0   AIG  0.0  0.0  0.0      LFT Recent Labs      08/16/17 1848   SGOT  27   ALT  29   AP  94   TP  7.3   ALB  3.0*   GLOB  4.3*   AGRAT  0.7*      Cardiac Testing Lab Results   Component Value Date/Time    BNP 2047 01/14/2017 05:05 AM    BNP 1292 03/22/2016 10:31 AM    BNP 2061 07/12/2013 11:49 AM    CK 89 02/08/2013 01:45 AM    CK - MB 2.4 02/08/2013 01:45 AM    CK-MB Index 2.7 02/08/2013 01:45 AM    Troponin-I, Qt. 0.16 08/17/2017 11:14 AM Troponin-I, Qt. 0.27 08/16/2017 06:48 PM      Coagulation Tests Lab Results   Component Value Date/Time    Prothrombin time 10.3 04/16/2013 01:00 PM    Prothrombin time 10.4 03/21/2013 11:28 AM    Prothrombin time 11.4 02/08/2013 01:45 AM    INR 1.0 04/16/2013 01:00 PM    INR 1.0 03/21/2013 11:28 AM    INR 1.1 02/08/2013 01:45 AM    aPTT 25.7 04/16/2013 01:00 PM    aPTT 25.8 03/21/2013 11:28 AM    aPTT 25.0 02/08/2013 01:45 AM      A1c No results found for: HBA1C, HGBE8, [id number]   Lipid Panel No results found for: CHOL, CHOLPOCT, CHOLX, CHLST, CHOLV, 090930, HDL, LDL, LDLC, DLDLP, 884187, VLDLC, VLDL, TGLX, TRIGL, TRIGP, TGLPOCT, [facility], [facility]   Thyroid Panel Lab Results   Component Value Date/Time    T4, Total 8.5 03/22/2016 10:31 AM    TSH 1.740 04/03/2017 05:04 PM    TSH 1.400 03/22/2016 10:31 AM        Most Recent UA Lab Results   Component Value Date/Time    Color YELLOW 08/17/2017 11:24 AM    Appearance TURBID 08/17/2017 11:24 AM    Specific gravity 1.014 08/17/2017 11:24 AM    pH (UA) 5.5 08/17/2017 11:24 AM    Protein TRACE 08/17/2017 11:24 AM    Glucose NEGATIVE  08/17/2017 11:24 AM    Ketone NEGATIVE  08/17/2017 11:24 AM    Bilirubin NEGATIVE  08/17/2017 11:24 AM    Blood MODERATE 08/17/2017 11:24 AM    Urobilinogen 0.2 08/17/2017 11:24 AM    Nitrites NEGATIVE  08/17/2017 11:24 AM    Leukocyte Esterase LARGE 08/17/2017 11:24 AM        Allergies   Allergen Reactions    Pcn [Penicillins] Unknown (comments)     Mulu Richards at [facility] no allergies listed     Immunization History   Administered Date(s) Administered    Influenza High Dose Vaccine PF 11/28/2016    Pneumococcal Conjugate (PCV-13) 05/19/2015, 11/28/2016    Pneumococcal Vaccine (Pcv) 01/04/2008    TB Skin Test (PPD) Intradermal 01/12/2017    Tdap 11/27/2016       All Labs from Last 24 Hrs:  Recent Results (from the past 24 hour(s))   TROPONIN I    Collection Time: 08/17/17 11:14 AM   Result Value Ref Range    Troponin-I, Qt. 0.16 (HH) 0.02 - 0.05 NG/ML   URINALYSIS W/ RFLX MICROSCOPIC    Collection Time: 08/17/17 11:24 AM   Result Value Ref Range    Color YELLOW      Appearance TURBID      Specific gravity 1.014 1.001 - 1.023      pH (UA) 5.5 5.0 - 9.0      Protein TRACE (A) NEG mg/dL    Glucose NEGATIVE  mg/dL    Ketone NEGATIVE  NEG mg/dL    Bilirubin NEGATIVE  NEG      Blood MODERATE (A) NEG      Urobilinogen 0.2 0.2 - 1.0 EU/dL    Nitrites NEGATIVE  NEG      Leukocyte Esterase LARGE (A) NEG      WBC >100 (H) 0 /hpf    RBC 0-3 0 /hpf    Epithelial cells 0 0 /hpf    Bacteria 1+ (H) 0 /hpf    Casts 0-3 0 /lpf   SODIUM, UR, RANDOM    Collection Time: 08/17/17 11:24 AM   Result Value Ref Range    Sodium urine, random 16 MMOL/L   CREATININE, UR, RANDOM    Collection Time: 08/17/17 11:24 AM   Result Value Ref Range    Creatinine, urine 740.47 mg/dL   METABOLIC PANEL, BASIC    Collection Time: 08/18/17  4:50 AM   Result Value Ref Range    Sodium 145 136 - 145 mmol/L    Potassium 3.6 3.5 - 5.1 mmol/L    Chloride 112 (H) 98 - 107 mmol/L    CO2 21 21 - 32 mmol/L    Anion gap 12 7 - 16 mmol/L    Glucose 96 65 - 100 mg/dL    BUN 46 (H) 8 - 23 MG/DL    Creatinine 1.87 (H) 0.6 - 1.0 MG/DL    GFR est AA 32 (L) >60 ml/min/1.73m2    GFR est non-AA 27 (L) >60 ml/min/1.73m2    Calcium 8.9 8.3 - 10.4 MG/DL   CBC WITH AUTOMATED DIFF    Collection Time: 08/18/17  4:50 AM   Result Value Ref Range    WBC 5.7 4.3 - 11.1 K/uL    RBC 3.28 (L) 4.05 - 5.25 M/uL    HGB 10.5 (L) 11.7 - 15.4 g/dL    HCT 31.5 (L) 35.8 - 46.3 %    MCV 96.0 79.6 - 97.8 FL    MCH 32.0 26.1 - 32.9 PG    MCHC 33.3 31.4 - 35.0 g/dL    RDW 14.2 11.9 - 14.6 %    PLATELET 089 325 - 739 K/uL    MPV 10.9 10.8 - 14.1 FL    DF AUTOMATED      NEUTROPHILS 71 43 - 78 %    LYMPHOCYTES 13 13 - 44 %    MONOCYTES 13 (H) 4.0 - 12.0 %    EOSINOPHILS 2 0.5 - 7.8 %    BASOPHILS 0 0.0 - 2.0 %    IMMATURE GRANULOCYTES 0.7 0.0 - 5.0 %    ABS. NEUTROPHILS 4.0 1.7 - 8.2 K/UL    ABS. LYMPHOCYTES 0.8 0.5 - 4.6 K/UL    ABS. MONOCYTES 0.7 0.1 - 1.3 K/UL    ABS. EOSINOPHILS 0.1 0.0 - 0.8 K/UL    ABS. BASOPHILS 0.0 0.0 - 0.2 K/UL    ABS. IMM. GRANS. 0.0 0.0 - 0.5 K/UL       Discharge Exam:  Patient Vitals for the past 24 hrs:   Temp Pulse Resp BP SpO2   08/18/17 0714 97.4 °F (36.3 °C) 69 16 132/52 95 %   08/18/17 0348 97.7 °F (36.5 °C) 70 16 113/49 95 %   08/17/17 2330 97.8 °F (36.6 °C) 70 19 106/57 92 %   08/17/17 1954 97.4 °F (36.3 °C) 70 18 109/59 92 %   08/17/17 1451 97.6 °F (36.4 °C) 70 20 94/40 92 %   08/17/17 1147 97.9 °F (36.6 °C) 69 20 108/46 93 %     Oxygen Therapy  O2 Sat (%): 95 % (08/18/17 0714)  Pulse via Oximetry: 70 beats per minute (08/16/17 2229)  O2 Device: Room air (08/17/17 1428)    Intake/Output Summary (Last 24 hours) at 08/18/17 1033  Last data filed at 08/18/17 0921   Gross per 24 hour   Intake             1242 ml   Output                0 ml   Net             1242 ml       General:    Well nourished. Alert. No distress. Eyes:   Normal sclera. Extraocular movements intact. ENT:  Normocephalic, atraumatic. Moist mucous membranes  CV:   Regular rate and rhythm. No murmur, rub, or gallop. Lungs:  Clear to auscultation bilaterally. No wheezing, rhonchi, or rales. Abdomen: Soft, nontender, nondistended. Bowel sounds normal.   Extremities: Warm and dry. No cyanosis or edema. Neurologic: CN II-XII grossly intact. Sensation intact. Skin:     No rashes or jaundice. Psych:  Normal mood and affect. Discharge Info:   Discharge Medication List as of 8/18/2017  9:16 AM      CONTINUE these medications which have NOT CHANGED    Details   amiodarone (CORDARONE) 200 mg tablet Take 0.5 Tabs by mouth every morning., No Print, Disp-15 Tab, R-11      carvedilol (COREG) 3.125 mg tablet Take 1 Tab by mouth daily. , No Print, Disp-30 Tab, R-11      clopidogrel (PLAVIX) 75 mg tab Take 1 Tab by mouth daily. , Normal, Disp-30 Tab, R-11      cholecalciferol, VITAMIN D3, (VITAMIN D3) 5,000 unit tab tablet Take 1 Tab by mouth daily. , Normal, Disp-90 Tab, R-3      ferrous sulfate 325 mg (65 mg iron) tablet Take 1 Tab by mouth Daily (before breakfast). , Normal, Disp-90 Tab, R-1      aspirin delayed-release 81 mg tablet Take 1 Tab by mouth daily. , No Print, Disp-30 Tab, R-11      nitroglycerin (NITROSTAT) 0.4 mg SL tablet 1 Tab by SubLINGual route every five (5) minutes as needed for Chest Pain., Normal, Disp-25 Tab, R-11         STOP taking these medications       furosemide (LASIX) 40 mg tablet Comments:   Reason for Stopping:         lisinopril (PRINIVIL, ZESTRIL) 2.5 mg tablet Comments:   Reason for Stopping:                 Disposition: home    Activity: Activity as tolerated  Diet: DIET CARDIAC Regular    Follow-up Information     Follow up With Details Comments Contact Sarah Farley MD  left message for office to call patient with appointment date and time. [address] [facility]  [phone]              Time spent in patient discharge planning and coordination 45 minutes.     Signed:  Miya Muñiz MD

## 2017-08-18 NOTE — DISCHARGE INSTRUCTIONS
DISCHARGE SUMMARY from Nurse    The following personal items are in your possession at time of discharge:       Visual Aid: Glasses        Jewelry: None     Other Valuables: None             PATIENT INSTRUCTIONS:    After general anesthesia or intravenous sedation, for 24 hours or while taking prescription Narcotics:  · Limit your activities  · Do not drive and operate hazardous machinery  · Do not make important personal or business decisions  · Do  not drink alcoholic beverages  · If you have not urinated within 8 hours after discharge, please contact your surgeon on call. Report the following to your surgeon:  · Excessive pain, swelling, redness or odor of or around the surgical area  · Temperature over 100.5  · Nausea and vomiting lasting longer than 4 hours or if unable to take medications  · Any signs of decreased circulation or nerve impairment to extremity: change in color, persistent  numbness, tingling, coldness or increase pain  · Any questions        What to do at Home:  Recommended activity: Activity as tolerated    If you experience any of the following symptoms changes in mental status, fever greater than 101, decreased urinary output, or nausea/vomiting, please follow up with your primary care physicain. *  Please give a list of your current medications to your Primary Care Provider. *  Please update this list whenever your medications are discontinued, doses are      changed, or new medications (including over-the-counter products) are added. *  Please carry medication information at all times in case of emergency situations. These are general instructions for a healthy lifestyle:    No smoking/ No tobacco products/ Avoid exposure to second hand smoke    Surgeon General's Warning:  Quitting smoking now greatly reduces serious risk to your health.     Obesity, smoking, and sedentary lifestyle greatly increases your risk for illness    A healthy diet, regular physical exercise & weight monitoring are important for maintaining a healthy lifestyle    You may be retaining fluid if you have a history of heart failure or if you experience any of the following symptoms:  Weight gain of 3 pounds or more overnight or 5 pounds in a week, increased swelling in our hands or feet or shortness of breath while lying flat in bed. Please call your doctor as soon as you notice any of these symptoms; do not wait until your next office visit. Recognize signs and symptoms of STROKE:    F-face looks uneven    A-arms unable to move or move unevenly    S-speech slurred or non-existent    T-time-call 911 as soon as signs and symptoms begin-DO NOT go       Back to bed or wait to see if you get better-TIME IS BRAIN. Warning Signs of HEART ATTACK     Call 911 if you have these symptoms:   Chest discomfort. Most heart attacks involve discomfort in the center of the chest that lasts more than a few minutes, or that goes away and comes back. It can feel like uncomfortable pressure, squeezing, fullness, or pain.  Discomfort in other areas of the upper body. Symptoms can include pain or discomfort in one or both arms, the back, neck, jaw, or stomach.  Shortness of breath with or without chest discomfort.  Other signs may include breaking out in a cold sweat, nausea, or lightheadedness. Don't wait more than five minutes to call 911 - MINUTES MATTER! Fast action can save your life. Calling 911 is almost always the fastest way to get lifesaving treatment. Emergency Medical Services staff can begin treatment when they arrive -- up to an hour sooner than if someone gets to the hospital by car. The discharge information has been reviewed with the patient. The patient verbalized understanding. Discharge medications reviewed with the patient and appropriate educational materials and side effects teaching were provided.                Dehydration: Care Instructions  Your Care Instructions  Dehydration happens when your body loses too much fluid. This might happen when you do not drink enough water or you lose large amounts of fluids from your body because of diarrhea, vomiting, or sweating. Severe dehydration can be life-threatening. Water and minerals called electrolytes help put your body fluids back in balance. Learn the early signs of fluid loss, and drink more fluids to prevent dehydration. Follow-up care is a key part of your treatment and safety. Be sure to make and go to all appointments, and call your doctor if you are having problems. It's also a good idea to know your test results and keep a list of the medicines you take. How can you care for yourself at home? · To prevent dehydration, drink plenty of fluids, enough so that your urine is light yellow or clear like water. Choose water and other caffeine-free clear liquids until you feel better. If you have kidney, heart, or liver disease and have to limit fluids, talk with your doctor before you increase the amount of fluids you drink. · If you do not feel like eating or drinking, try taking small sips of water, sports drinks, or other rehydration drinks. · Get plenty of rest.  To prevent dehydration  · Add more fluids to your diet and daily routine, unless your doctor has told you not to. · During hot weather, drink more fluids. Drink even more fluids if you exercise a lot. Stay away from drinks with alcohol or caffeine. · Watch for the symptoms of dehydration. These include:  ¨ A dry, sticky mouth. ¨ Dark yellow urine, and not much of it. ¨ Dry and sunken eyes. ¨ Feeling very tired. · Learn what problems can lead to dehydration. These include:  ¨ Diarrhea, fever, and vomiting. ¨ Any illness with a fever, such as pneumonia or the flu. ¨ Activities that cause heavy sweating, such as endurance races and heavy outdoor work in hot or humid weather.   ¨ Alcohol or drug abuse or withdrawal.  ¨ Certain medicines, such as cold and allergy pills (antihistamines), diet pills (diuretics), and laxatives. ¨ Certain diseases, such as diabetes, cancer, and heart or kidney disease. When should you call for help? Call 911 anytime you think you may need emergency care. For example, call if:  · You passed out (lost consciousness). Call your doctor now or seek immediate medical care if:  · You are confused and cannot think clearly. · You are dizzy or lightheaded, or you feel like you may faint. · You have signs of needing more fluids. You have sunken eyes and a dry mouth, and you pass only a little dark urine. · You cannot keep fluids down. Watch closely for changes in your health, and be sure to contact your doctor if:  · You are not making tears. · Your skin is very dry and sags slowly back into place after you pinch it. · Your mouth and eyes are very dry. Where can you learn more? Go to http://liu-keith.info/. Enter K691 in the search box to learn more about \"Dehydration: Care Instructions. \"  Current as of: March 20, 2017  Content Version: 11.3  © 4809-7801 [employer]. Care instructions adapted under license by [employer] (which disclaims liability or warranty for this information). If you have questions about a medical condition or this instruction, always ask your healthcare professional. [address] any warranty or liability for your use of this information.

## 2017-08-18 NOTE — PROGRESS NOTES
Discharge instructions and prescriptions given and reviewed with pt and family, verbalizes understanding, pt discharged home with family.

## 2017-08-19 NOTE — PROGRESS NOTES
Transition of Care Discharge Follow-up Questionnaire   Date/Time of Call:   8/19/17 1:02p   What was the patient hospitalized for? Does the patient understand his/her diagnosis and/or treatment and what happened during the hospitalization? Patients granddaughter verbalizes understanding of diagnosis and treatment. Did the patient receive discharge instructions? Yes     Review any discharge instructions (see notes in [facility]). Ask patient if they understand these. Do they have any questions? She does not have any questions. Were home services ordered (nursing, PT, OT, ST, etc.)? No   If so, has the first visit occurred? If not, why? (Assist with coordination of services if necessary.) n/a   Was any DME ordered? No   If so, has it been received? If not, why?  (Assist with coordination of arranging DME orders if necessary.) n/a   Complete a review of all medications (new, continued and discontinued meds per the D/C instructions and medication tab in [facility]). Patients granddaughter states that she is compliant with medications. Were all new prescriptions filled? If not, why?  (Assist with obtainment of medications if necessary.) No medication changes   Does the patient understand the purpose and dosing instructions for all medications? (If patient has questions, provide explanation and education.) Yes   Does the patient have any problems in performing ADLs? (If patient is unable to perform ADLs  what is the limiting factor(s)? Do they have a support system that can assist? If no support system is present, discuss possible assistance that they may be able to obtain.) Patient uses a rollator for assistance with ambulation. Patient does not verbalize any problems in performing ADLs. Patient lives with granddaughter. Granddaughter assists with ADLs around the house as needed. Does the patient have all follow-up appointments scheduled?       7 day f/up with PCP?    7-14 day f/up with specialist?    If f/up has not been made  what actions has the care coordinator made to accomplish this? Has transportation been arranged? [facility] follow-up should be within 7 days of discharge; all others should have PCP follow-up within 7 days of discharge; follow-ups with other specialists should be within 7-14 days of discharge.) Patient has the following appt(s):  PCP appt 8/21     Any other questions or concerns expressed by the patient? No other questions or concerns were expressed by the patients granddaughter to care coordinator. Schedule next appointment with SHUN PICKARD Coordinator or refer to RN Case Manager/  per the workflow guidelines. When is care coordinators next follow-up call scheduled? If referred for CCM  what RN care manager was the referral assigned? CC will follow-up with patient within the next 30 days. ROSE Call Completed By:   Yi Hill LPN  Care Coordinator                   This note will not be viewable in [address]

## 2017-08-26 NOTE — ED TRIAGE NOTES
Pt states \"she was going too fast with her 4-calvo\" and fell hitting the right side of her head, no LOC and denies pain. Pt takes a blood thinner.

## 2017-08-26 NOTE — ED PROVIDER NOTES
HPI Comments: 79 y/o f to ed with family for eval after fall today. Admits she was walking with her rollator, lost balance and tripped falling into wall and striking her head before she fell to the ground. Also fell two days ago with similar incident, striking left flank. No loc at either incident. Has had some weakness in the last few days, no fever or chills,  Just finishing abx for uti. Was in hospital last week with kidney problems per daughter at side. Patient is a 80 y.o. female presenting with fall. The history is provided by the patient and a relative. No  was used. Fall   The accident occurred 1 to 2 hours ago (and fell two days ago). The fall occurred while walking (with her rollator). She fell from a height of ground level. She landed on hard floor (and corner of wall). The point of impact was the head (left flank two days ago). The pain is moderate. She was ambulatory at the scene. Pertinent negatives include no visual change, no fever, no numbness, no abdominal pain, no bowel incontinence, no nausea, no vomiting, no hematuria, no headaches, no extremity weakness, no hearing loss, no loss of consciousness, no tingling and no laceration.         Past Medical History:   Diagnosis Date    AICD (automatic cardioverter/defibrillator) present     Anemia 10/25/2012    Aortic valve regurgitation     Arrhythmia     blessing, pacemaker, a fib    CAD (coronary artery disease) 9/4/2012    stent    CHF (congestive heart failure) ([address] 9/4/2012    CHF (congestive heart failure) ([address] 9/4/2012    Chronic kidney disease, stage II (mild)     Chronic systolic heart failure ([address] 4/16/2013    Diverticulitis     Encounter for long-term (current) use of other medications 11/19/2012    Esophagitis 2010    EGD    Falls     Femur fracture, left ([address] 12/02/2016    Fracture of left radius 12/02/2016    left radius and ulna fx    Fracture of wrist 2003    left/pins    HLD (hyperlipidemia) 9/4/2012    HTN (hypertension) 9/4/2012    pt denies    Insomnia     Ischemic cardiomyopathy, chronic     Left bundle branch block (LBBB)     Osteoporosis 9/4/2012    S/P PTCA (percutaneous transluminal coronary angioplasty)     Sigmoid stricture ([address]     Dr. Wolf Jones    Stiffness in joint     left hip, left wrist    Varicose vein 2011    surg/ Dr. Joanie Perez       Past Surgical History:   Procedure Laterality Date    HX CYST REMOVAL  2009    right hand    HX GYN      hysterectomy, bladder sling    HX HIP REPLACEMENT      after fracture    HX IMPLANTABLE CARDIOVERTER DEFIBRILLATOR      HX ORTHOPAEDIC      lt hand    HX PTCA  2012         History reviewed. No pertinent family history. Social History     Social History    Marital status: SINGLE     Spouse name: N/A    Number of children: N/A    Years of education: N/A     Occupational History    Not on file. Social History Main Topics    Smoking status: Never Smoker    Smokeless tobacco: Never Used    Alcohol use Yes      Comment: occasional    Drug use: No    Sexual activity: Not on file     Other Topics Concern    Not on file     Social History Narrative    /no living children         ALLERGIES: Pcn [penicillins]    Review of Systems   Constitutional: Positive for fatigue. Negative for chills and fever. HENT: Negative for facial swelling and mouth sores. Eyes: Negative for discharge and redness. Respiratory: Negative for cough and shortness of breath. Cardiovascular: Negative for chest pain and palpitations. Gastrointestinal: Negative for abdominal pain, bowel incontinence, nausea and vomiting. Endocrine: Negative for cold intolerance and heat intolerance. Genitourinary: Negative for difficulty urinating, dysuria, flank pain, hematuria and pelvic pain. Musculoskeletal: Negative for back pain, extremity weakness, neck pain and neck stiffness. Skin: Positive for color change.  Negative for pallor and rash. Neurological: Positive for weakness. Negative for dizziness, tingling, loss of consciousness, numbness and headaches. Psychiatric/Behavioral: Negative for confusion and decreased concentration. Vitals:    08/26/17 1111   BP: 126/53   Pulse: 77   Resp: 18   Temp: 97.7 °F (36.5 °C)   SpO2: 92%   Weight: 56.7 kg (125 lb)   Height: 5' 3\" (1.6 m)            Physical Exam   Constitutional: She is oriented to person, place, and time. She appears well-developed and well-nourished. No distress. HENT:   Head: Normocephalic and atraumatic. Right Ear: External ear normal.   Left Ear: External ear normal.   Nose: Nose normal.   Eyes: Conjunctivae and EOM are normal. Pupils are equal, round, and reactive to light. Neck: Normal range of motion. Neck supple. Cardiovascular: Normal rate, regular rhythm and normal heart sounds. Pulmonary/Chest: Effort normal and breath sounds normal. No respiratory distress. She has no wheezes. Abdominal: Soft. Bowel sounds are normal. She exhibits no distension. There is no tenderness. Musculoskeletal: Normal range of motion. She exhibits tenderness. She exhibits no edema. Back:    Neurological: She is alert and oriented to person, place, and time. No cranial nerve deficit. Coordination normal.   No pain with palpation of c spine. No pain with palpation of t or l spines. Excellent rom to all extremities. No pain with palpation of pelvis. Ribs and sternum are stable to palpation. No resp distress present. No obvious wounds noted on exam   Skin: Skin is warm and dry. No laceration and no rash noted. Psychiatric: She has a normal mood and affect. Her behavior is normal. Judgment and thought content normal.   Nursing note and vitals reviewed. MDM  Number of Diagnoses or Management Options  Diagnosis management comments: 81 y/o f to ed with family for eval after fall today.   Admits she was walking with her rollator, lost balance and tripped falling into wall and striking her head before she fell to the ground. Also fell two days ago with similar incident, striking left flank. No loc at either incident. Has had some weakness in the last few days, no fever or chills,  Just finishing abx for uti. Was in hospital last week with kidney problems per daughter at side. Will eval ct brain and c spine, baseline labs as well. Pt refuses offer of pain meds at present time. 4:14 PM  Update-  Pt very anxious for dc. I have reviewed with her lab findings - daughter admits pt has had some sob intermittently. Pt denies. cxr indicates l basilar atelectasis. Wbc normal, K a little elevated at 5.9- pt has changed dose of lasix from daily to 3/week. As well, her bun and cr are increased from her discharge - now 34/1.7. Trop 0.5 with no stemi noted on ekg. bnp is 4600+ and her urine still indicates uti. Pt was placed on bactrim by her primary md after her dc from hospital last week - I suspect this is cause of elevation in her bun/cr combined with her lasix therapy. I have discussed at length w pt and her daughter concern for volume overload combined with ASHIA/renal insuff. Pt refuses admit after long discussion and explanation that this can lead to events up to death. Pt tells me at her age she is ready to go. She currently want to go to [facility] and get [employer]. Extensive conversation re likely poor outcomes and even death have not swayed this pt and she will be discharged home. I will stop her potassium, stop the bactrim and switch her to keflex due to less nephrotoxic effects. Daughter present for all of the above and will bring pt back if needed.    Pt has been discussed at length with Dr Carlos Jj and/or Complexity of Data Reviewed  Clinical lab tests: ordered and reviewed  Tests in the radiology section of CPT®: ordered and reviewed  Discuss the patient with other providers: yes (nicholas)    Risk of Complications, Morbidity, and/or Mortality  Presenting problems: minimal  Diagnostic procedures: minimal  Management options: minimal    Patient Progress  Patient progress: stable    ED Course       Procedures

## 2017-08-26 NOTE — DISCHARGE INSTRUCTIONS
Urinary Tract Infection in Women: Care Instructions  Your Care Instructions    A urinary tract infection, or UTI, is a general term for an infection anywhere between the kidneys and the urethra (where urine comes out). Most UTIs are bladder infections. They often cause pain or burning when you urinate. UTIs are caused by bacteria and can be cured with antibiotics. Be sure to complete your treatment so that the infection goes away. Follow-up care is a key part of your treatment and safety. Be sure to make and go to all appointments, and call your doctor if you are having problems. It's also a good idea to know your test results and keep a list of the medicines you take. How can you care for yourself at home? · Take your antibiotics as directed. Do not stop taking them just because you feel better. You need to take the full course of antibiotics. · Drink extra water and other fluids for the next day or two. This may help wash out the bacteria that are causing the infection. (If you have kidney, heart, or liver disease and have to limit fluids, talk with your doctor before you increase your fluid intake.)  · Avoid drinks that are carbonated or have caffeine. They can irritate the bladder. · Urinate often. Try to empty your bladder each time. · To relieve pain, take a hot bath or lay a heating pad set on low over your lower belly or genital area. Never go to sleep with a heating pad in place. To prevent UTIs  · Drink plenty of water each day. This helps you urinate often, which clears bacteria from your system. (If you have kidney, heart, or liver disease and have to limit fluids, talk with your doctor before you increase your fluid intake.)  · Urinate when you need to. · Urinate right after you have sex. · Change sanitary pads often. · Avoid douches, bubble baths, feminine hygiene sprays, and other feminine hygiene products that have deodorants.   · After going to the bathroom, wipe from front to back.  When should you call for help? Call your doctor now or seek immediate medical care if:  · Symptoms such as fever, chills, nausea, or vomiting get worse or appear for the first time. · You have new pain in your back just below your rib cage. This is called flank pain. · There is new blood or pus in your urine. · You have any problems with your antibiotic medicine. Watch closely for changes in your health, and be sure to contact your doctor if:  · You are not getting better after taking an antibiotic for 2 days. · Your symptoms go away but then come back. Where can you learn more? Go to http://liu-keith.info/. Enter O879 in the search box to learn more about \"Urinary Tract Infection in Women: Care Instructions. \"  Current as of: November 28, 2016  Content Version: 11.3  © 3743-8927 [employer]. Care instructions adapted under license by [employer] (which disclaims liability or warranty for this information). If you have questions about a medical condition or this instruction, always ask your healthcare professional. [place] [address] any warranty or liability for your use of this information. Acute Kidney Injury: Care Instructions  Your Care Instructions  Acute kidney injury is the sudden loss of kidney function that happens when the kidneys stop working over a period of hours, days or, in some cases, weeks. It is also known as acute renal failure. Common causes of acute kidney injury are dehydration, blood loss, and medicines. When acute kidney injury happens, the kidneys cannot remove waste and excess fluids from the body. The waste and fluids build up and become harmful. Kidney function may return to normal if the cause of acute kidney injury is treated quickly. Your chance of a full recovery depends on what caused the problem, how quickly the cause was treated, and what other medical problems you have.  A machine may be used to help your kidneys remove waste and fluids for a short period of time. This is called dialysis. Follow-up care is a key part of your treatment and safety. Be sure to make and go to all appointments, and call your doctor if you are having problems. It's also a good idea to know your test results and keep a list of the medicines you take. How can you care for yourself at home? · Talk to your doctor about how much fluid you should drink. · Eat a balanced diet. Talk to your doctor or a dietitian about what type of diet may be best for you. · Follow the instructions and schedule for dialysis that your doctor gives you. · Do not smoke. Smoking can make your condition worse. If you need help quitting, talk to your doctor about stop-smoking programs and medicines. These can increase your chances of quitting for good. · Do not drink alcohol. · Review all of your medicines with your doctor. Do not take any medicines, including nonsteroidal anti-inflammatory drugs (NSAIDs) such as ibuprofen (Advil, Motrin) or naproxen (Aleve), unless your doctor says it is safe for you to do so. · Make sure that anyone treating you for any health problem knows that you have had acute kidney injury. When should you call for help? Call 911 anytime you think you may need emergency care. For example, call if:  · You passed out (lost consciousness). · You have severe trouble breathing. Call your doctor now or seek immediate medical care if:  · You have less urine than normal or no urine. · You have trouble urinating or can urinate only very small amounts. · You are confused or have trouble thinking clearly. · You feel weaker or more tired than usual.  · You are very thirsty, lightheaded, or dizzy. · You have nausea and vomiting. · You have new swelling of your arms or feet, or your swelling is worse. · You have blood in your urine. · Your body weight goes up every day. · You have new or worse trouble breathing.   Watch closely for changes in your health, and be sure to contact your doctor if:  · You do not get better as expected. Where can you learn more? Go to http://liu-keith.info/. Enter T449 in the search box to learn more about \"Acute Kidney Injury: Care Instructions. \"  Current as of: April 3, 2017  Content Version: 11.3  © 9988-5986 [employer]. Care instructions adapted under license by [employer] (which disclaims liability or warranty for this information). If you have questions about a medical condition or this instruction, always ask your healthcare professional. [place] [address] any warranty or liability for your use of this information. Heart Failure: Care Instructions  Your Care Instructions    Heart failure occurs when your heart does not pump as much blood as the body needs. Failure does not mean that the heart has stopped pumping but rather that it is not pumping as well as it should. Over time, this causes fluid buildup in your lungs and other parts of your body. Fluid buildup can cause shortness of breath, fatigue, swollen ankles, and other problems. By taking medicines regularly, reducing sodium (salt) in your diet, checking your weight every day, and making lifestyle changes, you can feel better and live longer. Follow-up care is a key part of your treatment and safety. Be sure to make and go to all appointments, and call your doctor if you are having problems. It's also a good idea to know your test results and keep a list of the medicines you take. How can you care for yourself at home? Medicines  · Be safe with medicines. Take your medicines exactly as prescribed. Call your doctor if you think you are having a problem with your medicine.   · Do not take any vitamins, over-the-counter medicine, or herbal products without talking to your doctor first. Nelly Furmatiasng not take ibuprofen (Advil or Motrin) and naproxen (Aleve) without talking to your doctor first. They could make your heart failure worse. · You may be taking some of the following medicine. ¨ Beta-blockers can slow heart rate, decrease blood pressure, and improve your condition. Taking a beta-blocker may lower your chance of needing to be hospitalized. ¨ Angiotensin-converting enzyme inhibitors (ACEIs) reduce the heart's workload, lower blood pressure, and reduce swelling. Taking an ACEI may lower your chance of needing to be hospitalized again. ¨ Angiotensin II receptor blockers (ARBs) work like ACEIs. Your doctor may prescribe them instead of ACEIs. ¨ Diuretics, also called water pills, reduce swelling. ¨ Potassium supplements replace this important mineral, which is sometimes lost with diuretics. ¨ Aspirin and other blood thinners prevent blood clots, which can cause a stroke or heart attack. You will get more details on the specific medicines your doctor prescribes. Diet  · Your doctor may suggest that you limit sodium to 2,000 milligrams (mg) a day or less. That is less than 1 teaspoon of salt a day, including all the salt you eat in cooking or in packaged foods. People get most of their sodium from processed foods. Fast food and restaurant meals also tend to be very high in sodium. · Ask your doctor how much liquid you can drink each day. You may have to limit liquids. Weight  · Weigh yourself without clothing at the same time each day. Record your weight. Call your doctor if you have a sudden weight gain, such as more than 2 to 3 pounds in a day or 5 pounds in a week. (Your doctor may suggest a different range of weight gain.) A sudden weight gain may mean that your heart failure is getting worse. Activity level  · Start light exercise (if your doctor says it is okay). Even if you can only do a small amount, exercise will help you get stronger, have more energy, and manage your weight and your stress. Walking is an easy way to get exercise.  Start out by walking a little more than you did before. Bit by bit, increase the amount you walk. · When you exercise, watch for signs that your heart is working too hard. You are pushing yourself too hard if you cannot talk while you are exercising. If you become short of breath or dizzy or have chest pain, stop, sit down, and rest.  · If you feel \"wiped out\" the day after you exercise, walk slower or for a shorter distance until you can work up to a better pace. · Get enough rest at night. Sleeping with 1 or 2 pillows under your upper body and head may help you breathe easier. Lifestyle changes  · Do not smoke. Smoking can make a heart condition worse. If you need help quitting, talk to your doctor about stop-smoking programs and medicines. These can increase your chances of quitting for good. Quitting smoking may be the most important step you can take to protect your heart. · Limit alcohol to 2 drinks a day for men and 1 drink a day for women. Too much alcohol can cause health problems. · Avoid getting sick from colds and the flu. Get a pneumococcal vaccine shot. If you have had one before, ask your doctor whether you need another dose. Get a flu shot each year. If you must be around people with colds or the flu, wash your hands often. When should you call for help? Call 911 if you have symptoms of sudden heart failure such as:  · You have severe trouble breathing. · You cough up pink, foamy mucus. · You have a new irregular or rapid heartbeat. Call your doctor now or seek immediate medical care if:  · You have new or increased shortness of breath. · You are dizzy or lightheaded, or you feel like you may faint. · You have sudden weight gain, such as more than 2 to 3 pounds in a day or 5 pounds in a week. (Your doctor may suggest a different range of weight gain.)  · You have increased swelling in your legs, ankles, or feet. · You are suddenly so tired or weak that you cannot do your usual activities.   Watch closely for changes in your health, and be sure to contact your doctor if:  · You develop new symptoms. Where can you learn more? Go to http://liu-keith.info/. Enter O816 in the search box to learn more about \"Heart Failure: Care Instructions. \"  Current as of: April 3, 2017  Content Version: 11.3  © 7364-0374 [employer]. Care instructions adapted under license by [employer] (which disclaims liability or warranty for this information). If you have questions about a medical condition or this instruction, always ask your healthcare professional. [address] any warranty or liability for your use of this information.

## 2017-08-26 NOTE — ED NOTES
I have reviewed discharge instructions with the patient. The patient verbalized understanding. Printed instructions, follow-up information, and a prescription was given. Pt left via wheelchair in no acute distress with her family.

## 2017-09-14 NOTE — PROGRESS NOTES
ROSE F/U:    CC spoke to Alcon (Granddaughter). She verbalizes patient is \"exhausted and wants to nod off a lot but gaining appetite. \" Patient has completed round of physical therapy at the house. Patient is taking medications as directed and following up with PCP as instructed. CC will close case. This note will not be viewable in [address]

## 2017-09-29 PROBLEM — J96.01 ACUTE RESPIRATORY FAILURE WITH HYPOXEMIA (HCC): Status: ACTIVE | Noted: 2017-01-01

## 2017-09-29 PROBLEM — J18.9 PNEUMONIA INVOLVING RIGHT LUNG: Status: ACTIVE | Noted: 2017-01-01

## 2017-09-29 NOTE — ED PROVIDER NOTES
HPI Comments: 77-year-old female brought to the ED for fatigue and cough. Patient's being treated for upper airway on this with doxycycline for the last 5 days has not gotten any better. Family states she is not eating or drinking having increased difficulty breathing. Patient is a 80 y.o. female presenting with fatigue. The history is provided by a relative. Fatigue   This is a new problem. The current episode started more than 1 week ago. The problem has been gradually worsening. Primary symptoms include mental status change. Patient reports a subjective fever - was not measured. Associated symptoms include confusion.         Past Medical History:   Diagnosis Date    AICD (automatic cardioverter/defibrillator) present     Anemia 10/25/2012    Aortic valve regurgitation     Arrhythmia     blessing, pacemaker, a fib    CAD (coronary artery disease) 9/4/2012    stent    CHF (congestive heart failure) ([address] 9/4/2012    CHF (congestive heart failure) ([address] 9/4/2012    Chronic kidney disease, stage II (mild)     Chronic systolic heart failure ([address] 4/16/2013    Diverticulitis     Encounter for long-term (current) use of other medications 11/19/2012    Esophagitis 2010    EGD    Falls     Femur fracture, left ([address] 12/02/2016    Fracture of left radius 12/02/2016    left radius and ulna fx    Fracture of wrist 2003    left/pins    HLD (hyperlipidemia) 9/4/2012    HTN (hypertension) 9/4/2012    pt denies    Insomnia     Ischemic cardiomyopathy, chronic     Left bundle branch block (LBBB)     Osteoporosis 9/4/2012    S/P PTCA (percutaneous transluminal coronary angioplasty)     Sigmoid stricture (HCC)     Dr. Zachary Bertrand    Stiffness in joint     left hip, left wrist    Varicose vein 2011    surg/ Dr. Chu Herman       Past Surgical History:   Procedure Laterality Date    HX CYST REMOVAL  2009    right hand    HX GYN      hysterectomy, bladder sling    HX HIP REPLACEMENT      after fracture    HX IMPLANTABLE CARDIOVERTER DEFIBRILLATOR      HX ORTHOPAEDIC      lt hand    HX PTCA  2012         History reviewed. No pertinent family history. Social History     Social History    Marital status: SINGLE     Spouse name: N/A    Number of children: N/A    Years of education: N/A     Occupational History    Not on file. Social History Main Topics    Smoking status: Never Smoker    Smokeless tobacco: Never Used    Alcohol use Yes      Comment: occasional    Drug use: No    Sexual activity: Not on file     Other Topics Concern    Not on file     Social History Narrative    /no living children         ALLERGIES: Pcn [penicillins]    Review of Systems   Constitutional: Positive for fatigue. Negative for activity change. HENT: Negative. Eyes: Negative. Respiratory: Negative. Cardiovascular: Negative. Gastrointestinal: Negative. Genitourinary: Negative. Musculoskeletal: Negative. Skin: Negative. Neurological: Negative. Psychiatric/Behavioral: Positive for confusion. All other systems reviewed and are negative. Vitals:    09/29/17 1058   BP: 113/54   Pulse: 79   Resp: 18   Temp: 97.8 °F (36.6 °C)   SpO2: (!) 83%   Weight: 56.7 kg (125 lb)   Height: 5' 3\" (1.6 m)            Physical Exam   Constitutional: She is oriented to person, place, and time. She appears well-developed and well-nourished. No distress. HENT:   Head: Normocephalic and atraumatic. Right Ear: External ear normal.   Left Ear: External ear normal.   Nose: Nose normal.   Mouth/Throat: Oropharynx is clear and moist. No oropharyngeal exudate. Eyes: Conjunctivae and EOM are normal. Pupils are equal, round, and reactive to light. Right eye exhibits no discharge. Left eye exhibits no discharge. No scleral icterus. Neck: Normal range of motion. Neck supple. No JVD present. No tracheal deviation present. Cardiovascular: Normal rate, regular rhythm and intact distal pulses.     Pulmonary/Chest: Effort normal and breath sounds normal. No stridor. No respiratory distress. She has no wheezes. She exhibits no tenderness. Abdominal: Soft. Bowel sounds are normal. She exhibits no distension and no mass. There is no tenderness. Musculoskeletal: Normal range of motion. She exhibits no edema or tenderness. Neurological: She is alert and oriented to person, place, and time. No cranial nerve deficit. Skin: Skin is warm and dry. No rash noted. She is not diaphoretic. No erythema. No pallor. Psychiatric: She has a normal mood and affect. Her behavior is normal. Thought content normal.   Nursing note and vitals reviewed. MDM  Number of Diagnoses or Management Options  Congestive heart failure, unspecified congestive heart failure chronicity, unspecified congestive heart failure type [facility]):   Dehydration:   SOB (shortness of breath):   Diagnosis management comments: Patient with increasing BNP revealing congestive heart failure chest x-ray equivocal.  Patient has varying SaO2 but is staying in the mid 80s on 4 L. We'll apply BiPAP. Her renal function is worsening diuresis will need to be done with care. Admission to the hospital requested.        Amount and/or Complexity of Data Reviewed  Clinical lab tests: ordered and reviewed  Tests in the radiology section of CPT®: ordered and reviewed  Tests in the medicine section of CPT®: ordered and reviewed  Discuss the patient with other providers: yes    Risk of Complications, Morbidity, and/or Mortality  Presenting problems: high  Diagnostic procedures: high  Management options: high      ED Course       Procedures

## 2017-09-29 NOTE — PROGRESS NOTES
Pt resting quietly in BIPAP. NAD. No changes made at this time. BIPAP settings: 12/6, RR14, 30%. O2 sat 97%. HR 78.

## 2017-09-29 NOTE — ROUTINE PROCESS
TRANSFER - OUT REPORT:    Verbal report given to Mikhail Esparza RN (name) on Alo Cleary  being transferred to Telemetry room #375(unit) for routine progression of care       Report consisted of patients Situation, Background, Assessment and   Recommendations(SBAR). Information from the following report(s) ED Summary was reviewed with the receiving nurse. Lines:   Peripheral IV 09/29/17 Left Wrist (Active)   Site Assessment Clean, dry, & intact 9/29/2017 11:14 AM   Phlebitis Assessment 0 9/29/2017 11:14 AM   Infiltration Assessment 0 9/29/2017 11:14 AM   Dressing Status Clean, dry, & intact 9/29/2017 11:14 AM        Opportunity for questions and clarification was provided.       Patient transported with:   Registered Nurse

## 2017-09-29 NOTE — ED TRIAGE NOTES
Patient complains of weakness and cough for 1 week. Patient has recently taken doxycyline for cough. PCP sen to ED.

## 2017-09-29 NOTE — PROGRESS NOTES
Patient placed on BIPAP of 12/6 R 14 and 30% Fio2 per Dr. Valentin Gonzalez. Patient tolerating at this time, sats are 93%.

## 2017-09-29 NOTE — PROGRESS NOTES
Skin assessment per 2 RN's. Skin intact except for a small open on top of right foot. Area clean, no drainage. Bruise on right thigh. Heels intact. alevyn placed over coccyx areas.

## 2017-09-29 NOTE — H&P
HOSPITALIST H&P/CONSULT           NAME:  Carol Cornell   Age:  80 y.o.  :   7/15/1926   MRN:   [id number]  PCP: Bony Jean-Baptiste MD  Consulting MD:  Treatment Team: Attending Provider: Kwame Ospina MD; Primary Nurse: Hero Jorgensen RN  HPI:   History is from grand-daughter in law- Santiago Motley (at bedside)    89T with known ischemic cardiomyopathy, severe systolic CHF with EF ~51%, s/p AICD/PM placement, HTN and HLD who lives with grandson and grand daughter-in-law presented to the ED due to increasing confusion and persistent cough  The confusion was described as visual and auditory hallucinations. Patient also reportedly had been coughing up yellowish sputum, was seen by PCP 1 week ago, treated with a course of Doxycycline, no significant improvement reported. No fever or chills noted, No SOB was noted as well  Pt's lasix had been increased from 3x/week to every other day after patient gained 2lbs over her baseline weight of ~125KG? Candance Huger She also had been eating poorly for the last 2 days. Patient has poor fluid intake at baseline. Other complaints noted by daughter in law is very poor sleep. Review of papers with vital from home shows baseline O2 on RA 93%.    In the ER patient was hypoxic and was placed on NIPPV with improvement in Oxygenation      Complete ROS unable to obtain, patient on biPAP, not answering questions, sleeping    Past Medical History:   Diagnosis Date    AICD (automatic cardioverter/defibrillator) present     Anemia 10/25/2012    Aortic valve regurgitation     Arrhythmia     blessing, pacemaker, a fib    CAD (coronary artery disease) 2012    stent    CHF (congestive heart failure) ([address] 2012    CHF (congestive heart failure) ([address] 2012    Chronic kidney disease, stage II (mild)     Chronic systolic heart failure ([facility] 2013    Diverticulitis     Encounter for long-term (current) use of other medications 2012    Esophagitis     EGD    Falls     Femur fracture, left ([facility] 2016    Fracture of left radius 2016    left radius and ulna fx    Fracture of wrist     left/pins    HLD (hyperlipidemia) 2012    HTN (hypertension) 2012    pt denies    Insomnia     Ischemic cardiomyopathy, chronic     Left bundle branch block (LBBB)     Osteoporosis 2012    S/P PTCA (percutaneous transluminal coronary angioplasty)     Sigmoid stricture ([facility]     Dr. Kevin Renee    Stiffness in joint     left hip, left wrist    Varicose vein     surg/ Dr. Jordana De La Cruz      Past Surgical History:   Procedure Laterality Date    HX CYST REMOVAL      right hand    HX GYN      hysterectomy, bladder sling    HX HIP REPLACEMENT      after fracture    HX IMPLANTABLE CARDIOVERTER DEFIBRILLATOR      HX ORTHOPAEDIC      lt hand    HX PTCA          Prior to Admission Medications   Prescriptions Last Dose Informant Patient Reported? Taking?   amiodarone (CORDARONE) 200 mg tablet   No No   Sig: Take 0.5 Tabs by mouth every morning. aspirin delayed-release 81 mg tablet   No No   Sig: Take 1 Tab by mouth daily. carvedilol (COREG) 3.125 mg tablet   No No   Sig: Take 1 Tab by mouth daily. cholecalciferol, VITAMIN D3, (VITAMIN D3) 5,000 unit tab tablet   No No   Sig: Take 1 Tab by mouth daily. clopidogrel (PLAVIX) 75 mg tab   No No   Sig: Take 1 Tab by mouth daily. doxycycline (ADOXA) 100 mg tablet   No No   Sig: Take 1 Tab by mouth two (2) times a day for 7 days. May use generic substitute   ferrous sulfate 325 mg (65 mg iron) tablet   No No   Sig: Take 1 Tab by mouth Daily (before breakfast). furosemide (LASIX) 20 mg tablet   No No   Sig: Take 1 tablet every other day.    nitroglycerin (NITROSTAT) 0.4 mg SL tablet   No No   Si Tab by SubLINGual route every five (5) minutes as needed for Chest Pain. Facility-Administered Medications: None     Allergies   Allergen Reactions    Pcn [Penicillins] Unknown (comments)     Segundo Rubi at [facility] no allergies listed        Social History   Substance Use Topics    Smoking status: Never Smoker    Smokeless tobacco: Never Used    Alcohol use Yes      Comment: occasional        History reviewed. No pertinent family history. Objective:     Visit Vitals    /57    Pulse 74    Temp 97.8 °F (36.6 °C)    Resp 22    Ht 5' 3\" (1.6 m)    Wt 56.7 kg (125 lb)    SpO2 95%    BMI 22.14 kg/m2      Temp (24hrs), Av.8 °F (36.6 °C), Min:97.8 °F (36.6 °C), Max:97.8 °F (36.6 °C)    Oxygen Therapy  O2 Sat (%): 95 % (17 1508)  Pulse via Oximetry: 74 beats per minute (17 1508)  O2 Device: BIPAP (17 1340)  O2 Flow Rate (L/min): 2 l/min (17 1308)  FIO2 (%): 30 % (17 1340)    Physical Exam:  General:    Sleepy,  no distress, appears stated age. Head:   Normocephalic, without obvious abnormality, atraumatic. Eyes:   Anicteric sclera, clear conjunctiva, EOMI  Mouth:   Unable to assess  Nose:  Nares normal. No drainage or sinus tenderness. Lungs:   Limited by patient participation but from what can be auscultated mostly clear  Heart:   Regular rate and rhythm,  no murmur, rub or gallop. Abdomen:   Soft, non-tender. Not distended. Bowel sounds normal.   Extremities: No cyanosis.  2+ pedal edema, few subcutaneous bruising on LEs  Skin:     Thin skin, No rashes   Not Jaundiced  Neurologic: Sleepy, does not fully follow instructions,   Psych:  Unable to assess      Data Review:   Recent Results (from the past 24 hour(s))   EKG, 12 LEAD, INITIAL    Collection Time: 17 11:04 AM   Result Value Ref Range    Ventricular Rate 79 BPM    Atrial Rate 79 BPM    P-R Interval 144 ms    QRS Duration 174 ms    Q-T Interval 466 ms    QTC Calculation (Bezet) 534 ms    Calculated P Axis 91 degrees    Calculated R Axis -87 degrees    Calculated T Axis 58 degrees    Diagnosis       Serial comparison not performed, all previous tracings are of poor data   quality  NSR WITH V PACING   MARKED BASELINE WANDER   No significant change was found  Confirmed by OLESYA FINK (), AMENA SIMON ([id number]) on 9/29/2017 3:16:04 PM     CBC WITH AUTOMATED DIFF    Collection Time: 09/29/17 11:16 AM   Result Value Ref Range    WBC 8.3 4.3 - 11.1 K/uL    RBC 3.93 (L) 4.05 - 5.25 M/uL    HGB 13.0 11.7 - 15.4 g/dL    HCT 41.0 35.8 - 46.3 %    .3 (H) 79.6 - 97.8 FL    MCH 33.1 (H) 26.1 - 32.9 PG    MCHC 31.7 31.4 - 35.0 g/dL    RDW 18.3 (H) 11.9 - 14.6 %    PLATELET 519 496 - 050 K/uL    MPV 11.5 10.8 - 14.1 FL    DF AUTOMATED      NEUTROPHILS 88 (H) 43 - 78 %    LYMPHOCYTES 6 (L) 13 - 44 %    MONOCYTES 6 4.0 - 12.0 %    EOSINOPHILS 0 (L) 0.5 - 7.8 %    BASOPHILS 0 0.0 - 2.0 %    IMMATURE GRANULOCYTES 0.4 0.0 - 5.0 %    ABS. NEUTROPHILS 7.3 1.7 - 8.2 K/UL    ABS. LYMPHOCYTES 0.5 0.5 - 4.6 K/UL    ABS. MONOCYTES 0.5 0.1 - 1.3 K/UL    ABS. EOSINOPHILS 0.0 0.0 - 0.8 K/UL    ABS. BASOPHILS 0.0 0.0 - 0.2 K/UL    ABS. IMM. GRANS. 0.0 0.0 - 0.5 K/UL   METABOLIC PANEL, COMPREHENSIVE    Collection Time: 09/29/17 11:16 AM   Result Value Ref Range    Sodium 143 136 - 145 mmol/L    Potassium 4.7 3.5 - 5.1 mmol/L    Chloride 108 (H) 98 - 107 mmol/L    CO2 26 21 - 32 mmol/L    Anion gap 9 7 - 16 mmol/L    Glucose 155 (H) 65 - 100 mg/dL    BUN 68 (H) 8 - 23 MG/DL    Creatinine 2.15 (H) 0.6 - 1.0 MG/DL    GFR est AA 28 (L) >60 ml/min/1.73m2    GFR est non-AA 23 (L) >60 ml/min/1.73m2    Calcium 9.5 8.3 - 10.4 MG/DL    Bilirubin, total 1.3 (H) 0.2 - 1.1 MG/DL    ALT (SGPT) 161 (H) 12 - 65 U/L    AST (SGOT) 165 (H) 15 - 37 U/L    Alk.  phosphatase 139 (H) 50 - 136 U/L    Protein, total 6.9 6.3 - 8.2 g/dL    Albumin 3.4 3.2 - 4.6 g/dL    Globulin 3.5 2.3 - 3.5 g/dL    A-G Ratio 1.0 (L) 1.2 - 3.5     BNP    Collection Time: 09/29/17 11:16 AM   Result Value Ref Range    BNP 5536 pg/mL   MAGNESIUM    Collection Time: 09/29/17 11:16 AM   Result Value Ref Range    Magnesium 2.4 1.8 - 2.4 mg/dL   POC LACTIC ACID    Collection Time: 09/29/17 11:21 AM   Result Value Ref Range    Lactic Acid (POC) 1.9 0.5 - 1.9 mmol/L   BLOOD GAS, ARTERIAL    Collection Time: 09/29/17 12:02 PM   Result Value Ref Range    pH 7.35 7.35 - 7.45      PCO2 38 35 - 45 mmHg    PO2 56 (L) 80 - 105 mmHg    BICARBONATE 21 (L) 22 - 26 mmol/L    BASE DEFICIT 4.5 (H) 0 - 2 mmol/L    TOTAL HEMOGLOBIN 13.8 11.7 - 15.0 GM/DL    O2 SAT 86 (L) 92 - 98.5 %    Arterial O2 Hgb 85.2 (L) 94 - 97 %    CARBOXYHEMOGLOBIN 0.7 0.5 - 1.5 %    METHEMOGLOBIN 0.3 0.0 - 1.5 %    DEOXYHEMOGLOBIN 14 (H) 0.0 - 5.0 %    SITE RB     ALLENS TEST POSITIVE      MODE RA     Respiratory comment: dr Rocio Mahan at 9 29 2017 12 06 16 PM. Read back. INFLUENZA A & B AG (RAPID TEST)    Collection Time: 09/29/17 12:07 PM   Result Value Ref Range    Influenza A Ag NEGATIVE  NEG      Influenza B Ag NEGATIVE  NEG       EKG personally reviewed, paced rhythm, no acute ischemic changes    Imaging Martha Dines /Studies     Xr Chest Port    Result Date: 9/29/2017  IMPRESSION: Cardiomegaly, clear lung fields, pacemaker    Personally reviewed chest XR: Right hilar opacity, concerning for PNA. Assessment and Plan: Active Hospital Problems    Diagnosis Date Noted    Acute respiratory failure with hypoxemia (HCC) 09/29/2017    Pneumonia involving right lung 09/29/2017    Chronic systolic heart failure ([facility] 07/28/2017    Acute renal failure superimposed on stage 3 chronic kidney disease (HCC)     Left bundle branch block (LBBB)     HTN (hypertension) 09/04/2012       PLAN   Admit to ICU due to need for biPAP and respiratory failure  Repeat ABG this afternoon,  Attempt to wean BiPAP  Get Noncontrast CT chest to assess right lung findings. Levofloxacin 750mg Q48 to treat suspected Pneumonia    Give gentle IVF for ASHIA.  Pt SrCr is elevated, Hb 2g more than baseline, suspect hemoconcentration. NS @50ml/hr, EF 20%  Hold lasix  Get Cardio Consult given advance heart failure.      Cont Amiodarone, Coreg and Plavix,   Get UA  Keys for I/O given renal failure and advanced CHF    Code Status: full per family  DVT prophylaxis: heparin  Anticipated discharge: ~3 days, once clinically improved      Signed By: Loco Vuong MD     September 29, 2017

## 2017-09-29 NOTE — IP AVS SNAPSHOT
Kirsten Gaviria 
 
 
 [address] 
[phone] Patient: Amee Solis MRN: [id number] VRS:2/28/7879 You are allergic to the following Allergen Reactions Pcn (Penicillins) Unknown (comments) Chemo [facility]avez at [facility] no allergies listed Recent Documentation Height Weight Breastfeeding? BMI OB Status Smoking Status 1.6 m 65.3 kg No 25.51 kg/m2 Postmenopausal Never Smoker Emergency Contacts Name Discharge Info Relation Home Work Mobile Shaylee Hernadez(Grand Daughter In [place])  Other Relative [6] [phone] About your hospitalization You were admitted on:  September 29, 2017 You last received care in the:  [address] You were discharged on:  October 5, 2017 Unit phone number:  [phone] Why you were hospitalized Your primary diagnosis was:  Acute Respiratory Failure With Hypoxemia (Hcc) Your diagnoses also included:  Acute Renal Failure Superimposed On Stage 3 Chronic Kidney Disease (Hcc), Left Bundle Branch Block (Lbbb), Chronic Systolic Heart Failure (Hcc), Htn (Hypertension), Bilateral Pneumonia, Acute Metabolic Encephalopathy Providers Seen During Your Hospitalizations Provider Role Specialty Primary office phone Juan Marshall MD Attending Provider Emergency Medicine [phone] Jameson Cantu MD Attending Provider Internal Medicine [phone] Your Primary Care Physician (PCP) Primary Care Physician Office Phone Office Fax [address] [phone] Follow-up Information Follow up With Details Comments Contact Info [id number]  [address] [place] [address] 
[phone] Namita Gonzalez MD  Follow up 3-5 days after discharge [address] 
[address] 11[facility] 
[phone] Your Appointments Friday October 06, 2017  4:00 PM EDT Office Visit with Marisela Officer, MD  
[address] (358 [facility]) [address]  
[address] [place] [address]  
[phone] Tuesday October 10, 2017  8:30 AM EDT MEDICARE WELLNESS with Isrrael Tello MD, [facility] [address] [place] [id number] [phone] Monday October 16, 2017 12:00 PM EDT  
REMOTE DEVICE CHECK ORDERS ONLY ENCOUNTER with ABIGAIL REMOTE AICD 62 [facility] CARDIOLOGY [place] OFFICE (872 [facility]) [address] 
[address]  
[phone] Please remember THIS REMOTE CHECK IS COMPLETED FROM HOME - YOU WILL NOT COME TO THE OFFICE FOR THIS APPOINTMENT. In preparation for this check, please ensure your monitor box is working appropriately. If your monitor requires you to send a transmission, please make sure it is sent by 11:00AM on this day so we can have it processed and resulted to your doctor without delay. If you have a question or problem with the monitor box, please contact your respective company:   [address] [place] [address]/[employer] - [phone]  [employer] - [phone]  [employer] - [phone]  [employer] [facility] [phone]  If you have any further questions or need to move this appointment, we are happy to help and can be reached at [phone]. Current Discharge Medication List  
  
CONTINUE these medications which have NOT CHANGED Dose & Instructions Dispensing Information Comments Morning Noon Evening Bedtime  
 amiodarone 200 mg tablet Commonly known as:  CORDARONE Your last dose was: Your next dose is:    
   
   
 Dose:  100 mg Take 0.5 Tabs by mouth every morning. Quantity:  15 Tab Refills:  11  
     
   
   
   
  
 aspirin delayed-release 81 mg tablet Your last dose was: Your next dose is:    
   
   
 Dose:  81 mg Take 1 Tab by mouth daily. Quantity:  30 Tab Refills:  11  
     
   
   
   
  
 carvedilol 3.125 mg tablet Commonly known as:  Silvia Dome Your last dose was: Your next dose is:    
   
   
 Dose:  3.125 mg Take 1 Tab by mouth daily. Quantity:  30 Tab Refills:  11  
     
   
   
   
  
 cholecalciferol (VITAMIN D3) 5,000 unit Tab tablet Commonly known as:  VITAMIN D3 Your last dose was: Your next dose is:    
   
   
 Dose:  5000 Units Take 1 Tab by mouth daily. Quantity:  90 Tab Refills:  3  
     
   
   
   
  
 clopidogrel 75 mg Tab Commonly known as:  PLAVIX Your last dose was: Your next dose is:    
   
   
 Dose:  75 mg Take 1 Tab by mouth daily. Quantity:  30 Tab Refills:  11  
     
   
   
   
  
 ferrous sulfate 325 mg (65 mg iron) tablet Your last dose was: Your next dose is:    
   
   
 Dose:  325 mg Take 1 Tab by mouth Daily (before breakfast). Quantity:  90 Tab Refills:  1  
     
   
   
   
  
 nitroglycerin 0.4 mg SL tablet Commonly known as:  NITROSTAT Your last dose was: Your next dose is:    
   
   
 Dose:  0.4 mg  
1 Tab by SubLINGual route every five (5) minutes as needed for Chest Pain. Quantity:  25 Tab Refills:  11 STOP taking these medications   
 doxycycline 100 mg tablet Commonly known as:  ADOXA  
   
  
 furosemide 20 mg tablet Commonly known as:  LASIX Discharge Instructions DISCHARGE SUMMARY from Nurse The following personal items are in your possession at time of discharge: 
 
Dental Appliances: None Visual Aid: At bedside, Glasses Home Medications: None Jewelry: Geri Maizes Clothing: None Other Valuables: None Personal Items Sent to Safe: none PATIENT INSTRUCTIONS: 
 
 After general anesthesia or intravenous sedation, for 24 hours or while taking prescription Narcotics: · Limit your activities · Do not drive and operate hazardous machinery · Do not make important personal or business decisions · Do  not drink alcoholic beverages · If you have not urinated within 8 hours after discharge, please contact your surgeon on call. Report the following to your surgeon: 
· Excessive pain, swelling, redness or odor of or around the surgical area · Temperature over 100.5 · Nausea and vomiting lasting longer than 4 hours or if unable to take medications · Any signs of decreased circulation or nerve impairment to extremity: change in color, persistent  numbness, tingling, coldness or increase pain · Any questions What to do at Home: 
Recommended activity: Activity as tolerated, per MD 
 
If you experience any of the following symptoms fever>101, pain unrelieved with medication, nausea/vomiting, shortness of breath, dizziness/fainting, chest pain. , please follow up with your doctor. *  Please give a list of your current medications to your Primary Care Provider. *  Please update this list whenever your medications are discontinued, doses are 
    changed, or new medications (including over-the-counter products) are added. *  Please carry medication information at all times in case of emergency situations. These are general instructions for a healthy lifestyle: No smoking/ No tobacco products/ Avoid exposure to second hand smoke Surgeon General's Warning:  Quitting smoking now greatly reduces serious risk to your health. Obesity, smoking, and sedentary lifestyle greatly increases your risk for illness A healthy diet, regular physical exercise & weight monitoring are important for maintaining a healthy lifestyle You may be retaining fluid if you have a history of heart failure or if you experience any of the following symptoms:  Weight gain of 3 pounds or more overnight or 5 pounds in a week, increased swelling in our hands or feet or shortness of breath while lying flat in bed. Please call your doctor as soon as you notice any of these symptoms; do not wait until your next office visit. Recognize signs and symptoms of STROKE: 
 
F-face looks uneven A-arms unable to move or move unevenly S-speech slurred or non-existent T-time-call 911 as soon as signs and symptoms begin-DO NOT go Back to bed or wait to see if you get better-TIME IS BRAIN. Warning Signs of HEART ATTACK Call 911 if you have these symptoms: 
? Chest discomfort. Most heart attacks involve discomfort in the center of the chest that lasts more than a few minutes, or that goes away and comes back. It can feel like uncomfortable pressure, squeezing, fullness, or pain. ? Discomfort in other areas of the upper body. Symptoms can include pain or discomfort in one or both arms, the back, neck, jaw, or stomach. ? Shortness of breath with or without chest discomfort. ? Other signs may include breaking out in a cold sweat, nausea, or lightheadedness. Don't wait more than five minutes to call [address]! Fast action can save your life. Calling 911 is almost always the fastest way to get lifesaving treatment. Emergency Medical Services staff can begin treatment when they arrive  up to an hour sooner than if someone gets to the hospital by car. The discharge information has been reviewed with the patient. The patient verbalized understanding. Discharge medications reviewed with the patient and appropriate educational materials and side effects teaching were provided. Urinary Tract Infection in Women: Care Instructions Your Care Instructions A urinary tract infection, or UTI, is a general term for an infection · Symptoms such as fever, chills, nausea, or vomiting get worse or appear for the first time. · You have new pain in your back just below your rib cage. This is called flank pain. · There is new blood or pus in your urine. · You have any problems with your antibiotic medicine. Watch closely for changes in your health, and be sure to contact your doctor if: 
· You are not getting better after taking an antibiotic for 2 days. · Your symptoms go away but then come back. Where can you learn more? Go to http://liu-keith.info/. Enter W431 in the search box to learn more about \"Urinary Tract Infection in Women: Care Instructions. \" Current as of: November 28, 2016 Content Version: 11.3 © 8466-4428 [employer]. Care instructions adapted under license by [employer] (which disclaims liability or warranty for this information). If you have questions about a medical condition or this instruction, always ask your healthcare professional. [place] [address] any warranty or liability for your use of this information. Discharge Orders Procedure Order Date Status Priority Quantity Spec Type Associated Dx PORTABLE OXYGEN CONCENTRATOR 10/05/17 1235 Normal Routine 1 Schedule Instructions:  Patient will need home O2 at discharge, as her O2 saturation dropped to 87% with ambulation. ACO Transitions of Care Introducing [employer] [address] Raeann Patiño offers a voluntary care coordination program to provide high quality service and care to [facility] fee-for-service beneficiaries. Pia Gomez was designed to help you enhance your health and well-being through the following services: ? Transitions of Care  support for individuals who are transitioning from one care setting to another (example: Hospital to home). ?  Chronic and Complex Care Coordination  support for individuals and caregivers of those with serious or chronic illnesses or with more than one chronic (ongoing) condition and those who take a number of different medications. If you meet specific medical criteria, a [address] [place] [address] Rd may call you directly to coordinate your care with your primary care physician and your other care providers. For questions about the [facility] programs, please, contact your physicians office. For general questions or additional information about Accountable Care Organizations: 
Please visit www.medicare.gov/acos. html or call 1-800-MEDICARE (2-617.923.6086) TTY users should call 5-626.277.3000. Introducing [facility]! Dear Tequila Schneider: Thank you for requesting a Smartvue account. Our records indicate that you already have an active Smartvue account. You can access your account anytime at [email or web address] Did you know that you can access your hospital and ER discharge instructions at any time in Smartvue? You can also review all of your test results from your hospital stay or ER visit. Additional Information If you have questions, please visit the Frequently Asked Questions section of the Smartvue website at [email or web address] Remember, Smartvue is NOT to be used for urgent needs. For medical emergencies, dial 911. Now available from your iPhone and Android! General Information Please provide this summary of care documentation to your next provider. Patient Signature:  ____________________________________________________________ Date:  ____________________________________________________________  
  
Citlaly Schumacher Provider Signature:  ____________________________________________________________ Date:  ____________________________________________________________

## 2017-09-29 NOTE — PROGRESS NOTES
Nursing report to [facility].

## 2017-09-29 NOTE — PROGRESS NOTES
TRANSFER - IN REPORT:    Verbal report received from [address] [place] [address] on Maggy Richardson  being received from ED for routine progression of care      Report consisted of patients Situation, Background, Assessment and   Recommendations(SBAR). Information from the following report(s) SBAR, Kardex, Intake/Output, MAR, Recent Results and Med Rec Status was reviewed with the receiving nurse. Opportunity for questions and clarification was provided. Assessment completed upon patients arrival to unit and care assumed.

## 2017-09-30 PROBLEM — G93.41 ACUTE METABOLIC ENCEPHALOPATHY: Status: RESOLVED | Noted: 2017-01-01 | Resolved: 2017-01-01

## 2017-09-30 PROBLEM — G93.41 ACUTE METABOLIC ENCEPHALOPATHY: Status: ACTIVE | Noted: 2017-01-01

## 2017-09-30 NOTE — PROGRESS NOTES
Pure wick malpositioned and leaked in bed, whole bed pad soaked, unable to measure. Patient cleaned and repositioned, pure wick repositioned, brief placed in case of further leaking. Remains very drowsy on BiPAP. VSS. Will continue to monitor.

## 2017-09-30 NOTE — PROGRESS NOTES
Pt coughing up copious tan/brown and thick sputum. Family member states she coughs up this type sputum often. DuoNeb tx given. 09/30/17 1032   Oxygen Therapy   O2 Sat (%) 97 %   Pulse via Oximetry 79 beats per minute   O2 Device Nasal cannula   O2 Flow Rate (L/min) 3 l/min   Pre-Treatment   Breathing Pattern Regular   Cough Congested;Productive   Sputum amount Copious   Sputum color/odor Tan;Brown   Sputum consistency Thick   Sputum method obtained Spontaneous   Breath Sounds Bilateral Coarse; Wheezing;Diminished   Left Breath Sounds Lower;Diminished   Right Breath Sounds Lower;Diminished   Pulse 79   SpO2 97 %   Respirations 17   Procedures   $$ Initial Procedures Aerosol   $$ Demo/Evaluation Yes   Delivery Source Mask   Aerosolized Medications DuoNeb

## 2017-09-30 NOTE — PROGRESS NOTES
Report received from Ronel Heard, [facility]. Patient sleeping with BIPAP mask, very drowsy, opens eyes briefly to voice, some command following with repetition of command, most words incomprehensible. Paced on monitor, rate 70s; /60; O2 sat 97%; lung sounds diminished bilaterally. Pure wick in place with 50mL dark yellow UOP, sample sent to lab for UA. BLE with 3+ pitting edema, pedal pulses palpable, small tear/abraison to left shin, scattered bruising to both legs. Sacrum intact, allevyn in place. No family present at this time. Will continue to monitor.

## 2017-09-30 NOTE — PROGRESS NOTES
Hospitalist Progress Note        Subjective:   Daily Progress Note: 2017 10:28 AM    89F with known ischemic cardiomyopathy, severe systolic CHF with EF ~53%, s/p AICD/PM placement, HTN and HLD who lives with grandson and grand daughter-in-law presented to the ED due to increasing confusion and persistent cough. Workup revealed Respiratory failure with hypoxia requiring 24hrs on BiPAP, CT chest showed right sided PNA. Patient was admitted to the ICU.    17: Patient seen this morning. She is doing much better, awake and alert. Conversant appropriately, She voiced no specific complaints. Daughter in law at bedside, attested that her mentation is much better and is pretty much at baseline. Daughter noted some dysphagia at home recently but coughing spells had started prior to that  Patient observed to have hacking cough with sputum production, color obscured but some breakfast patient had just has, no blood      Review of Systems  -10 systems reviewed and are negative other than stated in subjective data above    Objective:     Visit Vitals    /51    Pulse 74    Temp 96 °F (35.6 °C)    Resp 23    Ht 5' 3\" (1.6 m)    Wt 61.1 kg (134 lb 11.2 oz)    SpO2 97%    Breastfeeding No    BMI 23.86 kg/m2    O2 Flow Rate (L/min): 3 l/min O2 Device: Nasal cannula    Temp (24hrs), Av.9 °F (36.1 °C), Min:96 °F (35.6 °C), Max:97.8 °F (36.6 °C)          1901 -  0700  In: 645.8 [I.V.:645.8]  Out: 350 [Urine:350]    General appearance: alert, cooperative, no distress, appears stated age  Neck: supple  Lungs: b/l course rales, few rhonchi, no definite wheezing, work of breathing ok, speaks in full sentences, occasional coughing spells  Heart: regular rate and rhythm, S1, S2 normal  Abdomen: soft, non-tender.  Bowel sounds normal. No masses,  no organomegaly  Extremities: extremities normal, atraumatic,much improved edema ~1+ today  Skin: couple of bruise on her legs, no jaundice, no rashes  Neurologic: Grossly normal  Psych: AAOx person and place, pleasant but feisty mood and affect, fair insight in to current condition once explained        Data Review    Recent Results (from the past 24 hour(s))   EKG, 12 LEAD, INITIAL    Collection Time: 09/29/17 11:04 AM   Result Value Ref Range    Ventricular Rate 79 BPM    Atrial Rate 79 BPM    P-R Interval 144 ms    QRS Duration 174 ms    Q-T Interval 466 ms    QTC Calculation (Bezet) 534 ms    Calculated P Axis 91 degrees    Calculated R Axis -87 degrees    Calculated T Axis 58 degrees    Diagnosis       Serial comparison not performed, all previous tracings are of poor data   quality  NSR WITH V PACING   MARKED BASELINE WANDER   No significant change was found  Confirmed by OLESYA FINK (), AMENA SIMON ([id number]) on 9/29/2017 3:16:04 PM     CBC WITH AUTOMATED DIFF    Collection Time: 09/29/17 11:16 AM   Result Value Ref Range    WBC 8.3 4.3 - 11.1 K/uL    RBC 3.93 (L) 4.05 - 5.25 M/uL    HGB 13.0 11.7 - 15.4 g/dL    HCT 41.0 35.8 - 46.3 %    .3 (H) 79.6 - 97.8 FL    MCH 33.1 (H) 26.1 - 32.9 PG    MCHC 31.7 31.4 - 35.0 g/dL    RDW 18.3 (H) 11.9 - 14.6 %    PLATELET 594 702 - 055 K/uL    MPV 11.5 10.8 - 14.1 FL    DF AUTOMATED      NEUTROPHILS 88 (H) 43 - 78 %    LYMPHOCYTES 6 (L) 13 - 44 %    MONOCYTES 6 4.0 - 12.0 %    EOSINOPHILS 0 (L) 0.5 - 7.8 %    BASOPHILS 0 0.0 - 2.0 %    IMMATURE GRANULOCYTES 0.4 0.0 - 5.0 %    ABS. NEUTROPHILS 7.3 1.7 - 8.2 K/UL    ABS. LYMPHOCYTES 0.5 0.5 - 4.6 K/UL    ABS. MONOCYTES 0.5 0.1 - 1.3 K/UL    ABS. EOSINOPHILS 0.0 0.0 - 0.8 K/UL    ABS. BASOPHILS 0.0 0.0 - 0.2 K/UL    ABS. IMM.  GRANS. 0.0 0.0 - 0.5 K/UL   METABOLIC PANEL, COMPREHENSIVE    Collection Time: 09/29/17 11:16 AM   Result Value Ref Range    Sodium 143 136 - 145 mmol/L    Potassium 4.7 3.5 - 5.1 mmol/L    Chloride 108 (H) 98 - 107 mmol/L    CO2 26 21 - 32 mmol/L    Anion gap 9 7 - 16 mmol/L    Glucose 155 (H) 65 - 100 mg/dL    BUN 68 (H) 8 - 23 MG/DL Creatinine 2.15 (H) 0.6 - 1.0 MG/DL    GFR est AA 28 (L) >60 ml/min/1.73m2    GFR est non-AA 23 (L) >60 ml/min/1.73m2    Calcium 9.5 8.3 - 10.4 MG/DL    Bilirubin, total 1.3 (H) 0.2 - 1.1 MG/DL    ALT (SGPT) 161 (H) 12 - 65 U/L    AST (SGOT) 165 (H) 15 - 37 U/L    Alk. phosphatase 139 (H) 50 - 136 U/L    Protein, total 6.9 6.3 - 8.2 g/dL    Albumin 3.4 3.2 - 4.6 g/dL    Globulin 3.5 2.3 - 3.5 g/dL    A-G Ratio 1.0 (L) 1.2 - 3.5     BNP    Collection Time: 09/29/17 11:16 AM   Result Value Ref Range    BNP 5536 pg/mL   MAGNESIUM    Collection Time: 09/29/17 11:16 AM   Result Value Ref Range    Magnesium 2.4 1.8 - 2.4 mg/dL   POC LACTIC ACID    Collection Time: 09/29/17 11:21 AM   Result Value Ref Range    Lactic Acid (POC) 1.9 0.5 - 1.9 mmol/L   BLOOD GAS, ARTERIAL    Collection Time: 09/29/17 12:02 PM   Result Value Ref Range    pH 7.35 7.35 - 7.45      PCO2 38 35 - 45 mmHg    PO2 56 (L) 80 - 105 mmHg    BICARBONATE 21 (L) 22 - 26 mmol/L    BASE DEFICIT 4.5 (H) 0 - 2 mmol/L    TOTAL HEMOGLOBIN 13.8 11.7 - 15.0 GM/DL    O2 SAT 86 (L) 92 - 98.5 %    Arterial O2 Hgb 85.2 (L) 94 - 97 %    CARBOXYHEMOGLOBIN 0.7 0.5 - 1.5 %    METHEMOGLOBIN 0.3 0.0 - 1.5 %    DEOXYHEMOGLOBIN 14 (H) 0.0 - 5.0 %    SITE RB     ALLENS TEST POSITIVE      MODE RA     Respiratory comment: dr Nisha Dominique at 9 29 2017 12 06 16 PM. Read back.     CULTURE, BLOOD    Collection Time: 09/29/17 12:05 PM   Result Value Ref Range    Special Requests: RIGHT  ARM        Culture result: NO GROWTH AFTER 19 HOURS     CULTURE, BLOOD    Collection Time: 09/29/17 12:07 PM   Result Value Ref Range    Special Requests: RIGHT  Antecubital        Culture result: NO GROWTH AFTER 19 HOURS     INFLUENZA A & B AG (RAPID TEST)    Collection Time: 09/29/17 12:07 PM   Result Value Ref Range    Influenza A Ag NEGATIVE  NEG      Influenza B Ag NEGATIVE  NEG     POC TROPONIN-I    Collection Time: 09/29/17  5:45 PM   Result Value Ref Range    Troponin-I (POC) 0.01 (L) 0.02 - 0.05 ng/ml   URINALYSIS W/ RFLX MICROSCOPIC    Collection Time: 09/29/17  7:52 PM   Result Value Ref Range    Color YELLOW      Appearance CLOUDY      Specific gravity 1.013 1.001 - 1.023      pH (UA) 5.5 5.0 - 9.0      Protein 30 (A) NEG mg/dL    Glucose NEGATIVE  mg/dL    Ketone NEGATIVE  NEG mg/dL    Bilirubin NEGATIVE  NEG      Blood MODERATE (A) NEG      Urobilinogen 1.0 0.2 - 1.0 EU/dL    Nitrites NEGATIVE  NEG      Leukocyte Esterase MODERATE (A) NEG      WBC  0 /hpf    RBC 0-3 0 /hpf    Epithelial cells 0-3 0 /hpf    Bacteria 0 0 /hpf    Casts 0 0 /lpf   METABOLIC PANEL, BASIC    Collection Time: 09/30/17  3:50 AM   Result Value Ref Range    Sodium 147 (H) 136 - 145 mmol/L    Potassium 3.7 3.5 - 5.1 mmol/L    Chloride 111 (H) 98 - 107 mmol/L    CO2 27 21 - 32 mmol/L    Anion gap 9 7 - 16 mmol/L    Glucose 83 65 - 100 mg/dL    BUN 65 (H) 8 - 23 MG/DL    Creatinine 2.07 (H) 0.6 - 1.0 MG/DL    GFR est AA 29 (L) >60 ml/min/1.73m2    GFR est non-AA 24 (L) >60 ml/min/1.73m2    Calcium 8.7 8.3 - 10.4 MG/DL   MAGNESIUM    Collection Time: 09/30/17  3:50 AM   Result Value Ref Range    Magnesium 2.1 1.8 - 2.4 mg/dL   CBC WITH AUTOMATED DIFF    Collection Time: 09/30/17  3:50 AM   Result Value Ref Range    WBC 5.4 4.3 - 11.1 K/uL    RBC 3.72 (L) 4.05 - 5.25 M/uL    HGB 12.1 11.7 - 15.4 g/dL    HCT 38.4 35.8 - 46.3 %    .2 (H) 79.6 - 97.8 FL    MCH 32.5 26.1 - 32.9 PG    MCHC 31.5 31.4 - 35.0 g/dL    RDW 18.1 (H) 11.9 - 14.6 %    PLATELET 748 903 - 586 K/uL    MPV 10.6 (L) 10.8 - 14.1 FL    DF AUTOMATED      NEUTROPHILS 81 (H) 43 - 78 %    LYMPHOCYTES 10 (L) 13 - 44 %    MONOCYTES 7 4.0 - 12.0 %    EOSINOPHILS 1 0.5 - 7.8 %    BASOPHILS 1 0.0 - 2.0 %    IMMATURE GRANULOCYTES 0.4 0.0 - 5.0 %    ABS. NEUTROPHILS 4.4 1.7 - 8.2 K/UL    ABS. LYMPHOCYTES 0.5 0.5 - 4.6 K/UL    ABS. MONOCYTES 0.4 0.1 - 1.3 K/UL    ABS. EOSINOPHILS 0.0 0.0 - 0.8 K/UL    ABS. BASOPHILS 0.0 0.0 - 0.2 K/UL    ABS. IMM.  GRANS. 0.0 0.0 - 0.5 K/UL     Ct Chest Wo Cont    Result Date: 9/29/2017  IMPRESSION: Cardiomegaly, pacemaker, infiltrates within the right middle lobe and bilateral lower lobes    Xr Chest Port    Result Date: 9/29/2017  IMPRESSION: Cardiomegaly, clear lung fields, pacemaker        Assessment/Plan:     Principal Problem:    Acute respiratory failure with hypoxemia ([facility] (9/29/2017)    Active Problems:    HTN (hypertension) (9/4/2012)      Acute renal failure superimposed on stage 3 chronic kidney disease (HCC) ()      Left bundle branch block (LBBB) ()      Chronic systolic heart failure ([facility] (7/28/2017)      Bilateral pneumonia (9/29/2017)          Plan:   Patient medically stable to be transferred to medical unit for continued care    B/l Pneumonia, possible aspiration  Change antibiotics to Ceftriaxone (pt tolerated Ancef in the past) and Metronidazole  Attempt to get sputum of available and culture    Added Duoneb and Anti-tussive    ASHIA on CKD3- slight improvement today  -gently hydrating given Severe systolic CHF with EF ~28% per chart review  - monitor I/O  - changed to 1/2NS due to slight increase in serum sodium    HTN  - BP adequate for age  - cont to hold lasix,   -On Coreg,     Acute respiratory failure due to PNA  - improving  -- now on supplemental O2 by nasal cannula    Chronic systolic CHF  - stable  - not on ACEI/ARB due to CKD  -on Coreg   - PM/AICD in place    H/o Afib and blessing  - on Amiodarone  - has PM in place  - paced rhythm per my review of telemetry review    Acute metabolic encephalopathy due to PNA and hypoxia  -resolved  Full code  DVT Prophylaxis: heparin   Pt is high risk due to PNA, respiratory failure and advanced age.       Signed By: Suhail Van MD     September 30, 2017

## 2017-09-30 NOTE — PROGRESS NOTES
TRANSFER - OUT REPORT:    Verbal report given to CHIN Davidson on Steph Duran  being transferred to [facility]  for routine progression of care       Report consisted of patients Situation, Background, Assessment and   Recommendations(SBAR). Information from the following report(s) SBAR, Intake/Output, MAR, Accordion and Recent Results was reviewed with the receiving nurse. Lines:   Peripheral IV 09/29/17 Left Wrist (Active)   Site Assessment Clean, dry, & intact 9/29/2017  7:13 PM   Phlebitis Assessment 0 9/29/2017  7:13 PM   Infiltration Assessment 0 9/29/2017  7:13 PM   Dressing Status Clean, dry, & intact 9/29/2017  7:13 PM        Opportunity for questions and clarification was provided.       Patient transported with:

## 2017-09-30 NOTE — PROGRESS NOTES
TRANSFER - IN REPORT:    Verbal report received from [address] [place] [address] (name) on Magdiel Herrera  being received from ICU (unit) for routine progression of care      Report consisted of patients Situation, Background, Assessment and   Recommendations(SBAR). Information from the following report(s) SBAR, Kardex and Intake/Output was reviewed with the receiving nurse. Opportunity for questions and clarification was provided. Assessment completed upon patients arrival to unit and care assumed.

## 2017-09-30 NOTE — PROGRESS NOTES
Assessment completed. Pt alert and oriented X4, denies any pain, having difficulty with secretion. Respirations even and unlabored, breath sounds clear but diminished bilaterally, pt on 2L via NC, also with productive cough. Pt with incontinent briefs, pure wick in place per patient/family request as they had it in ICU. Abdomen soft and non tender, bowel sounds active. Bed low and locked, call light within reach, pt advised to call if assistance is needed.

## 2017-09-30 NOTE — PROGRESS NOTES
Problem: Dysphagia (Adult)  Goal: *Acute Goals and Plan of Care (Insert Text)  ST. Pt. Will tolerate mechanical soft/no mixed consistencies, honey thick liquids with no overt s/sx of aspiration/penetration. 2. Pt. Will participate in modified barium swallow with 100% participation to fully evaluate swallow function. LTG: Pt. Will tolerate least restrictive diet without respiratory decline. SPEECH LANGUAGE PATHOLOGY: BEDSIDE SWALLOW NOTE: INITIAL ASSESSMENT     NAME/AGE/GENDER: Polo Garcia is a 80 y.o. female  DATE: 2017  PRIMARY DIAGNOSIS: Acute respiratory failure with hypoxemia (HCC)       ICD-10: Treatment Diagnosis: R13.12 Dysphagia, Oropharyngeal Phase  INTERDISCIPLINARY COLLABORATION: Registered Nurse  PRECAUTIONS/ALLERGIES: Pcn [penicillins]   ASSESSMENT:   Based on the objective data described below, Ms. Armani Mills presents with overt s/sx with thin liquids (per daughter in law who was briefly present, patient has had increasing s/sx with intake for a \"few months\"). Patient with strong throat clearing with ice chip. Immediate coughing episodes with tsp and cup sip thin liquids. Patient with delayed, but strong coughing episode with nectar thick liquids via cup sip. Refusing cup sip of honey thick, but agreed to trial via straw sip. Patient initially refusing intake of all solids, however with MD assistance, took 1 bite of applesauce and 2 bites of soft fruit. No overt s/sx s/p presentations. Patient demonstrates great dislike for PO trials with swallow delay/increased effort with swallow, however this appears behavioral versus pain/neurologically based. Increased mastication time with fruit and decreased cohesion of bolus with mild oral residue. Refused cracker. Patient education (family had left during evaluation) however poor awareness of deficits or benefit of modifications stating \"you are lying, that's not true. Give me ice. \" When SLP explained concern for aspiration, patient stated Shelia Hoang you going to deprive me of ice. \" Again discussed my role as an evaluating therapist and that my recommendations would be relayed to the nursing/MD staff. Patient reluctant, but verbalized understanding. Discussed findings with MD and nursing staff including concern for aspiration given CXR with RML and RLL infiltrates. Recommendation is for mechanical soft textures/no mixed consistencies with honey thick liquids. Sign posted in room. MD in agreement to schedule Modified barium swallow for Monday to further assess pharyngeal function, however SLP will need to follow up for compliance/willingness to participate prior to completion. Patient will benefit from skilled intervention to address the below impairments. ?????? ? ? This section established at most recent assessment??????????  PROBLEM LIST (Impairments causing functional limitations):  1. Oropharyngeal dysphagia with infiltrates on CXR  REHABILITATION POTENTIAL FOR STATED GOALS: FAIR      PLAN OF CARE:   Patient will benefit from skilled intervention to address the following impairments. RECOMMENDATIONS AND PLANNED INTERVENTIONS (Benefits and precautions of therapy have been discussed with the patient.):  · PO:  Mechanical soft  · Liquids:  honey  · no mixed consistencies  MEDICATIONS:  · Crushed in puree  · With Thickened Liquid  COMPENSATORY STRATEGIES/MODIFICATIONS INCLUDING:  · Alternate liquids/solids  · Small sips and bites  OTHER RECOMMENDATIONS (including follow up treatment recommendations): · Family training/education  · Patient education  RECOMMENDED DIET MODIFICATIONS DISCUSSED WITH:  · Nursing  · Patient  FREQUENCY/DURATION: Continue to follow patient 3 times a week as able or until goals met. RECOMMENDED REHABILITATION/EQUIPMENT: (at time of discharge pending progress): Due to the probability of continued deficits (see above) this patient will likely need continued skilled speech therapy after discharge.       SUBJECTIVE:   \"I don't like any of that!\"  History of Present Injury/Illness: Ms. Corazon Lozano  has a past medical history of AICD (automatic cardioverter/defibrillator) present; Anemia (10/25/2012); Aortic valve regurgitation; Arrhythmia; CAD (coronary artery disease) (9/4/2012); CHF (congestive heart failure) ([facility] (9/4/2012); CHF (congestive heart failure) ([facility] (9/4/2012); Chronic kidney disease, stage II (mild); Chronic systolic heart failure ([facility] (4/16/2013); Diverticulitis; Encounter for long-term (current) use of other medications (11/19/2012); Esophagitis (2010); Falls; Femur fracture, left ([facility] (12/02/2016); Fracture of left radius (12/02/2016); Fracture of wrist (2003); HLD (hyperlipidemia) (9/4/2012); HTN (hypertension) (9/4/2012); Insomnia; Ischemic cardiomyopathy, chronic; Left bundle branch block (LBBB); Osteoporosis (9/4/2012); S/P PTCA (percutaneous transluminal coronary angioplasty); Sigmoid stricture ([facility] Stiffness in joint; and Varicose vein (2011). .  She also  has a past surgical history that includes orthopaedic; ptca (2012); cyst removal (2009); gyn; implantable cardioverter defibrillator; and hip replacement. Present Symptoms:    Pain Intensity 1: 0  Current Medications:   No current facility-administered medications on file prior to encounter. Current Outpatient Prescriptions on File Prior to Encounter   Medication Sig Dispense Refill    furosemide (LASIX) 20 mg tablet Take 1 tablet every other day. 15 Tab 11    amiodarone (CORDARONE) 200 mg tablet Take 0.5 Tabs by mouth every morning. 15 Tab 11    carvedilol (COREG) 3.125 mg tablet Take 1 Tab by mouth daily. 30 Tab 11    clopidogrel (PLAVIX) 75 mg tab Take 1 Tab by mouth daily. 30 Tab 11    cholecalciferol, VITAMIN D3, (VITAMIN D3) 5,000 unit tab tablet Take 1 Tab by mouth daily. 90 Tab 3    ferrous sulfate 325 mg (65 mg iron) tablet Take 1 Tab by mouth Daily (before breakfast). 90 Tab 1    aspirin delayed-release 81 mg tablet Take 1 Tab by mouth daily.  30 Tab 11    nitroglycerin (NITROSTAT) 0.4 mg SL tablet 1 Tab by SubLINGual route every five (5) minutes as needed for Chest Pain. 25 Tab 11     [facility]:  [facility]thin liquids                 · History of reflux:  NO  Social History/Home Situation:    Home Environment: Private residence  # Steps to Enter: 1  One/Two Story Residence: One story  Living Alone: No  Support Systems: Child(nkechi)  Patient Expects to be Discharged to[de-identified] Private residence  Current DME Used/Available at Home: Raised toilet seat, Shower chair, Transfer bench, Walker, rolling, Wheelchair      OBJECTIVE:   Respiratory Status:  Nasal cannula  3 l/min  CXR Results:infiltrates within RML and bilateral LL  MRI/CT Results:none noted this admission  Oral Motor Structure/Speech:  Oral-Motor Structure/Motor Speech  Labial: Decreased rate, Decreased seal  Dentition: Natural  Oral Hygiene: good  Lingual: Decreased strength, Decreased rate     Cognitive and Communication Status:  Neurologic State: Alert  Orientation Level: Oriented to person;Oriented to place  Cognition: Decreased attention/concentration  Perception: Appears intact  Perseveration: Perseverates during conversation (on ice chips)  Safety/Judgement: Decreased awareness of environment;Decreased awareness of need for assistance;Decreased awareness of need for safety;Decreased insight into deficits     BEDSIDE SWALLOW EVALUATION  Oral Assessment:  Oral Assessment  Labial: Decreased rate;Decreased seal  Dentition: Natural  Oral Hygiene: good  Lingual: Decreased strength;Decreased rate  P.O. Trials:  Patient Position: upright in bed     The patient was given bite/sip amounts of the following:   Consistency Presented: Thin liquid;Puree;Mixed consistency; Ice chips; Nectar thick liquid;Honey thick liquid  How Presented: Self-fed/presented;SLP-fed/presented;Cup/sip;Spoon;Straw;Successive swallows     ORAL PHASE:  Bolus Acceptance: No impairment  Bolus Formation/Control: Impaired  Propulsion: Delayed (# of seconds)  Type of Impairment: Delayed;Mastication  Oral Residue: Less than 10% of bolus     PHARYNGEAL PHASE:  Initiation of Swallow: Delayed (# of seconds)  Laryngeal Elevation: Functional  Aspiration Signs/Symptoms: Strong cough;Delayed cough/throat clear  Vocal Quality: Low volume     Effective Modifications: Alternate liquids/solids;Small sips and bites     Pharyngeal Phase Characteristics: Painful swallow (appears behavioral)     OTHER OBSERVATIONS:  Rate/bite size: Impaired   Endurance:  Impaired      Comments: Tool Used: Dysphagia Outcome and Severity Scale (ANA)     Score Comments   Normal Diet  [ ] 7 With no strategies or extra time needed   Functional Swallow  [ ] 6 May have mild oral or pharyngeal delay         Mild Dysphagia     [ ] 5 Which may require one diet consistency restricted (those who demonstrate penetration which is entirely cleared on MBS would be included)   Mild-Moderate Dysphagia  [ ] 4 With 1-2 diet consistencies restricted         Moderate Dysphagia  [X] 3 With 2 or more diet consistencies restricted         Moderately Severe Dysphagia  [ ] 2 With partial PO strategies (trials with ST only)         Severe Dysphagia  [ ] 1 With inability to tolerate any PO safely            Score:  Initial: 3 Most Recent: X (Date: -- )   Interpretation of Tool: The Dysphagia Outcome and Severity Scale (ANA) is a simple, easy-to-use, 7-point scale developed to systematically rate the functional severity of dysphagia based on objective assessment and make recommendations for diet level, independence level, and type of nutrition.        Score 7 6 5 4 3 2 1   Modifier CH CI CJ CK CL CM CN   · Swallowing:               - CURRENT STATUS:           CL - 60%-79% impaired, limited or restricted               - GOAL STATUS:                   CJ - 20%-39% impaired, limited or restricted               - D/C STATUS:                       ---------------To be determined---------------  [place] / [place] / Product Type: Medicare /       TREATMENT:               (In addition to Assessment/Re-Assessment sessions the following treatments were rendered)  Assessment/Reassessment only, no treatment provided today  MODALITIES:                                                                     ORAL MOTOR  EXERCISES:                                                                                                                                                                       LARYNGEAL / PHARYNGEAL EXERCISES:                                                                                                                                      __________________________________________________________________________________________________  Safety:   After treatment position/precautions:  · Call light within reach  · RN notified  · Upright in Bed  Treatment Assessment:  Patient required max encouragement to participate in evaluation. Progression/Medical Necessity:   · Skilled intervention continues to be required due to persistent signs and symptoms of aspiration and patient still consuming a modified diet. Compliance with Program/Exercises: Will assess as treatment progresses. Reason for Continuation of Services/Other Comments:  · Patient continues to require skilled intervention due to concern for aspiration and +s/sx at bedside. Recommendations/Intent for next treatment session: \"Treatment next visit will focus on modified barium swallow if patient willing to participate to fully evaluate aspiration risk and pharyngeal dysfunction\". Total Treatment Duration:  Time In: 1046  Time Out: [address] [facility].  MS Briana, CCC-SLP

## 2017-09-30 NOTE — PROGRESS NOTES
Problem: Falls - Risk of  Goal: *Absence of Falls  Document Yovany Fall Risk and appropriate interventions in the flowsheet.    Outcome: Progressing Towards Goal  Fall Risk Interventions:  Mobility Interventions: Bed/chair exit alarm     Mentation Interventions: Door open when patient unattended, Adequate sleep, hydration, pain control     Medication Interventions: Evaluate medications/consider consulting pharmacy     Elimination Interventions: Bed/chair exit alarm     History of Falls Interventions: Bed/chair exit alarm, Room close to nurse's station

## 2017-09-30 NOTE — PROGRESS NOTES
Patient received from ICU to room 353  via bed. Patient  appears fragile and weak, alert & oriented to person and place, respirations easy & regular with O2 @ 3L via nasal cannula sats on 95%, diminished breath sounds. Assessment completed. Skin fragile, RLE with small skin tear, small foam silicone dsg applied. Call light within reach, instructed to call for assistance as needed. Visitor at bedside.

## 2017-09-30 NOTE — PROGRESS NOTES
Oxygen rounds:     09/30/17 0912   Oxygen Therapy   O2 Sat (%) 94 %   Pulse via Oximetry 75 beats per minute   O2 Device Nasal cannula   O2 Flow Rate (L/min) 3 l/min

## 2017-09-30 NOTE — PROGRESS NOTES
Pt taken off BIPAP and placed on NC 3lpm (32%). O2 sat 96%. HR 72. Pt resting quietly. RN aware of changes.

## 2017-10-01 NOTE — PROGRESS NOTES
Pt assessment completed. Pt grand nephew and wife, who the patient lives with, questioned her about Code status and insist pt does want to be resuscitated, despite her telling nurses Humaira Cuevas is ready to crossover. \"

## 2017-10-01 NOTE — PROGRESS NOTES
Every bite of supper given to patient to allow her to taste, she replied \"No more. \" Hourly rounding completed. Cough medicine given earlier appears to have been effective. No s/s of any pain.

## 2017-10-01 NOTE — PROGRESS NOTES
Hospitalist Progress Note        Subjective:   Daily Progress Note: 10/1/2017 12:08 PM    89F with known ischemic cardiomyopathy, severe systolic CHF with EF ~04%, s/p AICD/PM placement, HTN and HLD who lives with grandson and grand daughter-in-law presented to the ED due to increasing confusion and persistent cough. Workup revealed Respiratory failure with hypoxia requiring 24hrs on BiPAP, CT chest showed right sided PNA. Patient was admitted to the ICU.    10/1:  Patient coughing less, she did not voice acute complaints other than her dislike of modified liquid consistency  Family friend at bedside reported that she slept little over night. Review of Systems  -10 systems reviewed and are negative other than stated in subjective data above    Objective:     Visit Vitals    /64 (BP 1 Location: Right arm, BP Patient Position: At rest;Supine)    Pulse 69    Temp 97.5 °F (36.4 °C)    Resp 15    Ht 5' 3\" (1.6 m)    Wt 64 kg (141 lb 3.2 oz)    SpO2 96%    Breastfeeding No    BMI 25.01 kg/m2    O2 Flow Rate (L/min): 3 l/min (weaned to 2 ) O2 Device: Nasal cannula    Temp (24hrs), Av.5 °F (36.4 °C), Min:97.4 °F (36.3 °C), Max:97.7 °F (36.5 °C)          1901 - 10/01 0700  In: 1609.2 [I.V.:1609.2]  Out: 350 [Urine:350]    General appearance: alert, cooperative, no distress, appears stated age  Neck: supple  Lungs: occasional coughing, course rales especially on the right base  Heart: regular rate and rhythm, S1, S2 normal  Abdomen: soft, non-tender.  Bowel sounds normal. No masses,  no organomegaly  Extremities: extremities normal, atraumatic, no cyanosis , 1+ pedal edema  Skin: no rash  Neurologic: Grossly normal, no focal deficits  Psych: AAOx person and place, normal mood and affect,  Limited insight      Data Review    Recent Results (from the past 24 hour(s))   METABOLIC PANEL, BASIC    Collection Time: 10/01/17  6:00 AM   Result Value Ref Range    Sodium 145 136 - 145 mmol/L    Potassium 4.7 3.5 - 5.1 mmol/L    Chloride 109 (H) 98 - 107 mmol/L    CO2 23 21 - 32 mmol/L    Anion gap 13 7 - 16 mmol/L    Glucose 127 (H) 65 - 100 mg/dL    BUN 74 (H) 8 - 23 MG/DL    Creatinine 2.35 (H) 0.6 - 1.0 MG/DL    GFR est AA 25 (L) >60 ml/min/1.73m2    GFR est non-AA 21 (L) >60 ml/min/1.73m2    Calcium 9.0 8.3 - 10.4 MG/DL   CBC WITH AUTOMATED DIFF    Collection Time: 10/01/17  6:00 AM   Result Value Ref Range    WBC 8.2 4.3 - 11.1 K/uL    RBC 3.87 (L) 4.05 - 5.25 M/uL    HGB 12.7 11.7 - 15.4 g/dL    HCT 40.3 35.8 - 46.3 %    .1 (H) 79.6 - 97.8 FL    MCH 32.8 26.1 - 32.9 PG    MCHC 31.5 31.4 - 35.0 g/dL    RDW 17.7 (H) 11.9 - 14.6 %    PLATELET 243 002 - 575 K/uL    MPV 10.7 (L) 10.8 - 14.1 FL    NEUTROPHILS 85 (H) 43 - 78 %    LYMPHOCYTES 7 (L) 13 - 44 %    MONOCYTES 7 4.0 - 12.0 %    EOSINOPHILS 1 0.5 - 7.8 %    BASOPHILS 0 0.0 - 2.0 %    ABS. NEUTROPHILS 6.9 1.7 - 8.2 K/UL    ABS. LYMPHOCYTES 0.6 0.5 - 4.6 K/UL    ABS. MONOCYTES 0.6 0.1 - 1.3 K/UL    ABS. EOSINOPHILS 0.1 0.0 - 0.8 K/UL    ABS. BASOPHILS 0.0 0.0 - 0.2 K/UL    RBC COMMENTS SLIGHT  ANISOCYTOSIS        RBC COMMENTS SLIGHT  POIKILOCYTOSIS        WBC COMMENTS Result Confirmed By Smear      PLATELET COMMENTS ADEQUATE      DF MANUAL           Assessment/Plan:     Principal Problem:    Acute respiratory failure with hypoxemia (HCC) (9/29/2017)    Active Problems:    HTN (hypertension) (9/4/2012)      Acute renal failure superimposed on stage 3 chronic kidney disease (HCC) ()      Left bundle branch block (LBBB) ()      Chronic systolic heart failure ([address] (7/28/2017)      Bilateral pneumonia (9/29/2017)          Plan:   B/l Pneumonia, possible aspiration  Day 2 Ceftriaxone (pt tolerated Ancef in the past) and Metronidazole  Attempt to get sputum of available and culture    cont Duoneb and Anti-tussive  Pt eating poorly with nectar thick liquid. Discussed with Dietician and SLP. Will use/try Ensure- which is nectar thick.    MBS in am     ASHIA on CKD3-   -gently hydrating given Severe systolic CHF with EF ~74% per chart review  - monitor I/O       HTN  - BP adequate for age  - cont to hold lasix,   -On Coreg,      Acute respiratory failure due to PNA  - improving  -- now on supplemental O2 by nasal cannula     Chronic systolic CHF  - stable  - not on ACEI/ARB due to CKD  -on Coreg   - PM/AICD in place  - Deferred Cardio consult since pt seems clinically stable for Cardiac perspective. Discussed this with cardio on call     H/o Afib and blessing in past  - on Amiodarone  - has PM in place      Acute metabolic encephalopathy due to PNA and hypoxia  -resolved- no longer lethargic  - answer questions appropriately      Full code  DVT Prophylaxis: heparin  Pt is high risk due to PNA, respiratory failure and advanced age.         Signed By: Brendan Napier MD     October 1, 2017

## 2017-10-01 NOTE — PROGRESS NOTES
Pt asked for medication for pain. Request something more than Tylenol. MD notified, new order for Tramadol 50 mg received.

## 2017-10-01 NOTE — PROGRESS NOTES
09/30/17 2050   Oxygen Therapy   O2 Sat (%) 96 %   Pulse via Oximetry 69 beats per minute   O2 Device Nasal cannula   O2 Flow Rate (L/min) 3 l/min   Respiratory   Respiratory Pattern Regular   Breath Sounds Bilateral Diminished   Cough Productive   Treatment started on patient while family member was visiting. As soon as they left patient immediately told me to stop the treatment and that \"she wasn't going to do anymore\". Not even half of this medication dose was taken prior to request to stop. I informed her that she also had a Duoneb ordered for 2am and she stated that she didn't want the treatment then either.  Patient is currently on 3L NC.

## 2017-10-01 NOTE — PROGRESS NOTES
Problem: Nutrition Deficit  Goal: *Optimize nutritional status  Outcome: Resolved/Met Date Met:  10/01/17  Nutrition  Reason for assessment: Request by Dr. Tammy Serna r/t patient not accepting of honey thickened liquids. Assessment:   Diet order(s): Cardiac, mechanical soft, no mixed consistencies with honey thickened liquids  Food/Nutrition Patient :  Pt  presented with confusion and coughing. Past medical history notable for ischemic cardiomyopathy, CHF, HTN and HLD. Pt lives with grandson and grand daughter in law. Noted from EMR that patient had poor po intake prior to admission. Pt lethargic this am; per friend in room, patient has had minimal po intake since admission; dislikes honey thickened liquids and will not drink them. Pt scheduled for MBS in AM.   Anthropometrics:Height: 5' 3\" (160 cm),  Weight: 64 kg (141 lb 3.2 oz), Weight Source: Bed, Body mass index is 25.01 kg/(m^2). BMI class of normal range for Older American Women. Macronutrient needs:  EER:  9207-6642 kcal /day (25-30 kcal/kg BW)  EPR:  42-52 grams protein/day (0.8 - 1grams/kg IBW) Mild CKD-GFR 21  Intake/Comparative Standards: No recorded intake; per family friend, patient ate bites of cereal and 1/2 banana this am.  Based on verbalized intake, patient potentially meets ~ 15-20% estimated kcal and protein needs. Nutrition Diagnosis: Inadequate oral intake r/t decreased ability to consume sufficient oral intake as evidenced by estimates of insufficient intake of energy or high quality protein from diet when compared to requirements. Intervention:  1. Meals and snacks: Continue current diet. Noted food preferences and assisted with lunch and supper menu selections. Follow up on results of MBS; recommendations for diet. Will not send honey thickened beverages on tray r/t patient will not accept.   2.  Nutrition Supplement Therapy: Initiated chilled Ensure Enlive on all meal trays-no thickener added to Ensure Enlive (nectar consistency); per Dr. Erik Briones, [facility] to not thicken nutrition supplement. Initiated Magic cup once daily. 3.  Coordination of Nutrition Care:  Interdisciplinary Rounds. 4.  Nutrition Discharge Plans: To soon to be determined.      Isela Marks, [address] [place] [address], [address] [place] [address], MPH  [phone]

## 2017-10-01 NOTE — PROGRESS NOTES
[facility]  Face to Face Encounter    Patients Name: Christopher Chen    YOB: 1926    Ordering Physician: Tulio Cruz    Primary Diagnosis: Acute respiratory failure with hypoxemia [facility])    Date of Face to Face:   10/1/2017                                  Face to Face Encounter findings are related to primary reason for home care:   yes. 1. I certify that the patient needs intermittent care as follows: skilled nursing care:  skilled observation/assessment, patient education    2. I certify that this patient is homebound, that is: 1) patient requires the use of a walker device, special transportation, or assistance of another to leave the home; or 2) patient's condition makes leaving the home medically contraindicated; and 3) patient has a normal inability to leave the home and leaving the home requires considerable and taxing effort. Patient may leave the home for infrequent and short duration for medical reasons, and occasional absences for non-medical reasons. Homebound status is due to the following functional limitations: Patient with strength deficits limiting the performance of all ADL's without caregiver assistance or the use of an assistive device. 3. I certify that this patient is under my care and that I, or a nurse practitioner or  [id number], or clinical nurse specialist, or certified nurse midwife, working with me, had a Face-to-Face Encounter that meets the physician Face-to-Face Encounter requirements. The following are the clinical findings from the [address] [place] [address] encounter that support the need for skilled services and is a summary of the encounter: See hospital chart. See attached progess note      Kathy Goode, LMSW  10/1/2017      THE FOLLOWING TO BE COMPLETED BY THE COMMUNITY PHYSICIAN:    I concur with the findings described above from the [facility] encounter that this patient is homebound and in need of a skilled service.     Certifying Physician: _____________________________________      Printed Certifying Physician Name: _____________________________________    Date: _________________

## 2017-10-01 NOTE — PROGRESS NOTES
Shift assessment complete. Pt alert and oriented x4, respirations diminished with productive cough, even and unlabored, HOB elevated, S1&S2 auscultated, HR regular, abd soft, non- tender, Active BS in all 4 quadrants, dressing to sacrum clean, dry, intact, pt with incontinent brief and pure wick in place per pt/family request, IVF infusing without difficulty, pt denies any pain at this time, pt instructed to call for assistance, pt verbalizes understanding, bed low and locked, side rails x3, call light within reach, visitor at bedside.

## 2017-10-02 NOTE — PROGRESS NOTES
Pt not happy with diet ordered (cardiac) and requested regular. Called MD and received order to change diet to regular mechanical soft with honey thickened liquids. Informed family at bedside how to order.

## 2017-10-02 NOTE — PROGRESS NOTES
Problem: Dysphagia (Adult)  Goal: *Acute Goals and Plan of Care (Insert Text)  ST. Pt. Will tolerate mechanical soft/no mixed consistencies, honey thick liquids with no overt s/sx of aspiration/penetration. 2. Pt. Will participate in modified barium swallow with 100% participation to fully evaluate swallow function. LTG: Pt. Will tolerate least restrictive diet without respiratory decline. SPEECH LANGUAGE PATHOLOGY: BEDSIDE SWALLOW NOTE: Daily Note     NAME/AGE/GENDER: Amee Solis is a 80 y.o. female  DATE: 10/2/2017  PRIMARY DIAGNOSIS: Acute respiratory failure with hypoxemia ([address]       ICD-10: Treatment Diagnosis: R13.12 Dysphagia, Oropharyngeal Phase  INTERDISCIPLINARY COLLABORATION: Registered Nurse  PRECAUTIONS/ALLERGIES: Pcn [penicillins]   ASSESSMENT:   Based on the objective data described below, Ms. Lennox Back was seen for Dysphagia trials at bedside to re-assess swallow function. Patient does not like her diet at all. She has refused to eat or drink. Currently she is mechanical soft/honey thick liquids. Patient only agreeable to ice chips and thin liquid trials. All trials of ice chips and thin liquids with (+) overt clinical s/sx of aspiration observed evidenced by immediate and delayed coughs. SLP feels that patient is appropriate to move forward with MBSS this afternoon. SLP  ?'s patient's compliance and diet recommendations. MBSS scheduled for 13:30. Continue with current diet until MBSS is completed and further diet recommendations are made. Grand-daughter and RN aware of the above aforementioned and are in agreement. Patient will benefit from skilled intervention to address the below impairments. ?????? ? ? This section established at most recent assessment??????????  PROBLEM LIST (Impairments causing functional limitations):  1.  Oropharyngeal dysphagia with infiltrates on CXR  REHABILITATION POTENTIAL FOR STATED GOALS: FAIR      PLAN OF CARE:   Patient will benefit from skilled intervention to address the following impairments. RECOMMENDATIONS AND PLANNED INTERVENTIONS (Benefits and precautions of therapy have been discussed with the patient.):  · PO:  Mechanical soft  · Liquids:  honey  · no mixed consistencies  MEDICATIONS:  · Crushed in puree  · With Thickened Liquid  COMPENSATORY STRATEGIES/MODIFICATIONS INCLUDING:  · Alternate liquids/solids  · Small sips and bites  OTHER RECOMMENDATIONS (including follow up treatment recommendations): · Family training/education  · Patient education  RECOMMENDED DIET MODIFICATIONS DISCUSSED WITH:  · Nursing  · Patient  FREQUENCY/DURATION: Continue to follow patient 3 times a week as able or until goals met. RECOMMENDED REHABILITATION/EQUIPMENT: (at time of discharge pending progress): Due to the probability of continued deficits (see above) this patient will likely need continued skilled speech therapy after discharge. SUBJECTIVE:   \"I don't like any of that! \"  History of Present Injury/Illness: Ms. Cherise Evans  has a past medical history of AICD (automatic cardioverter/defibrillator) present; Anemia (10/25/2012); Aortic valve regurgitation; Arrhythmia; CAD (coronary artery disease) (9/4/2012); CHF (congestive heart failure) ([address] (9/4/2012); CHF (congestive heart failure) ([address] (9/4/2012); Chronic kidney disease, stage II (mild); Chronic systolic heart failure ([address] (4/16/2013); Diverticulitis; Encounter for long-term (current) use of other medications (11/19/2012); Esophagitis (2010); Falls; Femur fracture, left ([address] (12/02/2016); Fracture of left radius (12/02/2016); Fracture of wrist (2003); HLD (hyperlipidemia) (9/4/2012); HTN (hypertension) (9/4/2012); Insomnia; Ischemic cardiomyopathy, chronic; Left bundle branch block (LBBB); Osteoporosis (9/4/2012); S/P PTCA (percutaneous transluminal coronary angioplasty); Sigmoid stricture ([address] Stiffness in joint; and Varicose vein (2011). .  She also  has a past surgical history that includes orthopaedic; ptca (2012); cyst removal (2009); gyn; implantable cardioverter defibrillator; and hip replacement. Present Symptoms:    Pain Intensity 1: 0  Pain Location 1: Back  Pain Orientation 1: Lower  Pain Intervention(s) 1: Repositioned  Current Medications:   No current facility-administered medications on file prior to encounter. Current Outpatient Prescriptions on File Prior to Encounter   Medication Sig Dispense Refill    furosemide (LASIX) 20 mg tablet Take 1 tablet every other day. 15 Tab 11    amiodarone (CORDARONE) 200 mg tablet Take 0.5 Tabs by mouth every morning. 15 Tab 11    carvedilol (COREG) 3.125 mg tablet Take 1 Tab by mouth daily. 30 Tab 11    clopidogrel (PLAVIX) 75 mg tab Take 1 Tab by mouth daily. 30 Tab 11    cholecalciferol, VITAMIN D3, (VITAMIN D3) 5,000 unit tab tablet Take 1 Tab by mouth daily. 90 Tab 3    ferrous sulfate 325 mg (65 mg iron) tablet Take 1 Tab by mouth Daily (before breakfast). 90 Tab 1    aspirin delayed-release 81 mg tablet Take 1 Tab by mouth daily. 30 Tab 11    nitroglycerin (NITROSTAT) 0.4 mg SL tablet 1 Tab by SubLINGual route every five (5) minutes as needed for Chest Pain.  25 Tab 11     Current Dietary Status:  [facility] soft/thin liquids                 · History of reflux:  NO  Social History/Home Situation:    Home Environment: Private residence  # Steps to Enter: 1  One/Two Story Residence: One story  Living Alone: No  Support Systems: Child(nkechi)  Patient Expects to be Discharged to[de-identified] Private residence  Current DME Used/Available at Home: Raised toilet seat, Shower chair, Transfer bench, Walker, rolling, Wheelchair      OBJECTIVE:   Respiratory Status:  Room air  0 l/min  CXR Results:infiltrates within RML and bilateral LL  MRI/CT Results:none noted this admission  Oral Motor Structure/Speech:  Oral-Motor Structure/Motor Speech  Labial: Decreased rate, Decreased seal  Dentition: Natural  Oral Hygiene: good  Lingual: Decreased strength, Decreased rate     Cognitive and Communication Status:  Neurologic State: Drowsy     Cognition: Follows commands;Decreased attention/concentration              BEDSIDE SWALLOW EVALUATION  Oral Assessment:     P.O. Trials: The patient was given bite/sip amounts of the following:            ORAL PHASE:                    PHARYNGEAL PHASE:                             OTHER OBSERVATIONS:  Rate/bite size: Impaired   Endurance:  Impaired      Comments: Tool Used: Dysphagia Outcome and Severity Scale (ANA)     Score Comments   Normal Diet  [ ] 7 With no strategies or extra time needed   Functional Swallow  [ ] 6 May have mild oral or pharyngeal delay         Mild Dysphagia     [ ] 5 Which may require one diet consistency restricted (those who demonstrate penetration which is entirely cleared on MBS would be included)   Mild-Moderate Dysphagia  [ ] 4 With 1-2 diet consistencies restricted         Moderate Dysphagia  [X] 3 With 2 or more diet consistencies restricted         Moderately Severe Dysphagia  [ ] 2 With partial PO strategies (trials with ST only)         Severe Dysphagia  [ ] 1 With inability to tolerate any PO safely            Score:  Initial: 3 Most Recent: X (Date: -- )   Interpretation of Tool: The Dysphagia Outcome and Severity Scale (ANA) is a simple, easy-to-use, 7-point scale developed to systematically rate the functional severity of dysphagia based on objective assessment and make recommendations for diet level, independence level, and type of nutrition.        Score 7 6 5 4 3 2 1   Modifier CH CI CJ CK CL CM CN   · Swallowing:               - CURRENT STATUS:           CL - 60%-79% impaired, limited or restricted               - GOAL STATUS:                   CJ - 20%-39% impaired, limited or restricted               - D/C STATUS:                       ---------------To be determined---------------  [place] / [place] / Product Type: Medicare / TREATMENT:               (In addition to Assessment/Re-Assessment sessions the following treatments were rendered)  Dysphagia Activities: Activities/Procedures listed utilized to improve progress in swallow strength, swallow timeliness, swallow function, diet tolerance and swallow safety. Required moderate cueing to improve swallow safety, work toward diet advancement and decrease aspiration risk. MODALITIES:                                                                   ORAL MOTOR  EXERCISES:                                                                                                                                                                       LARYNGEAL / PHARYNGEAL EXERCISES:                                                                                                                                      __________________________________________________________________________________________________  Safety:   After treatment position/precautions:  · Call light within reach  · RN notified  · Upright in Bed  Treatment Assessment:  Patient required max encouragement to participate in evaluation. Progression/Medical Necessity:   · Skilled intervention continues to be required due to persistent signs and symptoms of aspiration and patient still consuming a modified diet. Compliance with Program/Exercises: Will assess as treatment progresses. Reason for Continuation of Services/Other Comments:  · Patient continues to require skilled intervention due to concern for aspiration and +s/sx at bedside. Recommendations/Intent for next treatment session: \"Treatment next visit will focus on modified barium swallow if patient willing to participate to fully evaluate aspiration risk and pharyngeal dysfunction\". Total Treatment Duration:  Time In: 0845  Time Out: 0900     Geeta Burris. Margie Bolden

## 2017-10-02 NOTE — PROGRESS NOTES
Hospitalist Progress Note        Subjective:   Daily Progress Note: 10/2/2017 10:11 AM    89F with known ischemic cardiomyopathy, severe systolic CHF with EF ~50%, s/p AICD/PM placement, HTN and HLD who lives with grandson and grand daughter-in-law presented to the ED due to increasing confusion and persistent cough. Workup revealed Respiratory failure with hypoxia requiring 24hrs on BiPAP, CT chest showed right sided PNA. Patient was admitted to the ICU. Clinically improved by hospital day 1, no longer requiring BiPAP and was transfer to medical unit    10/2: Patient seen, granddaughter at bedside. Less productive coughing, no SOB or chest pain. Eating poorly due to dislike of modified diet. Review of Systems  -10 systems reviewed and are negative other than stated in subjective data above    Objective:     Visit Vitals    /67 (BP 1 Location: Right arm, BP Patient Position: At rest)    Pulse 70    Temp 97.4 °F (36.3 °C)    Resp 20    Ht 5' 3\" (1.6 m)    Wt 65.3 kg (144 lb)    SpO2 92%    Breastfeeding No    BMI 25.51 kg/m2    O2 Flow Rate (L/min): 0 l/min O2 Device: Room air    Temp (24hrs), Av.6 °F (36.4 °C), Min:97.4 °F (36.3 °C), Max:97.7 °F (36.5 °C)      10/02 0701 - 10/02 1900  In: 972 [I.V.:972]  Out: -   1901 - 10/02 0700  In: 2046 [P.O.:240; I.V.:1806]  Out: -     General appearance: alert, cooperative, no distress, appears stated age  Neck: supple  Lungs: clear to auscultation bilaterally anteriorly, improving rales  Heart: regular rate and rhythm, S1, S2 normal  Abdomen: soft, non-tender.  Bowel sounds normal. No masses,  no organomegaly  Extremities: ~1+ pedal edema  Neurologic: Grossly normal  Psych: AAOx person and place, normal mood and affect, limited insight        Data Review    Recent Results (from the past 24 hour(s))   METABOLIC PANEL, BASIC    Collection Time: 10/02/17  6:12 AM   Result Value Ref Range    Sodium 142 136 - 145 mmol/L    Potassium 4.4 3.5 - 5.1 mmol/L    Chloride 107 98 - 107 mmol/L    CO2 23 21 - 32 mmol/L    Anion gap 12 7 - 16 mmol/L    Glucose 104 (H) 65 - 100 mg/dL    BUN 77 (H) 8 - 23 MG/DL    Creatinine 2.40 (H) 0.6 - 1.0 MG/DL    GFR est AA 24 (L) >60 ml/min/1.73m2    GFR est non-AA 20 (L) >60 ml/min/1.73m2    Calcium 8.9 8.3 - 10.4 MG/DL   CBC WITH AUTOMATED DIFF    Collection Time: 10/02/17  6:12 AM   Result Value Ref Range    WBC 6.9 4.3 - 11.1 K/uL    RBC 3.93 (L) 4.05 - 5.25 M/uL    HGB 12.7 11.7 - 15.4 g/dL    HCT 40.5 35.8 - 46.3 %    .1 (H) 79.6 - 97.8 FL    MCH 32.3 26.1 - 32.9 PG    MCHC 31.4 31.4 - 35.0 g/dL    RDW 17.8 (H) 11.9 - 14.6 %    PLATELET 945 029 - 809 K/uL    MPV 11.4 10.8 - 14.1 FL    DF AUTOMATED      NEUTROPHILS 82 (H) 43 - 78 %    LYMPHOCYTES 9 (L) 13 - 44 %    MONOCYTES 8 4.0 - 12.0 %    EOSINOPHILS 1 0.5 - 7.8 %    BASOPHILS 0 0.0 - 2.0 %    IMMATURE GRANULOCYTES 0.4 0.0 - 5.0 %    ABS. NEUTROPHILS 5.6 1.7 - 8.2 K/UL    ABS. LYMPHOCYTES 0.6 0.5 - 4.6 K/UL    ABS. MONOCYTES 0.5 0.1 - 1.3 K/UL    ABS. EOSINOPHILS 0.1 0.0 - 0.8 K/UL    ABS. BASOPHILS 0.0 0.0 - 0.2 K/UL    ABS. IMM.  GRANS. 0.0 0.0 - 0.5 K/UL         Assessment/Plan:     Principal Problem:    Acute respiratory failure with hypoxemia (HCC) (9/29/2017)    Active Problems:    HTN (hypertension) (9/4/2012)      Acute renal failure superimposed on stage 3 chronic kidney disease (HCC) ()      Left bundle branch block (LBBB) ()      Chronic systolic heart failure ([facility] (7/28/2017)      Bilateral pneumonia (9/29/2017)          Plan:   B/l Pneumonia, possible aspiration  Day 3 Ceftriaxone (pt tolerated Ancef in the past) and Metronidazole  Attempt to get sputum of available and culture - has not been available, so will defer  cont Duoneb and Anti-tussive  MBS today to assess swallowing and aspiration risks        ASHIA on CKD3- worsened today  -gently hydrating with 1/2 NS at 75 given Severe systolic CHF with EF ~77% per chart review  - monitor I/O  - Nephro consult appreciated/reviewed  - Renal sonogram pending           HTN  - BP adequate for age  - cont to hold lasix,   -On Coreg,       Acute respiratory failure due to PNA  - improving  -- now on supplemental O2 by nasal cannula- RT to titrate and wean as tolerated      Chronic systolic CHF  - stable  - not on ACEI/ARB due to CKD  -on Coreg   - PM/AICD in place  - Deferred Cardio consult since pt seems clinically stable for Cardiac perspective. Discussed this with cardio on call      H/o Afib and blessing in past  - on Amiodarone  - has PM in place       Acute metabolic encephalopathy due to PNA and hypoxia  -resolved- no longer lethargic  - answer questions appropriately        Full code  DVT Prophylaxis: heparin  Pt is high risk due to PNA, respiratory failure, worsening renal function and advanced age.           Signed By: Rocio Almeida MD     October 2, 2017

## 2017-10-02 NOTE — PROGRESS NOTES
Problem: Mobility Impaired (Adult and Pediatric)  Goal: *Acute Goals and Plan of Care (Insert Text)  STG:  (1.)Ms. Piotr Muniz will move from supine to sit and sit to supine with MINIMAL ASSIST within 3 day(s). (2.)Ms. Piotr Muniz will transfer from bed to chair and chair to bed with CONTACT GUARD ASSIST using the least restrictive device within 3 day(s). (3.)Ms. Piotr Muniz will ambulate with CONTACT GUARD ASSIST for 20 feet with the least restrictive device within 3 day(s). LTG:  (1.)Ms. Piotr Muniz will move from supine to sit and sit to supine in bed with STAND BY ASSIST within 7 day(s). (2.)Ms. Piotr Muniz will transfer from bed to chair and chair to bed with STAND BY ASSIST using the least restrictive device within 7 day(s). (3.)Ms. Piotr Muniz will ambulate with STAND BY ASSIST for 75 feet with the least restrictive device within 7 day(s). (4)Ms. Piotr Muniz will go up and down 4 steps with rail in 7 days. (5)Ms. Piotr uMniz will perform HEP with cues and assistance in 7 days. ________________________________________________________________________________________________       PHYSICAL THERAPY: INITIAL ASSESSMENT, TREATMENT DAY: DAY OF ASSESSMENT, PM 10/2/2017  INPATIENT: Hospital Day: 4  [place] / [place] / Product Type: Medicare /      NAME/AGE/GENDER: Tonia Lang is a 80 y.o. female           PRIMARY DIAGNOSIS: Acute respiratory failure with hypoxemia ([address] Acute respiratory failure with hypoxemia ([address] Acute respiratory failure with hypoxemia (HCC)        ICD-10: Treatment Diagnosis:       · Generalized Muscle Weakness (M62.81)  · Other abnormalities of gait and mobility (R26.89)   Precaution/Allergies:  Pcn [penicillins]       ASSESSMENT:      Ms. Piotr Muniz presents with general debility with decreased in functional mobility and gait. She wanted to only get into the chair so she only walked a few feet with walker and assistance. She is unsteady on her feet and not safe without assistance. Will follow. She states she wants to go home with grand daughter. This section established at most recent assessment   PROBLEM LIST (Impairments causing functional limitations):  1. Decreased Strength  2. Decreased ADL/Functional Activities  3. Decreased Transfer Abilities  4. Decreased Ambulation Ability/Technique  5. Decreased Balance  6. Decreased Activity Tolerance  7. Decreased [place] with Home Exercise Program    INTERVENTIONS PLANNED: (Benefits and precautions of physical therapy have been discussed with the patient.)  1. Balance Exercise  2. Bed Mobility  3. Family Education  4. Gait Training  5. Home Exercise Program (HEP)  6. Range of Motion (ROM)  7. Therapeutic Activites  8. Therapeutic Exercise/Strengthening  9. Transfer Training      TREATMENT PLAN: Frequency/Duration: daily for duration of hospital stay  Rehabilitation Potential For Stated Goals: Alberto Yañez REHABILITATION/EQUIPMENT: (at time of discharge pending progress): Due to the probability of continued deficits (see above) this patient will likely need continued skilled physical therapy after discharge. Equipment:   · None at this time                   HISTORY:   History of Present Injury/Illness (Reason for Referral):  89F with known ischemic cardiomyopathy, severe systolic CHF with EF ~31%, s/p AICD/PM placement, HTN and HLD who lives with grandson and grand daughter-in-law presented to the ED due to increasing confusion and persistent cough  The confusion was described as visual and auditory hallucinations. Patient also reportedly had been coughing up yellowish sputum, was seen by PCP 1 week ago, treated with a course of Doxycycline, no significant improvement reported. No fever or chills noted, No SOB was noted as well  Pt's lasix had been increased from 3x/week to every other day after patient gained 2lbs over her baseline weight of ~125KG? Arvind [place] She also had been eating poorly for the last 2 days.  Patient has poor fluid intake at baseline. Other complaints noted by daughter in law is very poor sleep. Review of papers with vital from home shows baseline O2 on RA 93%. In the ER patient was hypoxic and was placed on NIPPV with improvement in Oxygenation  Past Medical History/Comorbidities:   Ms. Cristy Del Cid  has a past medical history of AICD (automatic cardioverter/defibrillator) present; Anemia (10/25/2012); Aortic valve regurgitation; Arrhythmia; CAD (coronary artery disease) (9/4/2012); CHF (congestive heart failure) ([address] (9/4/2012); CHF (congestive heart failure) ([address] (9/4/2012); Chronic kidney disease, stage II (mild); Chronic systolic heart failure ([address] (4/16/2013); Diverticulitis; Encounter for long-term (current) use of other medications (11/19/2012); Esophagitis (2010); Falls; Femur fracture, left ([address] (12/02/2016); Fracture of left radius (12/02/2016); Fracture of wrist (2003); HLD (hyperlipidemia) (9/4/2012); HTN (hypertension) (9/4/2012); Insomnia; Ischemic cardiomyopathy, chronic; Left bundle branch block (LBBB); Osteoporosis (9/4/2012); S/P PTCA (percutaneous transluminal coronary angioplasty); Sigmoid stricture ([address] Stiffness in joint; and Varicose vein (2011). Ms. Cristy Del Cid  has a past surgical history that includes orthopaedic; ptca (2012); cyst removal (2009); gyn; implantable cardioverter defibrillator; and hip replacement.   Social History/Living Environment:   Home Environment: Private residence  # Steps to Enter: 1  One/Two Story Residence: One story  Living Alone: No  Support Systems: Child(nkechi)  Patient Expects to be Discharged to[de-identified] Private residence  Current DME Used/Available at Home: Raised toilet seat, Shower chair, Transfer bench, Walker, rolling, Wheelchair  Tub or Shower Type: Shower  Prior Level of Function/Work/Activity:  She reports independence with gait and adls   Number of Personal Factors/Comorbidities that affect the Plan of Care: 3+: HIGH COMPLEXITY   EXAMINATION:   Most Recent Physical Functioning:   Gross Assessment:  AROM: Generally decreased, functional (UE and LE for age)  Strength: Generally decreased, functional (UE and LE for age)               Posture:  Posture (WDL): Exceptions to WDL  Posture Assessment: Kyphosis, Forward head, Rounded shoulders, Trunk flexion  Balance:  Sitting: Intact; High guard  Standing: With support;Pull to stand Bed Mobility:  Supine to Sit:  (already sitting on side of bed)  Wheelchair Mobility:     Transfers:  Sit to Stand: Minimum assistance; Additional time  Stand to Sit: Minimum assistance; Additional time  Bed to Chair: Minimum assistance; Additional time  Gait:     Speed/Brandy: Delayed  Step Length: Left shortened;Right shortened  Gait Abnormalities: Decreased step clearance;Shuffling gait  Distance (ft): 4 Feet (ft)  Assistive Device: Walker, rolling  Ambulation - Level of Assistance: Minimal assistance  Interventions: Manual cues; Safety awareness training; Tactile cues; Verbal cues       Body Structures Involved:  1. Bones  2. Joints  3. Muscles  4. Ligaments Body Functions Affected:  1. Movement Related Activities and Participation Affected:  1. Mobility   Number of elements that affect the Plan of Care: 3: MODERATE COMPLEXITY   CLINICAL PRESENTATION:   Presentation: Stable and uncomplicated: LOW COMPLEXITY   CLINICAL DECISION MAKIN [place] [id number] AM-PAC 6 Clicks   Basic Mobility Inpatient Short Form  How much difficulty does the patient currently have. .. Unable A Lot A Little None   1. Turning over in bed (including adjusting bedclothes, sheets and blankets)? [ ] 1   [ ] 2   [X] 3   [ ] 4   2. Sitting down on and standing up from a chair with arms ( e.g., wheelchair, bedside commode, etc.)   [ ] 1   [ ] 2   [X] 3   [ ] 4   3. Moving from lying on back to sitting on the side of the bed? [ ] 1   [ ] 2   [X] 3   [ ] 4   How much help from another person does the patient currently need. .. Total A Lot A Little None   4.   Moving to and from a bed to a chair (including a wheelchair)? [ ] 1   [ ] 2   [X] 3   [ ] 4   5. Need to walk in hospital room? [ ] 1   [X] 2   [ ] 3   [ ] 4   6. Climbing 3-5 steps with a railing? [ ] 1   [X] 2   [ ] 3   [ ] 4   © 2007, Trustees of [address] [place] [address] [address] [id number], under license to [employer]. All rights reserved    Score:  Initial: 16 Most Recent: X (Date: -- )     Interpretation of Tool:  Represents activities that are increasingly more difficult (i.e. Bed mobility, Transfers, Gait). Score 24 23 22-20 19-15 14-10 9-7 6       Modifier CH CI CJ CK CL CM CN         · Mobility - Walking and Moving Around:               - CURRENT STATUS:    CK - 40%-59% impaired, limited or restricted               - GOAL STATUS:           CJ - 20%-39% impaired, limited or restricted               - D/C STATUS:                       ---------------To be determined---------------  [place] / [place] / Product Type: Medicare /       Medical Necessity:     · Patient is expected to demonstrate progress in strength, range of motion and balance to decrease assistance required with theraputic exercises and functional mobility. Reason for Services/Other Comments:  · Patient continues to require present interventions due to patient's inability to perform theraputic exercises and functional mobility independently. Use of outcome tool(s) and clinical judgement create a POC that gives a: Questionable prediction of patient's progress: MODERATE COMPLEXITY                 TREATMENT:   (In addition to Assessment/Re-Assessment sessions the following treatments were rendered)   Pre-treatment Symptoms/Complaints:  Mild SOB, no complaints  Pain: Initial:      Post Session:  0      Assessment/Reassessment only, no treatment provided today     Braces/Orthotics/Lines/Etc:   · IV  · O2 Device: Room air  Treatment/Session Assessment:    · Response to Treatment:  Pt.  With mild SOB getting to chair, unsteady on her feet  · Interdisciplinary Collaboration:  · Registered Nurse  · After treatment position/precautions:  · Up in chair  · Bed alarm/tab alert on  · Bed/Chair-wheels locked  · Bed in low position  · Call light within reach  · RN notified  · Compliance with Program/Exercises: Will assess as treatment progresses. No questions. · Recommendations/Intent for next treatment session: \"Next visit will focus on advancements to more challenging activities and reduction in assistance provided\".   Total Treatment Duration:  PT Patient Time In/Time Out  Time In: 1440  Time Out: [address], PT

## 2017-10-02 NOTE — PROGRESS NOTES
Problem: Interdisciplinary Rounds  Goal: Interdisciplinary Rounds  Interdisciplinary team rounds were held 10/2/2017 with the following team members:Care Management, Nursing, Nutrition, Pharmacy and Physician and the patient and child(nkechi). Plan of care discussed. See clinical pathway and/or care plan for interventions and desired outcomes.

## 2017-10-02 NOTE — PROGRESS NOTES
Shift assessment complete. Pt alert and oriented x2, respirations diminished, even and unlabored, HOB elevated, pt denies any SOB at this time, S1&S2 auscultated, HR regular, abd soft, non- tender, Active BS in all 4 quadrants, No pressure ulcers noted, dressing to sacrum clean, dry, intact, trace of edema noted in BLE, voiding, IVF infusing without difficulty, pt denies any pain at this time, pt instructed to call for assistance, pt verbalizes understanding, bed low and locked, side rails x3, call light within reach.

## 2017-10-02 NOTE — PROGRESS NOTES
1. Pt. Will tolerate mechanical soft/no mixed consistencies, honey thick liquids with no overt s/sx of aspiration/penetration.- MET 10/2/17  2. Pt. Will participate in modified barium swallow with 100% participation to fully evaluate swallow function.- MET 10/2/17  3. Pt. Will tolerate mechanical soft diet and thin liquids (no straws, no mixed consistencies) without overt signs or symptoms of airway compromise. LTG: Pt. Will tolerate least restrictive diet without respiratory decline. Speech language pathology: modified barium swallow study: Initial Assessment and Discharge    NAME/AGE/GENDER: Tammy Melendez is a 80 y.o. female  DATE: 10/2/2017  PRIMARY DIAGNOSIS: Acute respiratory failure with hypoxemia ([facility] [address]       ICD-10: Treatment Diagnosis: R13.12 Oropharyngeal Dysphagia. INTERDISCIPLINARY COLLABORATION: Registered Nurse  PRECAUTIONS/ALLERGIES: Pcn [penicillins] ASSESSMENT/PLAN OF CARE:Based on the objective data described below, Ms. Alisha Burns presents with mild oropharyngeal dysphagia. Timely swallow initiation, triggered at base of tongue. Patient independently performing modified chin tuck (looking down towards her lap) with each swallow. She exhibited flash penetration on first sip thin liquids by cup and trace penetration, well above the level of the vocal cords, with thin by straw. No penetration or aspiration observed on subsequent cup sips of thin. Increased mastication time and delayed swallow initiation of solid consistency. No pharyngeal residue noted. No impairment with puree or mechanical soft textures. Cough was noted x2 during study following sips of thin liquid; however, no penetration or airway compromise was noted on either trial. Appears to be cough independent of po intake. Recommend MECHANICAL soft diet and THIN liquids. No straws. No mixed consistencies. Results and recommendations reviewed with patient's granddaugther at end of session.  No further ST services are warranted at this time. ?????? ? ? This section established at most recent assessment??????????  RECOMMENDATIONS AND PLANNED INTERVENTIONS (Benefits and precautions of therapy have been discussed with the patient.):  · PO:  Mechanical soft with chopped meat and vegetables  · Liquids:  regular thin  MEDICATIONS:  · With Thickened Liquid  COMPENSATORY STRATEGIES/MODIFICATIONS INCLUDING:  · No straws  · No mixed consistencies  OTHER RECOMMENDATIONS (including follow up treatment recommendations):   · None  FREQUENCY/DURATION: No additional ST services are recommended at this time. RECOMMENDED REHABILITATION/EQUIPMENT: (at time of discharge pending progress): Due to the probability of continued deficits (see above) this patient will not likely need continued skilled speech therapy after discharge. SUBJECTIVE:   \"Why are you torturing me like this? \"  History of Present Injury/Illness: Ms. Mikal Kim  has a past medical history of AICD (automatic cardioverter/defibrillator) present; Anemia (10/25/2012); Aortic valve regurgitation; Arrhythmia; CAD (coronary artery disease) (9/4/2012); CHF (congestive heart failure) ([address] (9/4/2012); CHF (congestive heart failure) ([address] (9/4/2012); Chronic kidney disease, stage II (mild); Chronic systolic heart failure ([address] (4/16/2013); Diverticulitis; Encounter for long-term (current) use of other medications (11/19/2012); Esophagitis (2010); Falls; Femur fracture, left ([address] (12/02/2016); Fracture of left radius (12/02/2016); Fracture of wrist (2003); HLD (hyperlipidemia) (9/4/2012); HTN (hypertension) (9/4/2012); Insomnia; Ischemic cardiomyopathy, chronic; Left bundle branch block (LBBB); Osteoporosis (9/4/2012); S/P PTCA (percutaneous transluminal coronary angioplasty); Sigmoid stricture ([address] Stiffness in joint; and Varicose vein (2011). .  She also  has a past surgical history that includes orthopaedic; ptca (2012); cyst removal (2009); gyn; implantable cardioverter defibrillator; and hip replacement. Present Symptoms: Coughing with thin liquids  Pain Intensity 1: 0  Pain Location 1: Back  Pain Orientation 1: Lower  Pain Intervention(s) 1: Repositioned  Current Dietary Status:  Mechanical/honey   Radiologist: Jami  Social History/Home Situation:    Home Environment: Private residence  # Steps to Enter: 1  One/Two Story Residence: One story  Living Alone: No  Support Systems: Child(nkechi)  Patient Expects to be Discharged to[de-identified] Private residence  Current DME Used/Available at Home: Raised toilet seat, Shower chair, Transfer bench, Walker, rolling, Wheelchair  OBJECTIVE:     Cognitive/Communication Status:  Mental Status  Neurologic State: Alert  Orientation Level: Oriented to person, Oriented to place  Cognition: Follows commands  Perception: Appears intact  Perseveration: Perseverates during conversation  Safety/Judgement: Decreased awareness of environment, Decreased awareness of need for assistance, Decreased awareness of need for safety, Decreased insight into deficits    Oral Assessment:  Oral Assessment  Labial: Decreased rate, Decreased seal  Dentition: Natural  Oral Hygiene: adequate  Lingual: Decreased rate    Patient Viewed: Patient Position: Upright in chair  Film Views: Lateral, Fluoro    Oral Prepatory:  The patient was given the following: Consistency Presented:  Thin liquid, Solid, Mechanical soft, Pudding  How Presented: SLP-fed/presented, Cup/sip, Straw    Oral Phase:  Bolus Acceptance: No impairment  Bolus Formation/Control: Impaired  Propulsion: Delayed (# of seconds)  Type of Impairment: Mastication  Oral Residue: None  Initiation of Swallow: Triggered at base of tongue  Oral Phase Severity: Mild    Pharyngeal Phase:  Timing: No impairment  Decreased Tongue Base Retraction?: No  Laryngeal Elevation: Incomplete laryngeal closure  Penetration: Flash/transient, During swallow, From initial swallow  Aspiration/Timing: No evidence of aspiration  Aspiration/Penetration Score: 2 (Penetration/No residue-Contrast enters the airway penetrates, remains above the folds/cords, and is cleared)  Pharyngeal Symmetry: Not assessed  Pharyngeal Dysfunction: None  Pharyngeal Phase Severity: Mild  Pharyngeal-Esophageal Segment: No impairment    Assessment/Reassessment only, no treatment provided today    Tool Used: Dysphagia Outcome and Severity Scale (ANA)    Score Comments   Normal Diet  [] 7 With no strategies or extra time needed       Functional Swallow  [] 6 May have mild oral or pharyngeal delay       Mild Dysphagia    [x] 5 Which may require one diet consistency restricted (those who demonstrate penetration which is entirely cleared on MBS would be included)   Mild-Moderate Dysphagia  [] 4 With 1-2 diet consistencies restricted       Moderate Dysphagia  [] 3 With 2 or more diet consistencies restricted       Moderately Severe Dysphagia  [] 2 With partial PO strategies (trials with ST only)       Severe Dysphagia  [] 1 With inability to tolerate any PO safely          Score:  Initial: 5 Most Recent: X (Date: -- )   Interpretation of Tool: The Dysphagia Outcome and Severity Scale (ANA) is a simple, easy-to-use, 7-point scale developed to systematically rate the functional severity of dysphagia based on objective assessment and make recommendations for diet level, independence level, and type of nutrition. Score 7 6 5 4 3 2 1   Modifier CH CI CJ CK CL CM CN   ? Swallowing:     - CURRENT STATUS: CJ - 20%-39% impaired, limited or restricted    - GOAL STATUS:  CJ - 20%-39% impaired, limited or restricted    - D/C STATUS:  CJ - 20%-39% impaired, limited or restricted  [place] / [place] / Product Type: Medicare /   __________________________________________________________________________________________________  Safety:   After treatment position/precautions:  · Awaiting transport back to room.  Granddaughter present  Recommendations for treatment: Mechanical soft diet/Thin liquids. No straws, no mixed consistencies. No further ST services are warranted at this time.      Total Treatment Duration:  Time In: 1315   Time Out: 1345    MAGALY Shepherd, CCC-SLP, CBIS

## 2017-10-02 NOTE — PROGRESS NOTES
Initial visit by  to convey care and concern and encourage patient that  services are available if desired. No needs were voiced during the visit. Provided business card for future reference.      Ekta Suarez 68  Board Certified

## 2017-10-02 NOTE — PROGRESS NOTES
Pt is coughing, offered her robitussin but she stated she did not want any. She asked for something to drink that was not thickened. I explained to her that she can have an ensure but I could not give her anything that wasn't thickened. Pt became upset with me and asked Alondra Fontana did you put me in a torture chamber? \" I've explained multiple times why she is here and the purpose of the thickened liquids. She is also denying her brief to be changed. Will continue to monitor.

## 2017-10-02 NOTE — PROGRESS NOTES
Problem: Nutrition Deficit  Goal: *Optimize nutritional status  Nutrition Follow Up:  Reason for assessment: Request by Dr. Elvis Cartwright r/t patient not accepting of honey thickened liquids. (10/01)  Assessment:   Diet order(s): Cardiac, mechanical soft, no mixed consistencies with honey thickened liquids  Pt will not drink the honey thickened liquids r/t doesn't like them; has tried the Ensure Enlive-no thickener added and will not drink it per granddaughter in law r/t dislike. Grand-daughter in law not sure if patient tried the magic cup last night. Noted results of bedside swallow evaluation this am;  patient had + overt clinical s/sx of aspiration with trials of ice chips and thin liquids. Pt scheduled for MBSS this afternoon. Anthropometrics:Height: 5' 3\" (160 cm),  Weight: 64 kg (141 lb 3.2 oz), Weight Source: Bed, Body mass index is 25.01 kg/(m^2). BMI class of normal range for Older American Women. Macronutrient needs:  EER:  5380-9007 kcal /day (25-30 kcal/kg BW)  EPR:  42-52 grams protein/day (0.8 - 1grams/kg IBW) Mild CKD-GFR 21  Intake/Comparative Standards:  one recorded intake at 0%; per family friend, patient has had minimal oral intake since admission. Based on verbalized intake, patient potentially meets ~ 15% estimated kcal and protein needs. Intervention:;  1.  Meals and snacks: Continue current diet; no beverages will be sent on the tray r/t non acceptance. Follow up on results of MBSS; recommendations on diet. 2.  Nutrition Supplement Therapy: Discontinue Ensure Enlive due to non acceptance. Initiated Magic cup 2 X daily. 3.  Coordination of Nutrition Care:  Interdisciplinary Rounds. 4.  Nutrition Discharge Plans: To soon to be determined.       Amrik Lee, 66 [address], MPH  [phone]

## 2017-10-02 NOTE — CONSULTS
[facility] Consult    Subjective:     Kaylen Vogel is a 80 y.o.  female who is being seen for ASHIA on CKD. This is a delightful lady with a history of stage 3 CKD baseline creatinine probably in the high 1's who has drifted into the 2's. She is elderly, has a severe underlying cardiomyopathy and was admitted with pneumonia. No nephrotoxins. Does not take NSAIDs. Does develop relatively frequent UTI.s. Her diuretics have been held. She is on gentle IV fluids. Her breathing has been improving on IV antibiotics. Edema is better than baseline.        Past Medical History:   Diagnosis Date    AICD (automatic cardioverter/defibrillator) present     Anemia 10/25/2012    Aortic valve regurgitation     Arrhythmia     blessing, pacemaker, a fib    CAD (coronary artery disease) 9/4/2012    stent    CHF (congestive heart failure) ([address] 9/4/2012    CHF (congestive heart failure) ([address] 9/4/2012    Chronic kidney disease, stage II (mild)     Chronic systolic heart failure ([address] 4/16/2013    Diverticulitis     Encounter for long-term (current) use of other medications 11/19/2012    Esophagitis 2010    EGD    Falls     Femur fracture, left ([address] 12/02/2016    Fracture of left radius 12/02/2016    left radius and ulna fx    Fracture of wrist 2003    left/pins    HLD (hyperlipidemia) 9/4/2012    HTN (hypertension) 9/4/2012    pt denies    Insomnia     Ischemic cardiomyopathy, chronic     Left bundle branch block (LBBB)     Osteoporosis 9/4/2012    S/P PTCA (percutaneous transluminal coronary angioplasty)     Sigmoid stricture (HCC)     Dr. Christy Aguirre    Stiffness in joint     left hip, left wrist    Varicose vein 2011    surg/ Dr. Rocio Ventura      Past Surgical History:   Procedure Laterality Date    HX CYST REMOVAL  2009    right hand    HX GYN      hysterectomy, bladder sling    HX HIP REPLACEMENT      after fracture    HX IMPLANTABLE CARDIOVERTER DEFIBRILLATOR      HX ORTHOPAEDIC      lt hand    HX PTCA  2012     History reviewed. No pertinent family history.    Social History   Substance Use Topics    Smoking status: Never Smoker    Smokeless tobacco: Never Used    Alcohol use Yes      Comment: occasional       Current Facility-Administered Medications   Medication Dose Route Frequency Provider Last Rate Last Dose    0.45% sodium chloride infusion  75 mL/hr IntraVENous CONTINUOUS Whitney Crabtree MD 75 mL/hr at 10/02/17 0546 75 mL/hr at 10/02/17 0546    albuterol-ipratropium (DUO-NEB) 2.5 MG-0.5 MG/3 ML  3 mL Nebulization Q6H RT Whitney Crabtree MD   3 mL at 10/02/17 0100    guaiFENesin-dextromethorphan (ROBITUSSIN DM) 100-10 mg/5 mL syrup 10 mL  10 mL Oral Q6H PRN Whitney Crabtree MD   10 mL at 10/01/17 1616    cefTRIAXone (ROCEPHIN) 2 g in 0.9% sodium chloride (MBP/ADV) 50 mL  2 g IntraVENous Q24H Whitney Crabtree  mL/hr at 10/01/17 1228 2 g at 10/01/17 1228    metroNIDAZOLE (FLAGYL) IVPB premix 500 mg  500 mg IntraVENous Q12H Whitney Crabtree  mL/hr at 10/02/17 0138 500 mg at 10/02/17 0138    amiodarone (CORDARONE) tablet 100 mg  100 mg Oral DAILY Whitney Crabtree MD   100 mg at 10/01/17 1003    carvedilol (COREG) tablet 3.125 mg  3.125 mg Oral DAILY Whitney Crabtree MD   3.125 mg at 10/01/17 1003    clopidogrel (PLAVIX) tablet 75 mg  75 mg Oral DAILY Whitney Crabtree MD   75 mg at 10/01/17 1002    sodium chloride (NS) flush 5-10 mL  5-10 mL IntraVENous Q8H Whitney Crabtree MD   5 mL at 10/02/17 0547    sodium chloride (NS) flush 5-10 mL  5-10 mL IntraVENous PRN Whitney Crabtree MD        acetaminophen (TYLENOL) tablet 650 mg  650 mg Oral Q4H PRN Whitney Crabtree MD        ondansetron (ZOFRAN) injection 4 mg  4 mg IntraVENous Q4H PRN Whitney Crabtree MD   4 mg at 10/01/17 0325    senna-docusate (PERICOLACE) 8.6-50 mg per tablet 1 Tab  1 Tab Oral BID PRN Whitney Crabtree MD        heparin (porcine) injection 5,000 Units  5,000 Units SubCUTAneous Q8H Fareed Barcenas MD   5,000 Units at 10/02/17 0136        Allergies   Allergen Reactions    Pcn [Penicillins] Unknown (comments)     Shauna Charles at [facility] states no allergies listed        Review of Systems:  Pertinent items are noted in the History of Present Illness. Objective: Intake and Output:    10/02 0701 - 10/02 1900  In: 684 [I.V.:972]  Out: -   09/30 1901 - 10/02 0700  In: 2046 [P.O.:240; I.V.:1806]  Out: -     Physical Exam:   General appearance: alert, cooperative, no distress, appears stated age     Neck: supple, symmetrical, trachea midline   Lungs: clear to auscultation bilaterally  Heart: regular rate and rhythm, S1, S2 normal, 2/6 S murmur, no click, rub or gallop  Abdomen: soft, non-tender. Bowel sounds normal. No masses,  no organomegaly  Extremities: extremities tr edema           Data Review:   Recent Results (from the past 24 hour(s))   METABOLIC PANEL, BASIC    Collection Time: 10/02/17  6:12 AM   Result Value Ref Range    Sodium 142 136 - 145 mmol/L    Potassium 4.4 3.5 - 5.1 mmol/L    Chloride 107 98 - 107 mmol/L    CO2 23 21 - 32 mmol/L    Anion gap 12 7 - 16 mmol/L    Glucose 104 (H) 65 - 100 mg/dL    BUN 77 (H) 8 - 23 MG/DL    Creatinine 2.40 (H) 0.6 - 1.0 MG/DL    GFR est AA 24 (L) >60 ml/min/1.73m2    GFR est non-AA 20 (L) >60 ml/min/1.73m2    Calcium 8.9 8.3 - 10.4 MG/DL   CBC WITH AUTOMATED DIFF    Collection Time: 10/02/17  6:12 AM   Result Value Ref Range    WBC 6.9 4.3 - 11.1 K/uL    RBC 3.93 (L) 4.05 - 5.25 M/uL    HGB 12.7 11.7 - 15.4 g/dL    HCT 40.5 35.8 - 46.3 %    .1 (H) 79.6 - 97.8 FL    MCH 32.3 26.1 - 32.9 PG    MCHC 31.4 31.4 - 35.0 g/dL    RDW 17.8 (H) 11.9 - 14.6 %    PLATELET 468 110 - 661 K/uL    MPV 11.4 10.8 - 14.1 FL    DF AUTOMATED      NEUTROPHILS 82 (H) 43 - 78 %    LYMPHOCYTES 9 (L) 13 - 44 %    MONOCYTES 8 4.0 - 12.0 %    EOSINOPHILS 1 0.5 - 7.8 %    BASOPHILS 0 0.0 - 2.0 %    IMMATURE GRANULOCYTES 0.4 0.0 - 5.0 %    ABS.  NEUTROPHILS 5.6 1.7 - 8.2 K/UL ABS. LYMPHOCYTES 0.6 0.5 - 4.6 K/UL    ABS. MONOCYTES 0.5 0.1 - 1.3 K/UL    ABS. EOSINOPHILS 0.1 0.0 - 0.8 K/UL    ABS. BASOPHILS 0.0 0.0 - 0.2 K/UL    ABS. IMM. GRANS. 0.0 0.0 - 0.5 K/UL           Assessment:     Principal Problem:    Acute respiratory failure with hypoxemia ([facility]) (9/29/2017)    Active Problems:    HTN (hypertension) (9/4/2012)      Acute renal failure superimposed on stage 3 chronic kidney disease ([facility]) ()      Left bundle branch block (LBBB) ()      Chronic systolic heart failure ([facility] [address] (7/28/2017)      Bilateral pneumonia (9/29/2017)        Plan: This is a delightful elderly lady with a severe cardiomyopathy and pneumonia with a wet/dry problem. I agree with current managements. Would continue fluids until she is able to eat and drink properly - she has a swallowing study today. Would restart diuretics symptomatically. Currently she appears to be on the dry side of baseline. I don't think the BNP is particularly helpful in this setting. Dialysis would not benefit her clinically and this was discussed with her and her granddaughter/caregiver. Thank you for the kind courtesy of this consultation. Will make further adjustments to the medical regimen as the clinical course dictates and follow very closely with you.       Signed By: Lisa Kirkland MD     October 2, 2017

## 2017-10-02 NOTE — PROGRESS NOTES
Patient's sats on RA were 93%, however when patient would fall asleep, sats did drop to 85%. Patient observed falling asleep at times, but no distress or SOB noted. Patient awake and stable on RA when I left.

## 2017-10-02 NOTE — PROGRESS NOTES
Hourly rounds completed every hour. End of shift report given to [facility] FADI JAMES RN.

## 2017-10-02 NOTE — PROGRESS NOTES
Bedside shift change report given to Danna Dunen (oncoming nurse) by Vina Cockayne (offgoing nurse). Report included the following information SBAR, Kardex and Intake/Output.

## 2017-10-03 NOTE — PROGRESS NOTES
Patient having difficulty clearing her airway. Placed call to respiratory therapy for a prn breathing treatment. Will continue to follow her comfort level with her breathing.

## 2017-10-03 NOTE — PROGRESS NOTES
Progress Note    Patient: Toshia Reynoso MRN: [id number]  SSN: [id number]    YOB: 1926  Age: 80 y.o. Sex: female      Admit Date: 9/29/2017    LOS: 4 days     Subjective: The patient is a 79 y/o lady with Chronic Systolic CHF s/p AICD, HTN, Dyslipidemia, is managed for R middle lobe Pneumonia and ASHIA on CKD stage 4. She feels a little bit better but continues to cough. She also has a UTI and the urine culture grew VRE. An ID consultation will be requested. Objective:     Vitals:    10/03/17 0742 10/03/17 0758 10/03/17 1142 10/03/17 1417   BP:  118/62 95/60 110/66   Pulse:  72 70 70   Resp:  18 20 20   Temp:  96 °F (35.6 °C) 97.6 °F (36.4 °C) 96 °F (35.6 °C)   SpO2: 92% 95% 94% 90%   Weight:       Height:            Intake and Output:  Current Shift:    Last three shifts: 10/01 1901 - 10/03 0700  In: 2370 [I.V.:2370]  Out: 275 [Urine:275]    Physical Exam:   GENERAL: alert, fatigued, cooperative, no distress, appears stated age  LUNG: clear to auscultation bilaterally, mild diminished breath sounds on the right lung base  HEART: regular rate and rhythm, S1, S2 normal, no murmur, click, rub or gallop  ABDOMEN: soft, non-tender. Bowel sounds normal. No masses,  no organomegaly  EXTREMITIES:  3+ pitting edema in the LE bilaterally    Lab/Data Review: All lab results for the last 24 hours reviewed. Hb 13.2, WBC 7.1, Cr 2.28    Assessment:     Principal Problem:    Acute respiratory failure with hypoxemia (HCC) (9/29/2017)    Active Problems:    HTN (hypertension) (9/4/2012)      Acute renal failure superimposed on stage 3 chronic kidney disease (HCC) ()      Left bundle branch block (LBBB) ()      Chronic systolic heart failure ([address] (7/28/2017)      Bilateral pneumonia (9/29/2017)        Plan:     1 - R middle lobe Pneumonia, slowly improving. Continue current antibiotics. 2 - UTI due to VRE. Will probably start Linezolid. ID consultation in the morning.    3 - Chronic Systolic CHF s/p AICD, stable. Lasix was discontinued b/o ASHIA, and now the patient has 3+ pitting edema in the LE. Stop IV fluids. Resume Lasix when clinically appropriate. 4 - ASHIA on CKD stage 4. Cr came down to 2.28. The patient is followed by Nephrology, appreciate their input. Continue to hold Lasix. D/c IV fluids. 5 - HTN, controlled. Continue current medications. 6 - DVT Prophylaxis with Heparin SQ.  7 - Disposition: possible discharge within the next 48 hours pending further clinical improvement.      Signed By: Jean-Paul Sutherland MD     October 3, 2017

## 2017-10-03 NOTE — PROGRESS NOTES
Pt assessment completed. Pt in bed, resting quietly. NAD noted. Call light and essentials within reach. Pt aware to call with needs. Will monitor.

## 2017-10-03 NOTE — PROGRESS NOTES
Shift assessment complete. Patient alert and oriented to self, respirations present, even and unlabored, HOB elevated, patient denies any SOB at this time, S1&S2 auscultated, HR regular, rate and rhythm, abdomen soft, non-tender, bowel sounds active in all 4 quadrants, and no pressure ulcers or edema noted.   Patient is up with 2 person assist to the bedside chair, IVF infusing without difficulty, pt denies any pain at this time, pt instructed to call for assistance, pt verbalizes understanding, bed low and locked, side rails x3, call light within reach.

## 2017-10-03 NOTE — PROGRESS NOTES
Problem: Self Care Deficits Care Plan (Adult)  Goal: *Acute Goals and Plan of Care (Insert Text)  1. Patient will perform grooming with supervision. 2. Patient will perform Upper body dressing with supervision  3. Patient will perform lower body dressing with minimal assist.  4. Patient will perform upper and lower body bathing with minimal assist.  5. Patient will perform toilet transfers with CGA. 6. Patient will perform shower transfer with CGA. 7. Patient will participate in 30 + minutes of ADL/ therapeutic exercise/therapeutic activity with min rest breaks to increase activity tolerance for self care. 8. Patient will perform ADL functional mobility in room with CGA. Goals to be achieved in 7 days. OCCUPATIONAL THERAPY: Initial Assessment 10/3/2017  INPATIENT: Hospital Day: 5  [place] / Product Type: Medicare /      NAME/AGE/GENDER: Alo Cleary is a 80 y.o. female           PRIMARY DIAGNOSIS:  Acute respiratory failure with hypoxemia ([address] Acute respiratory failure with hypoxemia ([address] Acute respiratory failure with hypoxemia ([address]        ICD-10: Treatment Diagnosis:        · Generalized Muscle Weakness (M62.81)  · Other lack of cordination (R27.8)   Precautions/Allergies:         Pcn [penicillins]       ASSESSMENT:      Ms. Amina Perea admitted with above diagnosis. Pt presents with generalized weakness and decreased self care and functional mobility. Pt would benefit from skilled OT to increase independence. This section established at most recent assessment   PROBLEM LIST (Impairments causing functional limitations):  1. Decreased Strength  2. Decreased ADL/Functional Activities  3. Decreased Transfer Abilities  4. Decreased Ambulation Ability/Technique  5. Decreased Balance  6. Decreased Activity Tolerance    INTERVENTIONS PLANNED: (Benefits and precautions of occupational therapy have been discussed with the patient.)  1.  Activities of daily living training  2. Adaptive equipment training  3. Balance training  4. Clothing management  5. Therapeutic activity  6. Therapeutic exercise      TREATMENT PLAN: Frequency/Duration: Follow patient 3x/week to address above goals. Rehabilitation Potential For Stated Goals: GOOD      RECOMMENDED REHABILITATION/EQUIPMENT: (at time of discharge pending progress): Due to the probability of continued deficits (see above) this patient will likely need continued skilled occupational therapy after discharge. Equipment:   · None at this time                      OCCUPATIONAL PROFILE AND HISTORY:   History of Present Injury/Illness (Reason for Referral):  89F with known ischemic cardiomyopathy, severe systolic CHF with EF ~87%, s/p AICD/PM placement, HTN and HLD who lives with grandson and grand daughter-in-law presented to the ED due to increasing confusion and persistent cough  The confusion was described as visual and auditory hallucinations. Patient also reportedly had been coughing up yellowish sputum, was seen by PCP 1 week ago, treated with a course of Doxycycline, no significant improvement reported. No fever or chills noted, No SOB was noted as well  Pt's lasix had been increased from 3x/week to every other day after patient gained 2lbs over her baseline weight of ~125KG? Doug Spies She also had been eating poorly for the last 2 days. Patient has poor fluid intake at baseline. Other complaints noted by daughter in law is very poor sleep. Review of papers with vital from home shows baseline O2 on RA 93%. In the ER patient was hypoxic and was placed on NIPPV with improvement in Oxygenation  Past Medical History/Comorbidities:   Ms. Davion Sepulveda  has a past medical history of AICD (automatic cardioverter/defibrillator) present; Anemia (10/25/2012);  Aortic valve regurgitation; Arrhythmia; CAD (coronary artery disease) (9/4/2012); CHF (congestive heart failure) ([facility] (9/4/2012); CHF (congestive heart failure) ([facility] (9/4/2012); Chronic kidney disease, stage II (mild); Chronic systolic heart failure ([facility] (4/16/2013); Diverticulitis; Encounter for long-term (current) use of other medications (11/19/2012); Esophagitis (2010); Falls; Femur fracture, left ([facility] (12/02/2016); Fracture of left radius (12/02/2016); Fracture of wrist (2003); HLD (hyperlipidemia) (9/4/2012); HTN (hypertension) (9/4/2012); Insomnia; Ischemic cardiomyopathy, chronic; Left bundle branch block (LBBB); Osteoporosis (9/4/2012); S/P PTCA (percutaneous transluminal coronary angioplasty); [facility] Stiffness in joint; and Varicose vein (2011). Ms. Calvin Best  has a past surgical history that includes orthopaedic; ptca (2012); cyst removal (2009); gyn; implantable cardioverter defibrillator; and hip replacement. Social History/Living Environment:   Home Environment: Private residence  # Steps to Enter: 1  One/Two Story Residence: One story  Living Alone: No  Support Systems: Child(nkechi)  Patient Expects to be Discharged to[de-identified] Private residence  Current DME Used/Available at Home: Raised toilet seat, Shower chair, Transfer bench, Walker, rolling, Wheelchair  Tub or Shower Type: Shower  Prior Level of Function/Work/Activity:  Assist with ADLs and ambulation   Number of Personal Factors/Comorbidities that affect the Plan of Care: Expanded review of therapy/medical records (1-2):  MODERATE COMPLEXITY   ASSESSMENT OF OCCUPATIONAL PERFORMANCE[de-identified]   Activities of Daily Living:          Basic ADLs (From Assessment) Complex ADLs (From Assessment)   Basic ADL  Feeding: Setup  Oral Facial Hygiene/Grooming: Minimum assistance  Bathing:  Moderate assistance  Upper Body Dressing: Minimum assistance  Lower Body Dressing: Maximum assistance  Toileting: Contact guard assistance     Grooming/Bathing/Dressing Activities of Daily Living     Cognitive Retraining  Safety/Judgement: Fall prevention                 Functional Transfers  Toilet Transfer : Minimum assistance  Shower Transfer: Minimum assistance     Bed/Mat Mobility  Sit to Stand: Minimum assistance  Bed to Chair: Minimum assistance          Most Recent Physical Functioning:   Gross Assessment:  AROM: Generally decreased, functional (BUE)  Strength: Generally decreased, functional (BUE)  Coordination: Generally decreased, functional (BUE)  Tone: Normal  Sensation: Intact               Posture:  Posture (WDL): Exceptions to WDL  Posture Assessment: Kyphosis, Forward head, Rounded shoulders, Trunk flexion  Balance:  Sitting: Intact  Standing: Pull to stand; With support Bed Mobility:     Wheelchair Mobility:     Transfers:  Sit to Stand: Minimum assistance  Stand to Sit: Minimum assistance  Bed to Chair: Minimum assistance              Patient Vitals for the past 6 hrs:       BP BP Patient Position SpO2 Pulse   10/03/17 0742 - - 92 % -   10/03/17 0758 118/62 At rest;Sitting 95 % 72        Mental Status  Neurologic State: Alert  Orientation Level: Oriented X4  Cognition: Follows commands  Perception: Appears intact  Perseveration: No perseveration noted  Safety/Judgement: Fall prevention                               Physical Skills Involved:  1. Balance  2. Strength  3. Activity Tolerance Cognitive Skills Affected (resulting in the inability to perform in a timely and safe manner): 1. none Psychosocial Skills Affected:  1. none   Number of elements that affect the Plan of Care: 1-3:  LOW COMPLEXITY   CLINICAL DECISION MAKIN [facility] [address] [id number] AM-PAC 6 Clicks   Daily Activity Inpatient Short Form  How much help from another person does the patient currently need. .. Total A Lot A Little None   1. Putting on and taking off regular lower body clothing?   [ ] 1   [X] 2   [ ] 3   [ ] 4   2. Bathing (including washing, rinsing, drying)? [ ] 1   [X] 2   [ ] 3   [ ] 4   3. Toileting, which includes using toilet, bedpan or urinal?   [ ] 1   [X] 2   [ ] 3   [ ] 4   4.   Putting on and taking off regular upper body clothing?   [ ] 1   [ ] 2 [X] 3   [ ] 4   5. Taking care of personal grooming such as brushing teeth? [ ] 1   [ ] 2   [X] 3   [ ] 4   6. Eating meals? [ ] 1   [ ] 2   [X] 3   [ ] 4   © 2007, Trustees of [address] [place] [address] [address] [address], under license to [employer]. All rights reserved    Score:  Initial: 15 Most Recent: X (Date: -- )     Interpretation of Tool:  Represents activities that are increasingly more difficult (i.e. Bed mobility, Transfers, Gait). Score 24 23 22-20 19-15 14-10 9-7 6       Modifier CH CI CJ CK CL CM CN         · Self Care:               - CURRENT STATUS:    CK - 40%-59% impaired, limited or restricted               - GOAL STATUS:           CJ - 20%-39% impaired, limited or restricted               - D/C STATUS:                       ---------------To be determined---------------  [place] / Product Type: Medicare /       Medical Necessity:     · Patient is expected to demonstrate progress in strength, balance, coordination and functional technique to increase independence with self care and functional mobility. Reason for Services/Other Comments:  · Patient continues to require skilled intervention due to decrease self care and functional mobility.    Use of outcome tool(s) and clinical judgement create a POC that gives a: LOW COMPLEXITY             TREATMENT:   (In addition to Assessment/Re-Assessment sessions the following treatments were rendered)      Pre-treatment Symptoms/Complaints:  No complaints  Pain: Initial:   Pain Intensity 1: 0  Post Session:  0      Assessment/Reassessment only, no treatment provided today     Braces/Orthotics/Lines/Etc:   · O2 Device: Room air  Treatment/Session Assessment:    · Response to Treatment:  Tolerated well  · Interdisciplinary Collaboration:  · Registered Nurse  · Rehabilitation Attendant  · After treatment position/precautions:  · Up in chair  · Bed alarm/tab alert on  · Bed/Chair-wheels locked  · Caregiver at bedside  · Call light within reach  · RN notified  · Compliance with Program/Exercises: compliant all of the time. · Recommendations/Intent for next treatment session: \"Next visit will focus on advancements to more challenging activities and reduction in assistance provided\".   Total Treatment Duration:  OT Patient Time In/Time Out  Time In: 0945  Time Out: Gianluca Hernandez

## 2017-10-03 NOTE — PROGRESS NOTES
Problem: Interdisciplinary Rounds  Goal: Interdisciplinary Rounds  Interdisciplinary team rounds were held 10/3/2017 with the following team members:Care Management, Nursing, Nutrition, Pharmacy and Physician. Plan of care discussed. See clinical pathway and/or care plan for interventions and desired outcomes.

## 2017-10-03 NOTE — PROGRESS NOTES
Problem: Mobility Impaired (Adult and Pediatric)  Goal: *Acute Goals and Plan of Care (Insert Text)  STG:  (1.)Ms. Mikal Kim will move from supine to sit and sit to supine with MINIMAL ASSIST within 3 day(s). (2.)Ms. Mikal Kim will transfer from bed to chair and chair to bed with CONTACT GUARD ASSIST using the least restrictive device within 3 day(s). (3.)Ms. Mikal Kim will ambulate with CONTACT GUARD ASSIST for 20 feet with the least restrictive device within 3 day(s). LTG:  (1.)Ms. Mikal Kim will move from supine to sit and sit to supine in bed with STAND BY ASSIST within 7 day(s). (2.)Ms. Mikal Kim will transfer from bed to chair and chair to bed with STAND BY ASSIST using the least restrictive device within 7 day(s). (3.)Ms. Mikal Kim will ambulate with STAND BY ASSIST for 75 feet with the least restrictive device within 7 day(s). (4)Ms. Mikal Kim will go up and down 4 steps with rail in 7 days. (5)Ms. Mikal Kim will perform HEP with cues and assistance in 7 days. ________________________________________________________________________________________________       PHYSICAL THERAPY: Daily Note, Treatment Day: 1st and PM 10/3/2017  INPATIENT: Hospital Day: 5  [place] / Product Type: Medicare /      NAME/AGE/GENDER: Carol Cornell is a 80 y.o. female           PRIMARY DIAGNOSIS: Acute respiratory failure with hypoxemia ([address] Acute respiratory failure with hypoxemia (HCC) Acute respiratory failure with hypoxemia (HCC)        ICD-10: Treatment Diagnosis:       · Generalized Muscle Weakness (M62.81)  · Other abnormalities of gait and mobility (R26.89)   Precaution/Allergies:  Pcn [penicillins]       ASSESSMENT:      Ms. Mikal Kim presents with general debility with decreased in functional mobility and gait. Pt. Seen this pm.  She complains of sore throat and coughing. She agreed to get up.  She only walked a few feet with walker and assistance because she said she was too fatigued to walk anymore. She is not safe on her feet without assistance. Pt. Left in chair with chair alarm on. This section established at most recent assessment   PROBLEM LIST (Impairments causing functional limitations):  1. Decreased Strength  2. Decreased ADL/Functional Activities  3. Decreased Transfer Abilities  4. Decreased Ambulation Ability/Technique  5. Decreased Balance  6. Decreased Activity Tolerance  7. Decreased [place] with Home Exercise Program    INTERVENTIONS PLANNED: (Benefits and precautions of physical therapy have been discussed with the patient.)  1. Balance Exercise  2. Bed Mobility  3. Family Education  4. Gait Training  5. Home Exercise Program (HEP)  6. Range of Motion (ROM)  7. Therapeutic Activites  8. Therapeutic Exercise/Strengthening  9. Transfer Training      TREATMENT PLAN: Frequency/Duration: daily for duration of hospital stay  Rehabilitation Potential For Stated Goals: [facility] REHABILITATION/EQUIPMENT: (at time of discharge pending progress): Due to the probability of continued deficits (see above) this patient will likely need continued skilled physical therapy after discharge. Equipment:   · None at this time                   HISTORY:   History of Present Injury/Illness (Reason for Referral):  89F with known ischemic cardiomyopathy, severe systolic CHF with EF ~22%, s/p AICD/PM placement, HTN and HLD who lives with grandson and grand daughter-in-law presented to the ED due to increasing confusion and persistent cough  The confusion was described as visual and auditory hallucinations. Patient also reportedly had been coughing up yellowish sputum, was seen by PCP 1 week ago, treated with a course of Doxycycline, no significant improvement reported. No fever or chills noted, No SOB was noted as well  Pt's lasix had been increased from 3x/week to every other day after patient gained 2lbs over her baseline weight of ~125KG? Gregor Swain  She also had been eating poorly for the last 2 days. Patient has poor fluid intake at baseline. Other complaints noted by daughter in law is very poor sleep. Review of papers with vital from home shows baseline O2 on RA 93%. In the ER patient was hypoxic and was placed on NIPPV with improvement in Oxygenation  Past Medical History/Comorbidities:   Ms. Benja Kinsey  has a past medical history of AICD (automatic cardioverter/defibrillator) present; Anemia (10/25/2012); Aortic valve regurgitation; Arrhythmia; CAD (coronary artery disease) (9/4/2012); CHF (congestive heart failure) ([facility] (9/4/2012); CHF (congestive heart failure) ([facility] (9/4/2012); Chronic kidney disease, stage II (mild); Chronic systolic heart failure ([facility] (4/16/2013); Diverticulitis; Encounter for long-term (current) use of other medications (11/19/2012); Esophagitis (2010); Falls; Femur fracture, left ([facility] (12/02/2016); Fracture of left radius (12/02/2016); Fracture of wrist (2003); HLD (hyperlipidemia) (9/4/2012); HTN (hypertension) (9/4/2012); Insomnia; Ischemic cardiomyopathy, chronic; Left bundle branch block (LBBB); Osteoporosis (9/4/2012); S/P PTCA (percutaneous transluminal coronary angioplasty); Sigmoid stricture ([address] Stiffness in joint; and Varicose vein (2011). Ms. Benja Kinsey  has a past surgical history that includes orthopaedic; ptca (2012); cyst removal (2009); gyn; implantable cardioverter defibrillator; and hip replacement.   Social History/Living Environment:   Home Environment: Private residence  # Steps to Enter: 1  One/Two Story Residence: One story  Living Alone: No  Support Systems: Child(nkechi)  Patient Expects to be Discharged to[de-identified] Private residence  Current DME Used/Available at Home: Raised toilet seat, Shower chair, Transfer bench, Walker, rolling, Wheelchair  Tub or Shower Type: Shower  Prior Level of Function/Work/Activity:  She reports independence with gait and adls   Number of Personal Factors/Comorbidities that affect the Plan of Care: 3+: HIGH COMPLEXITY EXAMINATION:   Most Recent Physical Functioning:   Gross Assessment:  AROM: Generally decreased, functional (UE and LE for age)  Strength: Generally decreased, functional (UE and LE for age)               Posture:  Posture (WDL): Exceptions to WDL  Posture Assessment: Kyphosis, Forward head, Rounded shoulders, Trunk flexion  Balance:  Sitting: High guard  Standing: Pull to stand; With support Bed Mobility:  Supine to Sit: Minimum assistance  Wheelchair Mobility:     Transfers:  Sit to Stand: Minimum assistance  Stand to Sit: Minimum assistance  Bed to Chair: Minimum assistance  Duration: 20 Minutes  Gait:     Speed/Brandy: Delayed  Step Length: Left shortened;Right shortened  Gait Abnormalities: Decreased step clearance;Shuffling gait  Distance (ft): 6 Feet (ft)  Assistive Device: Walker, rolling  Ambulation - Level of Assistance: Minimal assistance  Interventions: Safety awareness training;Verbal cues       Body Structures Involved:  1. Bones  2. Joints  3. Muscles  4. Ligaments Body Functions Affected:  1. Movement Related Activities and Participation Affected:  1. Mobility   Number of elements that affect the Plan of Care: 3: MODERATE COMPLEXITY   CLINICAL PRESENTATION:   Presentation: Stable and uncomplicated: LOW COMPLEXITY   CLINICAL DECISION MAKIN[address] [place], [place] [address] 6 Clicks   Basic Mobility Inpatient Short Form  How much difficulty does the patient currently have. .. Unable A Lot A Little None   1. Turning over in bed (including adjusting bedclothes, sheets and blankets)? [ ] 1   [ ] 2   [X] 3   [ ] 4   2. Sitting down on and standing up from a chair with arms ( e.g., wheelchair, bedside commode, etc.)   [ ] 1   [ ] 2   [X] 3   [ ] 4   3. Moving from lying on back to sitting on the side of the bed? [ ] 1   [ ] 2   [X] 3   [ ] 4   How much help from another person does the patient currently need. .. Total A Lot A Little None   4.   Moving to and from a bed to a chair (including a wheelchair)? [ ] 1   [ ] 2   [X] 3   [ ] 4   5. Need to walk in hospital room? [ ] 1   [X] 2   [ ] 3   [ ] 4   6. Climbing 3-5 steps with a railing? [ ] 1   [X] 2   [ ] 3   [ ] 4   © 2007, Trustees of [address] [place] [address] [address] [address], under license to [employer]. All rights reserved    Score:  Initial: 16 Most Recent: X (Date: -- )     Interpretation of Tool:  Represents activities that are increasingly more difficult (i.e. Bed mobility, Transfers, Gait). Score 24 23 22-20 19-15 14-10 9-7 6       Modifier CH CI CJ CK CL CM CN         · Mobility - Walking and Moving Around:               - CURRENT STATUS:    CK - 40%-59% impaired, limited or restricted               - GOAL STATUS:           CJ - 20%-39% impaired, limited or restricted               - D/C STATUS:                       ---------------To be determined---------------  [place] / Product Type: Medicare /       Medical Necessity:     · Patient is expected to demonstrate progress in strength, range of motion and balance to decrease assistance required with theraputic exercises and functional mobility. Reason for Services/Other Comments:  · Patient continues to require present interventions due to patient's inability to perform theraputic exercises and functional mobility independently. Use of outcome tool(s) and clinical judgement create a POC that gives a: Questionable prediction of patient's progress: MODERATE COMPLEXITY                 TREATMENT:   (In addition to Assessment/Re-Assessment sessions the following treatments were rendered)   Pre-treatment Symptoms/Complaints:  Mild SOB, fatigue  Pain: Initial:      Post Session:  0      Therapeutic Activity: (  20 Minutes ):  Therapeutic activities including Bed transfers, Chair transfers and gait to improve mobility, strength and balance. Required minimal Safety awareness training;Verbal cues to promote static and dynamic balance in standing. Braces/Orthotics/Lines/Etc:   · IV  · O2 Device: Room air  Treatment/Session Assessment:    · Response to Treatment:  Pt. With general fatigue, unsteady on her feet. · Interdisciplinary Collaboration:  · Registered Nurse and Rehabilitation Attendant  · After treatment position/precautions:  · Up in chair, Bed alarm/tab alert on, Bed/Chair-wheels locked, Bed in low position and Call light within reach  · Compliance with Program/Exercises: Will assess as treatment progresses. No questions. · Recommendations/Intent for next treatment session: \"Next visit will focus on advancements to more challenging activities and reduction in assistance provided\".   Total Treatment Duration:PT Patient Time In/Time Out  Time In: 3796  Time Out: [address], PT

## 2017-10-03 NOTE — PROGRESS NOTES
Admit Date: 9/29/2017      Subjective:          Review of Systems  coughCardio-vascular: no chest pain, no SOB  GI: no N/V/D  : no dysuria, no hematuria    Objective:     Patient Vitals for the past 8 hrs:   BP Temp Pulse Resp SpO2   10/03/17 1417 110/66 96 °F (35.6 °C) 70 20 90 %   10/03/17 1142 95/60 97.6 °F (36.4 °C) 70 20 94 %   10/03/17 0758 118/62 96 °F (35.6 °C) 72 18 95 %   10/03/17 0742 - - - - 92 %            Physical Exam:   Lungs: decreased BS  CV: RR,   Abdomen: soft, not tender, no rebound. Ext: +++ edema          Data Review No results found for this or any previous visit (from the past 8 hour(s)). Assessment:     Principal Problem:    Acute respiratory failure with hypoxemia ([facility] (9/29/2017)    Active Problems:    HTN (hypertension) (9/4/2012)      Acute renal failure superimposed on stage 3 chronic kidney disease (HCC) ()      Left bundle branch block (LBBB) ()      Chronic systolic heart failure ([address] (7/28/2017)      Bilateral pneumonia (9/29/2017)        Plan:     Stop IVF if po intake ok.   F/u    Usman Roque MD

## 2017-10-04 NOTE — CONSULTS
Infectious Disease Consult    Today's Date: 10/4/2017   Admit Date: 9/29/2017    Impression:   · VRE grew in urine culture  · Recent frequent E-coli urine cx  · CHF; EF 20% s/p recent AICD/PM placement; BNP 5536  · Confusion  · Cough  · ASHIA on chronic  · Wrist HW ; s/p fracture  · Bilateral LL and right middle lobe infiltrates (CT chest)    Plan:   · Stop abx; she has had 5 days Rocephin and VRE is colonization  · ID will sign-off today     Anti-infectives:     Inpat Anti-Infectives     Start     Ordered Stop    10/03/17 2100  linezolid (ZYVOX) tablet 600 mg  600 mg,   Oral,   EVERY 12 HOURS      10/03/17 1830 --    10/02/17 1500  metroNIDAZOLE (FLAGYL) tablet 500 mg  500 mg,   Oral,   EVERY 12 HOURS      10/02/17 1457 --    09/30/17 1200  cefTRIAXone (ROCEPHIN) 2 g in 0.9% sodium chloride (MBP/ADV) 50 mL  2 g,   IntraVENous,   EVERY 24 HOURS      09/30/17 1115 --          Subjective:   Date of Consultation:  October 4, 2017  Referring Physician: Dr. Linda Elmore    Patient is a 80 y.o. female  with hx  ischemic cardiomyopathy, severe systolic CHF with EF ~52%, s/p AICD/PM placement, HTN and HLD who lives with grandson and grand daughter-in-law presented to the ED due to increasing confusion and persistent cough. The confusion was described as visual and auditory hallucinations. Patient also reportedly had been coughing up yellowish sputum, was seen by PCP 1 week ago, treated with a course of Doxycycline, no significant improvement reported. No fever or chills noted, No SOB was noted as well. Pt's lasix had been increased from 3x/week to every other day after patient gained 2lbs over her baseline weight of ~125KG? Ethelene Gauss She also had been eating poorly for the last 2 days. Patient has poor fluid intake at baseline. Other complaints noted by daughter in law is very poor sleep. Review of papers with vital from home shows baseline O2 on RA 93%. In the ER patient was hypoxic and was placed on biPap.  On 9/30/17 urine culture obtained and grew >100,000 VRE. Initial blood cultures 9/29 were negative. Urine cx grew >100,000 E-coli April, May, June, July & August 2017. CT chest w/o contrast : IMPRESSION: Cardiomegaly, pacemaker, infiltrates within the right middle lobe and bilateral lower lobes. 10/2 ABD U/S IMPRESSION:1. Small left renal cysts. 2. Gallbladder wall thickening. This is nonspecific. If there is further clinical concern for acute cholecystitis, a nuclear medicine hepatobiliary scan could be performed. 3. Abdominal ascites. Zyvox was started today. PO Flagyl was started 9/30/17. Rocephin was started 9/30/17. Looks like she was given one dose of LVQ 9/30/17.   ID is asked to evaluated this patient for recommendations regarding VRE UTI.       Patient Active Problem List   Diagnosis Code    CHF (congestive heart failure) ([facility] [facility] I50.9    HTN (hypertension) I10    HLD (hyperlipidemia) E78.5    CAD (coronary artery disease) I25.10    Osteoporosis M81.0    Anemia D64.9    Encounter for long-term (current) use of other medications Z79.899    Ischemic cardiomyopathy, chronic I25.5    Aortic valve regurgitation I35.1    AICD (automatic cardioverter/defibrillator) present Z95.810    S/P PTCA (percutaneous transluminal coronary angioplasty) Z98.61    Acute renal failure superimposed on stage 3 chronic kidney disease ([facility] [address] N17.9, N18.3    Left bundle branch block (LBBB) I44.7    Ventricular tachycardia (HCC) I47.2    Essential hypertension I10    Biventricular ICD (implantable cardioverter-defibrillator) in place Z95.810    Monteggia fracture, closed S52.279A    Hypotension I95.9    Localized edema R60.0    ASHIA (acute kidney injury) ([facility] [address] N17.9    Acute cystitis N30.00    Chronic systolic heart failure (HCC) I50.22    Acute respiratory failure with hypoxemia (HCC) J96.01    Bilateral pneumonia J18.9     Past Medical History:   Diagnosis Date    AICD (automatic cardioverter/defibrillator) present     Anemia 10/25/2012    Aortic valve regurgitation     Arrhythmia     blessing, pacemaker, a fib    CAD (coronary artery disease) 9/4/2012    stent    CHF (congestive heart failure) ([facility] [address] 9/4/2012    CHF (congestive heart failure) ([facility] [address] 9/4/2012    Chronic kidney disease, stage II (mild)     Chronic systolic heart failure ([facility] [address] 4/16/2013    Diverticulitis     Encounter for long-term (current) use of other medications 11/19/2012    Esophagitis 2010    EGD    Falls     Femur fracture, left ([address] 12/02/2016    Fracture of left radius 12/02/2016    left radius and ulna fx    Fracture of wrist 2003    left/pins    HLD (hyperlipidemia) 9/4/2012    HTN (hypertension) 9/4/2012    pt denies    Insomnia     Ischemic cardiomyopathy, chronic     Left bundle branch block (LBBB)     Osteoporosis 9/4/2012    S/P PTCA (percutaneous transluminal coronary angioplasty)     Sigmoid stricture ([facility] [address]     Dr. Erenest Nissen    Stiffness in joint     left hip, left wrist    Varicose vein 2011    surg/ Dr. Boston Comfort      History reviewed. No pertinent family history. Social History   Substance Use Topics    Smoking status: Never Smoker    Smokeless tobacco: Never Used    Alcohol use Yes      Comment: occasional     Past Surgical History:   Procedure Laterality Date    HX CYST REMOVAL  2009    right hand    HX GYN      hysterectomy, bladder sling    HX HIP REPLACEMENT      after fracture    HX IMPLANTABLE CARDIOVERTER DEFIBRILLATOR      HX ORTHOPAEDIC      lt hand    HX PTCA  2012      Prior to Admission medications    Medication Sig Start Date End Date Taking? Authorizing Provider   furosemide (LASIX) 20 mg tablet Take 1 tablet every other day. 9/20/17   Marisela Davenport MD   amiodarone (CORDARONE) 200 mg tablet Take 0.5 Tabs by mouth every morning. 5/11/17   Marisela Davenport MD   carvedilol (COREG) 3.125 mg tablet Take 1 Tab by mouth daily.  5/11/17   Mairsela Davenport MD   clopidogrel (PLAVIX) 75 mg tab Take 1 Tab by mouth daily. 17   Janneth Alcocer MD   cholecalciferol, VITAMIN D3, (VITAMIN D3) 5,000 unit tab tablet Take 1 Tab by mouth daily. 4/3/17   Karey Case MD   ferrous sulfate 325 mg (65 mg iron) tablet Take 1 Tab by mouth Daily (before breakfast). 4/3/17   Karey Case MD   aspirin delayed-release 81 mg tablet Take 1 Tab by mouth daily. 3/6/17   Janneth Alcocer MD   nitroglycerin (NITROSTAT) 0.4 mg SL tablet 1 Tab by SubLINGual route every five (5) minutes as needed for Chest Pain. 16   Janneth Alcocer MD       Allergies   Allergen Reactions    Pcn [Penicillins] Unknown (comments)     [facility] Krishnamurthy at [facility] no allergies listed        Review of Systems:  A comprehensive review of systems was negative except for that written in the History of Present Illness. Objective:     Visit Vitals    /60 (BP 1 Location: Right arm, BP Patient Position: At rest)    Pulse 70    Temp 97.4 °F (36.3 °C)    Resp 20    Ht 5' 3\" (1.6 m)    Wt 65.3 kg (144 lb)    SpO2 93%    Breastfeeding No    BMI 25.51 kg/m2     Temp (24hrs), Av.4 °F (36.3 °C), Min:96 °F (35.6 °C), Max:98.1 °F (36.7 °C)       Lines:  Peripheral IV:            Physical Exam:    General:  Alert, cooperative, well noursished, well developed, appears stated age   Eyes:  Sclera anicteric. Pupils equally round and reactive to light. Mouth/Throat: Mucous membranes normal, oral pharynx clear   Neck: Supple   Lungs:   Clear to auscultation bilaterally, good effort   CV:  Regular rate and rhythm,(+) murmur, click, rub or gallop   Abdomen:   Soft, non-tender.  bowel sounds normal. non-distended   Extremities: No cyanosis or edema   Skin: Skin color, texture, turgor normal. no acute rash or lesions   Lymph nodes: Cervical and supraclavicular normal   Musculoskeletal: 2+ swelling, no deformity   Lines/Devices:  Intact, no erythema, drainage or tenderness   Psych: Alert and oriented, normal mood affect given the setting         Data Review: CBC:Recent Labs      10/04/17   0618  10/03/17   0624  10/02/17   0612   WBC  6.1  7.1  6.9   GRANS  81*  83*  82*   MONOS  11  9  8   EOS  1  1  1   ANEU  4.9  5.9  5.6   ABL  0.4*  0.5  0.6   HGB  12.7  13.2  12.7   HCT  38.4  40.9  40.5   PLT  169  181  192       BMP:  Recent Labs      10/04/17   0618  10/03/17   0624  10/02/17   0612   CREA  2.12*  2.28*  2.40*   BUN  73*  74*  77*   NA  139  140  142   K  4.6  4.6  4.4   CL  106  106  107   CO2  20*  20*  23   AGAP  13  14  12   GLU  85  91  104*       LFTS:  No results for input(s): TBILI, ALT, SGOT, AP, TP, ALB in the last 72 hours.     Microbiology:     All Micro Results     Procedure Component Value Units Date/Time    CULTURE, BLOOD [[id number]] Collected:  09/29/17 1205    Order Status:  Completed Specimen:  Blood from Blood Updated:  10/04/17 0631     Special Requests: --        RIGHT  ARM       Culture result: NO GROWTH 5 DAYS       CULTURE, BLOOD [[id number]] Collected:  09/29/17 1207    Order Status:  Completed Specimen:  Blood from Blood Updated:  10/04/17 0631     Special Requests: --        RIGHT  Antecubital       Culture result: NO GROWTH 5 DAYS       CULTURE, URINE [[id number]]  (Abnormal)  (Susceptibility) Collected:  09/30/17 1147    Order Status:  Completed Specimen:  Urine from Keys Specimen Updated:  10/03/17 0819     Special Requests: NO SPECIAL REQUESTS        Culture result:         >100,000 COLONIES/mL **VANCOMYCIN RESISTANT ENTEROCOCCUS FAECIUM** (A)            RESULTS VERIFIED, PHONED TO AND READ BACK BY  Rick Taylor TO  ON 10/3/17 AT 0809      CULTURE, RESPIRATORY/SPUTUM/BRONCH Lorretta Ritesh [[id number]] Collected:  09/30/17 1100    Order Status:  Canceled Specimen:  Sputum from Sputum     INFLUENZA A & B AG (RAPID TEST) [[id number]] Collected:  09/29/17 1207    Order Status:  Completed Specimen:  Nasopharyngeal from Nasal washing Updated:  09/29/17 1234     Influenza A Ag NEGATIVE          NEGATIVE FOR THE PRESENCE OF INFLUENZA A ANTIGEN  INFECTION DUE TO INFLUENZA A CANNOT BE RULED OUT. BECAUSE THE ANTIGEN PRESENT IN THE SAMPLE MAY BE BELOW  THE DETECTION LIMIT OF THE TEST. A NEGATIVE TEST IS PRESUMPTIVE AND IT IS RECOMMENDED THAT THESE RESULTS BE CONFIRMED BY VIRAL CULTURE OR AN FDA-CLEARED INFLUENZA A AND B MOLECULAR ASSAY. Influenza B Ag NEGATIVE          NEGATIVE FOR THE PRESENCE OF INFLUENZA B ANTIGEN  INFECTION DUE TO INFLUENZA B CANNOT BE RULED OUT. BECAUSE THE ANTIGEN PRESENT IN THE SAMPLE MAY BE BELOW  THE DETECTION LIMIT OF THE TEST. A NEGATIVE TEST IS PRESUMPTIVE AND IT IS RECOMMENDED THAT THESE RESULTS BE CONFIRMED BY VIRAL CULTURE OR AN FDA-CLEARED INFLUENZA A AND B MOLECULAR ASSAY.                Imaging:   Listed in H&P    Signed By: Justine Gibson NP     October 4, 2017

## 2017-10-04 NOTE — PROGRESS NOTES
Problem: Interdisciplinary Rounds  Goal: Interdisciplinary Rounds  Interdisciplinary team rounds were held 10/4/2017 with the following team members:Care Management, Nursing, Nutrition, Patient Relations, Pharmacy and Physician. Plan of care discussed. See clinical pathway and/or care plan for interventions and desired outcomes.

## 2017-10-04 NOTE — PROGRESS NOTES
AM assessment completed. Pt alert and oriented x2. Resting in recliner, pt getting up with 2 person assist and walker. Respirations even and unlabored, lung sounds coarse bilaterally on room air. Pt denies SOB/chest pain. Denies any complaints, only complaining about food and cough. Pt has pitting edema to BLLE. Skin fragile. Pt denies needs. All safety measures in place.

## 2017-10-04 NOTE — PROGRESS NOTES
Problem: Mobility Impaired (Adult and Pediatric)  Goal: *Acute Goals and Plan of Care (Insert Text)  STG:  (1.)Ms. Piotr Muniz will move from supine to sit and sit to supine with MINIMAL ASSIST within 3 day(s). (2.)Ms. Piotr Muniz will transfer from bed to chair and chair to bed with CONTACT GUARD ASSIST using the least restrictive device within 3 day(s). (3.)Ms. Piotr Muniz will ambulate with CONTACT GUARD ASSIST for 20 feet with the least restrictive device within 3 day(s). LTG:  (1.)Ms. Piotr Muniz will move from supine to sit and sit to supine in bed with STAND BY ASSIST within 7 day(s). (2.)Ms. Piotr Muniz will transfer from bed to chair and chair to bed with STAND BY ASSIST using the least restrictive device within 7 day(s). (3.)Ms. Piotr Muniz will ambulate with STAND BY ASSIST for 75 feet with the least restrictive device within 7 day(s). (4)Ms. Piotr Muniz will go up and down 4 steps with rail in 7 days. (5)Ms. Piotr Muniz will perform HEP with cues and assistance in 7 days. ________________________________________________________________________________________________       PHYSICAL THERAPY: Daily Note, Treatment Day: 2nd and AM 10/4/2017  INPATIENT: Hospital Day: 6  [place] / Product Type: Medicare /      NAME/AGE/GENDER: Tonia Lang is a 80 y.o. female           PRIMARY DIAGNOSIS: Acute respiratory failure with hypoxemia ([address] Acute respiratory failure with hypoxemia (HCC) Acute respiratory failure with hypoxemia (HCC)        ICD-10: Treatment Diagnosis:       · Generalized Muscle Weakness (M62.81)  · Other abnormalities of gait and mobility (R26.89)   Precaution/Allergies:  Pcn [penicillins]       ASSESSMENT:      Ms. Piotr Muniz presents with general debility with decreased in functional mobility and gait. Pt agreeable to take some steps with therapy but then requested to sit down. Pt states she will do more if therapy could come back later.  Tried to encourage pt to continue to try, but pt adamant. This section established at most recent assessment   PROBLEM LIST (Impairments causing functional limitations):  1. Decreased Strength  2. Decreased ADL/Functional Activities  3. Decreased Transfer Abilities  4. Decreased Ambulation Ability/Technique  5. Decreased Balance  6. Decreased Activity Tolerance  7. Decreased Bath with Home Exercise Program    INTERVENTIONS PLANNED: (Benefits and precautions of physical therapy have been discussed with the patient.)  1. Balance Exercise  2. Bed Mobility  3. Family Education  4. Gait Training  5. Home Exercise Program (HEP)  6. Range of Motion (ROM)  7. Therapeutic Activites  8. Therapeutic Exercise/Strengthening  9. Transfer Training      TREATMENT PLAN: Frequency/Duration: daily for duration of hospital stay  Rehabilitation Potential For Stated Goals: Cara Bowling REHABILITATION/EQUIPMENT: (at time of discharge pending progress): Due to the probability of continued deficits (see above) this patient will likely need continued skilled physical therapy after discharge. Equipment:   · None at this time                   HISTORY:   History of Present Injury/Illness (Reason for Referral):  89F with known ischemic cardiomyopathy, severe systolic CHF with EF ~11%, s/p AICD/PM placement, HTN and HLD who lives with grandson and grand daughter-in-law presented to the ED due to increasing confusion and persistent cough  The confusion was described as visual and auditory hallucinations. Patient also reportedly had been coughing up yellowish sputum, was seen by PCP 1 week ago, treated with a course of Doxycycline, no significant improvement reported. No fever or chills noted, No SOB was noted as well  Pt's lasix had been increased from 3x/week to every other day after patient gained 2lbs over her baseline weight of ~125KG? [facility] She also had been eating poorly for the last 2 days. Patient has poor fluid intake at baseline.    Other complaints noted by daughter in law is very poor sleep. Review of papers with vital from home shows baseline O2 on RA 93%. In the ER patient was hypoxic and was placed on NIPPV with improvement in Oxygenation  Past Medical History/Comorbidities:   Ms. Chau Gardner  has a past medical history of AICD (automatic cardioverter/defibrillator) present; Anemia (10/25/2012); Aortic valve regurgitation; Arrhythmia; CAD (coronary artery disease) (9/4/2012); CHF (congestive heart failure) ([address] (9/4/2012); CHF (congestive heart failure) ([address] (9/4/2012); Chronic kidney disease, stage II (mild); Chronic systolic heart failure ([address] (4/16/2013); Diverticulitis; Encounter for long-term (current) use of other medications (11/19/2012); Esophagitis (2010); Falls; Femur fracture, left ([address] (12/02/2016); Fracture of left radius (12/02/2016); Fracture of wrist (2003); HLD (hyperlipidemia) (9/4/2012); HTN (hypertension) (9/4/2012); Insomnia; Ischemic cardiomyopathy, chronic; Left bundle branch block (LBBB); Osteoporosis (9/4/2012); S/P PTCA (percutaneous transluminal coronary angioplasty); Sigmoid stricture ([address] Stiffness in joint; and Varicose vein (2011). Ms. Chau Gardner  has a past surgical history that includes orthopaedic; ptca (2012); cyst removal (2009); gyn; implantable cardioverter defibrillator; and hip replacement.   Social History/Living Environment:   Home Environment: Private residence  # Steps to Enter: 1  One/Two Story Residence: One story  Living Alone: No  Support Systems: Child(nkechi)  Patient Expects to be Discharged to[de-identified] Private residence  Current DME Used/Available at Home: Raised toilet seat, Shower chair, Transfer bench, Walker, rolling, Wheelchair  Tub or Shower Type: Shower  Prior Level of Function/Work/Activity:  She reports independence with gait and adls   Number of Personal Factors/Comorbidities that affect the Plan of Care: 3+: HIGH COMPLEXITY   EXAMINATION:   Most Recent Physical Functioning:   Gross Assessment: Posture:     Balance:  Sitting: Intact  Standing: Pull to stand; With support Bed Mobility:     Wheelchair Mobility:     Transfers:  Sit to Stand: Contact guard assistance  Stand to Sit: Contact guard assistance  Gait:     Gait Abnormalities: Antalgic  Distance (ft): 6 Feet (ft)  Assistive Device: Walker, rolling  Ambulation - Level of Assistance: Minimal assistance  Interventions: Verbal cues       Body Structures Involved:  1. Bones  2. Joints  3. Muscles  4. Ligaments Body Functions Affected:  1. Movement Related Activities and Participation Affected:  1. Mobility   Number of elements that affect the Plan of Care: 3: MODERATE COMPLEXITY   CLINICAL PRESENTATION:   Presentation: Stable and uncomplicated: LOW COMPLEXITY   CLINICAL DECISION MAKIN[address] [place] [id number] AM-PAC 6 Clicks   Basic Mobility Inpatient Short Form  How much difficulty does the patient currently have. .. Unable A Lot A Little None   1. Turning over in bed (including adjusting bedclothes, sheets and blankets)? [ ] 1   [ ] 2   [X] 3   [ ] 4   2. Sitting down on and standing up from a chair with arms ( e.g., wheelchair, bedside commode, etc.)   [ ] 1   [ ] 2   [X] 3   [ ] 4   3. Moving from lying on back to sitting on the side of the bed? [ ] 1   [ ] 2   [X] 3   [ ] 4   How much help from another person does the patient currently need. .. Total A Lot A Little None   4. Moving to and from a bed to a chair (including a wheelchair)? [ ] 1   [ ] 2   [X] 3   [ ] 4   5. Need to walk in hospital room? [ ] 1   [X] 2   [ ] 3   [ ] 4   6. Climbing 3-5 steps with a railing? [ ] 1   [X] 2   [ ] 3   [ ] 4   © , Trustees of [address] [place] [id number], under license to [employer]. All rights reserved    Score:  Initial: 16 Most Recent: X (Date: -- )     Interpretation of Tool:  Represents activities that are increasingly more difficult (i.e. Bed mobility, Transfers, Gait).        Score 24 23 22-20 19-15 14-10 9-7 6       Modifier CH CI CJ CK CL CM CN         · Mobility - Walking and Moving Around:               - CURRENT STATUS:    CK - 40%-59% impaired, limited or restricted               - GOAL STATUS:           CJ - 20%-39% impaired, limited or restricted               - D/C STATUS:                       ---------------To be determined---------------  [place] / Product Type: Medicare /       Medical Necessity:     · Patient is expected to demonstrate progress in strength, range of motion and balance to decrease assistance required with theraputic exercises and functional mobility. Reason for Services/Other Comments:  · Patient continues to require present interventions due to patient's inability to perform theraputic exercises and functional mobility independently. Use of outcome tool(s) and clinical judgement create a POC that gives a: Questionable prediction of patient's progress: MODERATE COMPLEXITY                 TREATMENT:   (In addition to Assessment/Re-Assessment sessions the following treatments were rendered)   Pre-treatment Symptoms/Complaints:  Mild SOB, fatigue  Pain: Initial:   Pain Intensity 1: 0  Post Session:  0      Therapeutic Activity: (   15 minutes ):  Therapeutic activities including Bed transfers, Chair transfers and gait to improve mobility, strength and balance. Required minimal Verbal cues to promote static and dynamic balance in standing. Gait Training (  15 minutes):  Gait training to improve and/or restore physical functioning as related to mobility. Assistive Device: Walker, rolling  Ambulation - Level of Assistance: Minimal assistance  Distance (ft): 6 Feet (ft)  Interventions: Verbal cues         Braces/Orthotics/Lines/Etc:   · IV  · O2 Device: Room air  Treatment/Session Assessment:    · Response to Treatment:  Pt. With general fatigue, unsteady on her feet.   · Interdisciplinary Collaboration:  · Physical Therapist and Registered Nurse  · After treatment position/precautions:  · Up in chair, Bed alarm/tab alert on, Bed/Chair-wheels locked, Bed in low position and Call light within reach  · Compliance with Program/Exercises: Will assess as treatment progresses. No questions. · Recommendations/Intent for next treatment session: \"Next visit will focus on advancements to more challenging activities and reduction in assistance provided\".   Total Treatment Duration:PT Patient Time In/Time Out  Time In: 0945  Time Out: [facility],

## 2017-10-04 NOTE — PROGRESS NOTES
Problem: Mobility Impaired (Adult and Pediatric)  Goal: *Acute Goals and Plan of Care (Insert Text)  STG:  (1.)Ms. Ronnie Adams will move from supine to sit and sit to supine with MINIMAL ASSIST within 3 day(s). (2.)Ms. Ronnie Adams will transfer from bed to chair and chair to bed with CONTACT GUARD ASSIST using the least restrictive device within 3 day(s). (3.)Ms. Ronnie Adams will ambulate with CONTACT GUARD ASSIST for 20 feet with the least restrictive device within 3 day(s). LTG:  (1.)Ms. Ronnie Adams will move from supine to sit and sit to supine in bed with STAND BY ASSIST within 7 day(s). (2.)Ms. Ronnie Adams will transfer from bed to chair and chair to bed with STAND BY ASSIST using the least restrictive device within 7 day(s). (3.)Ms. Ronnie Adams will ambulate with STAND BY ASSIST for 75 feet with the least restrictive device within 7 day(s). (4)Ms. Ronnie Adams will go up and down 4 steps with rail in 7 days. (5)Ms. Ronnie Adams will perform HEP with cues and assistance in 7 days. ________________________________________________________________________________________________       PHYSICAL THERAPY: Daily Note, Treatment Day: 2nd and PM 10/4/2017  INPATIENT: Hospital Day: 6  [place] / Product Type: Medicare /      NAME/AGE/GENDER: Jesus Manuel Perea is a 80 y.o. female           PRIMARY DIAGNOSIS: Acute respiratory failure with hypoxemia ([address] Acute respiratory failure with hypoxemia (HCC) Acute respiratory failure with hypoxemia (HCC)        ICD-10: Treatment Diagnosis:       · Generalized Muscle Weakness (M62.81)  · Other abnormalities of gait and mobility (R26.89)   Precaution/Allergies:  Pcn [penicillins]       ASSESSMENT:      Ms. Ronnie Adams presents with general debility with decreased in functional mobility and gait. Pt agreeable to ambulate. Respiratory therapy present.       This section established at most recent assessment   PROBLEM LIST (Impairments causing functional limitations):  1. Decreased Strength  2. Decreased ADL/Functional Activities  3. Decreased Transfer Abilities  4. Decreased Ambulation Ability/Technique  5. Decreased Balance  6. Decreased Activity Tolerance  7. Decreased Roland with Home Exercise Program    INTERVENTIONS PLANNED: (Benefits and precautions of physical therapy have been discussed with the patient.)  1. Balance Exercise  2. Bed Mobility  3. Family Education  4. Gait Training  5. Home Exercise Program (HEP)  6. Range of Motion (ROM)  7. Therapeutic Activites  8. Therapeutic Exercise/Strengthening  9. Transfer Training      TREATMENT PLAN: Frequency/Duration: daily for duration of hospital stay  Rehabilitation Potential For Stated Goals: Suzette Guzman REHABILITATION/EQUIPMENT: (at time of discharge pending progress): Due to the probability of continued deficits (see above) this patient will likely need continued skilled physical therapy after discharge. Equipment:   · None at this time                   HISTORY:   History of Present Injury/Illness (Reason for Referral):  89F with known ischemic cardiomyopathy, severe systolic CHF with EF ~14%, s/p AICD/PM placement, HTN and HLD who lives with grandson and grand daughter-in-law presented to the ED due to increasing confusion and persistent cough  The confusion was described as visual and auditory hallucinations. Patient also reportedly had been coughing up yellowish sputum, was seen by PCP 1 week ago, treated with a course of Doxycycline, no significant improvement reported. No fever or chills noted, No SOB was noted as well  Pt's lasix had been increased from 3x/week to every other day after patient gained 2lbs over her baseline weight of ~125KG? Sudha Po She also had been eating poorly for the last 2 days. Patient has poor fluid intake at baseline. Other complaints noted by daughter in law is very poor sleep. Review of papers with vital from home shows baseline O2 on RA 93%.    In the ER patient was hypoxic and was placed on NIPPV with improvement in Oxygenation  Past Medical History/Comorbidities:   Ms. Marisa Landeros  has a past medical history of AICD (automatic cardioverter/defibrillator) present; Anemia (10/25/2012); Aortic valve regurgitation; Arrhythmia; CAD (coronary artery disease) (9/4/2012); CHF (congestive heart failure) ([facility] (9/4/2012); CHF (congestive heart failure) ([facility] (9/4/2012); Chronic kidney disease, stage II (mild); Chronic systolic heart failure ([address] (4/16/2013); Diverticulitis; Encounter for long-term (current) use of other medications (11/19/2012); Esophagitis (2010); Falls; Femur fracture, left ([facility] (12/02/2016); Fracture of left radius (12/02/2016); Fracture of wrist (2003); HLD (hyperlipidemia) (9/4/2012); HTN (hypertension) (9/4/2012); Insomnia; Ischemic cardiomyopathy, chronic; Left bundle branch block (LBBB); Osteoporosis (9/4/2012); S/P PTCA (percutaneous transluminal coronary angioplasty); Sigmoid stricture ([facility] Stiffness in joint; and Varicose vein (2011). Ms. Marisa Landeros  has a past surgical history that includes orthopaedic; ptca (2012); cyst removal (2009); gyn; implantable cardioverter defibrillator; and hip replacement. Social History/Living Environment:   Home Environment: Private residence  # Steps to Enter: 1  One/Two Story Residence: One story  Living Alone: No  Support Systems: Child(nkechi)  Patient Expects to be Discharged to[de-identified] Private residence  Current DME Used/Available at Home: Raised toilet seat, Shower chair, Transfer bench, Walker, rolling, Wheelchair  Tub or Shower Type: Shower  Prior Level of Function/Work/Activity:  She reports independence with gait and adls   Number of Personal Factors/Comorbidities that affect the Plan of Care: 3+: HIGH COMPLEXITY   EXAMINATION:   Most Recent Physical Functioning:   Gross Assessment:                  Posture:     Balance:  Sitting: Intact  Standing: Pull to stand; With support Bed Mobility:  Supine to Sit: Minimum assistance  Sit to Supine: Moderate assistance  Wheelchair Mobility:     Transfers:  Sit to Stand: Contact guard assistance  Stand to Sit: Contact guard assistance  Gait:     Gait Abnormalities: Other (slgihtly unsteady gait)  Distance (ft): 12 Feet (ft)  Assistive Device: Walker, rolling  Ambulation - Level of Assistance: Contact guard assistance  Interventions: Verbal cues       Body Structures Involved:  1. Bones  2. Joints  3. Muscles  4. Ligaments Body Functions Affected:  1. Movement Related Activities and Participation Affected:  1. Mobility   Number of elements that affect the Plan of Care: 3: MODERATE COMPLEXITY   CLINICAL PRESENTATION:   Presentation: Stable and uncomplicated: LOW COMPLEXITY   CLINICAL DECISION MAKIN[address] [place] [id number] AM-PAC 6 Clicks   Basic Mobility Inpatient Short Form  How much difficulty does the patient currently have. .. Unable A Lot A Little None   1. Turning over in bed (including adjusting bedclothes, sheets and blankets)? [ ] 1   [ ] 2   [X] 3   [ ] 4   2. Sitting down on and standing up from a chair with arms ( e.g., wheelchair, bedside commode, etc.)   [ ] 1   [ ] 2   [X] 3   [ ] 4   3. Moving from lying on back to sitting on the side of the bed? [ ] 1   [ ] 2   [X] 3   [ ] 4   How much help from another person does the patient currently need. .. Total A Lot A Little None   4. Moving to and from a bed to a chair (including a wheelchair)? [ ] 1   [ ] 2   [X] 3   [ ] 4   5. Need to walk in hospital room? [ ] 1   [X] 2   [ ] 3   [ ] 4   6. Climbing 3-5 steps with a railing? [ ] 1   [X] 2   [ ] 3   [ ] 4   © , Trustees of [address] [place] [id number], under license to [employer]. All rights reserved    Score:  Initial: 16 Most Recent: X (Date: -- )     Interpretation of Tool:  Represents activities that are increasingly more difficult (i.e. Bed mobility, Transfers, Gait).        Score 24 23 22-20 19-15 14-10 9-7 6       Modifier CH CI CJ CK CL CM CN · Mobility - Walking and Moving Around:               - CURRENT STATUS:    CK - 40%-59% impaired, limited or restricted               - GOAL STATUS:           CJ - 20%-39% impaired, limited or restricted               - D/C STATUS:                       ---------------To be determined---------------  [place] / [place] / Product Type: Medicare /       Medical Necessity:     · Patient is expected to demonstrate progress in strength, range of motion and balance to decrease assistance required with theraputic exercises and functional mobility. Reason for Services/Other Comments:  · Patient continues to require present interventions due to patient's inability to perform theraputic exercises and functional mobility independently. Use of outcome tool(s) and clinical judgement create a POC that gives a: Questionable prediction of patient's progress: MODERATE COMPLEXITY                 TREATMENT:   (In addition to Assessment/Re-Assessment sessions the following treatments were rendered)   Pre-treatment Symptoms/Complaints:  Mild SOB, fatigue  Pain: Initial:   Pain Intensity 1: 0  Post Session:  0      Therapeutic Activity: (   15 minutes ):  Therapeutic activities including Bed transfers, Chair transfers and gait to improve mobility, strength and balance. Required minimal Verbal cues to promote static and dynamic balance in standing. Gait Training (  10 minutes):  Gait training to improve and/or restore physical functioning as related to mobility. Assistive Device: Walker, rolling  Ambulation - Level of Assistance: Contact guard assistance  Distance (ft): 12 Feet (ft)  Interventions: Verbal cues         Braces/Orthotics/Lines/Etc:   · IV and O2 on two liters  Treatment/Session Assessment:    · Response to Treatment: oxygen percentage increased on oxygen from when on room air. Per respiratory, pt's sats are 88% on room air .   · Interdisciplinary Collaboration:  · Physical Therapist and Registered Nurse  · After treatment position/precautions:  · Up in chair, Bed alarm/tab alert on, Bed/Chair-wheels locked, Bed in low position and Call light within reach  · Compliance with Program/Exercises: Will assess as treatment progresses. No questions. · Recommendations/Intent for next treatment session: \"Next visit will focus on advancements to more challenging activities and reduction in assistance provided\".   Total Treatment Duration:PT Patient Time In/Time Out  Time In: 1400  Time Out: [address], PT

## 2017-10-04 NOTE — PROGRESS NOTES
Oxygen Qualifier       Room air: SpO2 with O2 and liter flow   Resting SpO2  87%  96% on  2 L NC   Ambulating SpO2  R/A  92% on 2 L NC     Patient's sat while resting was 87% on RA before ambulation, while ambulating patient required 2 L of oxygen to maintain sat above protocol. Patient denied SOB, and no distress noted while ambulating.    Completed by:    Surya York, RT

## 2017-10-04 NOTE — PROGRESS NOTES
Shift assessment complete. Assisted pt back to bed from bedside chair. No signs of acute distress. Alert and oriented x3. Resp even and unlabored. Pt c/o cough, will medicate. Call light within reach. Pt instructed to call for assistance.

## 2017-10-04 NOTE — PROGRESS NOTES
[facility]    Follow-Up on: ASHIA on CKD    HPI: feels better    ROS:  Denies CP, SOB.     Current Facility-Administered Medications   Medication Dose Route Frequency    albuterol-ipratropium (DUO-NEB) 2.5 MG-0.5 MG/3 ML  3 mL Nebulization Q6H RT    guaiFENesin-dextromethorphan (ROBITUSSIN DM) 100-10 mg/5 mL syrup 10 mL  10 mL Oral Q6H PRN    amiodarone (CORDARONE) tablet 100 mg  100 mg Oral DAILY    carvedilol (COREG) tablet 3.125 mg  3.125 mg Oral DAILY    clopidogrel (PLAVIX) tablet 75 mg  75 mg Oral DAILY    sodium chloride (NS) flush 5-10 mL  5-10 mL IntraVENous Q8H    sodium chloride (NS) flush 5-10 mL  5-10 mL IntraVENous PRN    acetaminophen (TYLENOL) tablet 650 mg  650 mg Oral Q4H PRN    ondansetron (ZOFRAN) injection 4 mg  4 mg IntraVENous Q4H PRN    senna-docusate (PERICOLACE) 8.6-50 mg per tablet 1 Tab  1 Tab Oral BID PRN    heparin (porcine) injection 5,000 Units  5,000 Units SubCUTAneous Q8H       Exam:  Vitals:    10/04/17 0706 10/04/17 0758 10/04/17 1050 10/04/17 1426   BP: 107/60  99/48 121/73   Pulse: 70  70 70   Resp: 20  20 20   Temp: 97.4 °F (36.3 °C)  97.4 °F (36.3 °C) 96 °F (35.6 °C)   SpO2: 90% 93% 93% 96%   Weight:       Height:             Intake/Output Summary (Last 24 hours) at 10/04/17 1528  Last data filed at 10/04/17 1251   Gross per 24 hour   Intake                0 ml   Output              200 ml   Net             -200 ml     PE:  GEN - in no distress  CV - regular, no murmur, no rub  Lung - clear bilaterally  Abd - soft, nontender  Ext - no edema    Labs  Recent Labs      10/04/17   0618  10/03/17   0624  10/02/17   0612   WBC  6.1  7.1  6.9   HGB  12.7  13.2  12.7   HCT  38.4  40.9  40.5   PLT  169  181  192     Recent Labs      10/04/17   0618  10/03/17   0624  10/02/17   0612   NA  139  140  142   K  4.6  4.6  4.4   CL  106  106  107   CO2  20*  20*  23   BUN  73*  74*  77*   CREA  2.12*  2.28*  2.40*   GLU  85  91  104*   CA  8.8  8.7  8.9     No results for input(s): PH, PCO2, PO2, PCO2 in the last 72 hours. Problem List:  Patient Active Problem List    Diagnosis Date Noted    Acute respiratory failure with hypoxemia ([facility] 09/29/2017    Bilateral pneumonia 09/29/2017    Chronic systolic heart failure ([address] 07/28/2017    ASHIA (acute kidney injury) ([facility] 07/17/2017    Acute cystitis 07/17/2017    Localized edema 06/22/2017    Hypotension 05/24/2017    [place] fracture, closed 01/12/2017    Biventricular ICD (implantable cardioverter-defibrillator) in place 12/22/2016    Essential hypertension 10/14/2016    Ventricular tachycardia ([address] 04/21/2016    Ischemic cardiomyopathy, chronic     Aortic valve regurgitation     AICD (automatic cardioverter/defibrillator) present     S/P PTCA (percutaneous transluminal coronary angioplasty)     Acute renal failure superimposed on stage 3 chronic kidney disease ([address]     Left bundle branch block (LBBB)     Encounter for long-term (current) use of other medications 11/19/2012    Anemia 10/25/2012    CHF (congestive heart failure) ([facility] 09/04/2012    HTN (hypertension) 09/04/2012    HLD (hyperlipidemia) 09/04/2012    CAD (coronary artery disease) 09/04/2012    Osteoporosis 09/04/2012       Issues Addressed By Nephrology:    Plan:  1. AKIon CKD  2. VRE in urine more than likely colonized  3.  Encephalopathy resolved  Overall at her baseline agree with current management, I will sign off  Case d/w Dr. Grazyna Estrada

## 2017-10-04 NOTE — PROGRESS NOTES
Pt helped back to bed after resting in chair all day. Pt not eating much, does not like food at hospital. No other complaints voiced.

## 2017-10-04 NOTE — PROGRESS NOTES
Progress Note    Patient: Kia De La Garza MRN: [id number]  SSN: [id number]    YOB: 1926  Age: 80 y.o. Sex: female      Admit Date: 9/29/2017    LOS: 5 days     Subjective: The patient was seen at the bedside today. She continues to feel better. Her cough has significantly improved. Urine culture grew VRE and the patient was started on Linezolid. Her renal function continues to improve. Objective:     Vitals:    10/04/17 0320 10/04/17 0706 10/04/17 0758 10/04/17 1050   BP: 118/61 107/60  99/48   Pulse: 81 70  70   Resp: 20 20  20   Temp: 97.7 °F (36.5 °C) 97.4 °F (36.3 °C)  97.4 °F (36.3 °C)   SpO2: 94% 90% 93% 93%   Weight:       Height:            Intake and Output:  Current Shift: 10/04 0701 - 10/04 1900  In: -   Out: 200 [Urine:200]  Last three shifts: 10/02 1901 - 10/04 0700  In: 1398 [I.V.:1398]  Out: 200 [Urine:200]    Physical Exam:   GENERAL: alert, fatigued, cooperative, no distress, appears stated age  LUNG: clear to auscultation bilaterally, diminished breath sounds R base  HEART: regular rate and rhythm, S1, S2 normal, no murmur, click, rub or gallop  ABDOMEN: soft, non-tender. Bowel sounds normal. No masses,  no organomegaly  EXTREMITIES:  3+ pitting edema in the LE bilaterally, improving    Lab/Data Review: All lab results for the last 24 hours reviewed. Hb 12.7, WBC 6.1, Cr 2.12    Assessment:     Principal Problem:    Acute respiratory failure with hypoxemia (HCC) (9/29/2017)    Active Problems:    HTN (hypertension) (9/4/2012)      Acute renal failure superimposed on stage 3 chronic kidney disease (HCC) ()      Left bundle branch block (LBBB) ()      Chronic systolic heart failure ([address] (7/28/2017)      Bilateral pneumonia (9/29/2017)        Plan:     1 - R middle lobe Pneumonia, slowly improving. Continue current antibiotics. The patient will be seen by ID today. 2 - UTI due to VRE. Patient on Linezolid. ID consultation  today.    3 - Chronic Systolic CHF s/p AICD, stable. Lasix was discontinued b/o ASHIA, and now the patient has 3+ pitting edema in the LE, which has slightly improved since the IV fluids were stopped yesterday. Continue to hold Lasix and resume when clinically appropriate. 4 - ASHIA on CKD stage 4. Cr came down to 2. 12. The patient is followed by Nephrology, appreciate their input. Continue to hold Lasix. 5 - HTN, controlled. Continue current medications. 6 - DVT Prophylaxis with Heparin SQ.  7 - Disposition: possible discharge home within the next 24-48 hours pending further clinical improvement.      Signed By: Jean-Paul Sutherland MD     October 4, 2017

## 2017-10-05 NOTE — PROGRESS NOTES
Pneumonia education complete. Patient given information. Patient verbalized understanding of disease process. All questions answered.

## 2017-10-05 NOTE — PROGRESS NOTES
AM Assessment. Pt. A/O to person, place, and time. Disoriented to situation with periods of confusion. Respirations even and unlabored. Lungs coarse and slightly diminished. Bowel sounds active X4 quadrants. Abd. Soft and non tender. IV patent. Granddaughter in-law at bedside. Call light within reach.

## 2017-10-05 NOTE — PROGRESS NOTES
Discharge instructions and prescriptions provided and explained to the pt. Med side effect sheet reviewed. Opportunity for questions provided. Pt is waiting on oxygen. Instructed to call once ready to leave.

## 2017-10-05 NOTE — PROGRESS NOTES
Sw informed of pt's discharge today. Previous Sw had ordered Rn and PT [facility]. Md informed pt needs home 02 at 2liters per minute continuous. Harrison spoke with pt's granddaugther to inform. Sw ordered home 02 through [employer] and portable has been delivered to pt's room. Family will transport pt home. No new needs iden.  Zach Avelar

## 2017-10-05 NOTE — PROGRESS NOTES
Problem: Mobility Impaired (Adult and Pediatric)  Goal: *Acute Goals and Plan of Care (Insert Text)  STG:  (1.)Ms. Nasrin Mojica will move from supine to sit and sit to supine with MINIMAL ASSIST within 3 day(s). (2.)Ms. Nasrin Mojiac will transfer from bed to chair and chair to bed with CONTACT GUARD ASSIST using the least restrictive device within 3 day(s). (3.)Ms. Nasrin Mojica will ambulate with CONTACT GUARD ASSIST for 20 feet with the least restrictive device within 3 day(s). LTG:  (1.)Ms. Nasrin Mojica will move from supine to sit and sit to supine in bed with STAND BY ASSIST within 7 day(s). (2.)Ms. Nasrin Mojica will transfer from bed to chair and chair to bed with STAND BY ASSIST using the least restrictive device within 7 day(s). (3.)Ms. Nasrin Mojica will ambulate with STAND BY ASSIST for 75 feet with the least restrictive device within 7 day(s). (4)Ms. Nasrin Mojica will go up and down 4 steps with rail in 7 days. (5)Ms. Nasrin Mojica will perform HEP with cues and assistance in 7 days. ________________________________________________________________________________________________       PHYSICAL THERAPY: Daily Note, Treatment Day: 3rd and AM 10/5/2017  INPATIENT: Hospital Day: 7  [place] / Product Type: Medicare /      NAME/AGE/GENDER: Celeste Carrillo is a 80 y.o. female           PRIMARY DIAGNOSIS: Acute respiratory failure with hypoxemia ([address] Acute respiratory failure with hypoxemia ([address] Acute respiratory failure with hypoxemia ([address]        ICD-10: Treatment Diagnosis:       · Generalized Muscle Weakness (M62.81)  · Other abnormalities of gait and mobility (R26.89)   Precaution/Allergies:  Pcn [penicillins]       ASSESSMENT:      Ms. Nasrin Mojica presents with general debility with decreased in functional mobility and gait. Pt. With grand daughter in law in room. Pt. Wants to go home. She reports they have all equipment they need.   Pt. Only walked a few feet with walker and then reported she was too fatigued to continue. She is on 2 liters O2, mild SOB. Left with MD and family in room. No questions. This section established at most recent assessment   PROBLEM LIST (Impairments causing functional limitations):  1. Decreased Strength  2. Decreased ADL/Functional Activities  3. Decreased Transfer Abilities  4. Decreased Ambulation Ability/Technique  5. Decreased Balance  6. Decreased Activity Tolerance  7. Decreased [place] with Home Exercise Program    INTERVENTIONS PLANNED: (Benefits and precautions of physical therapy have been discussed with the patient.)  1. Balance Exercise  2. Bed Mobility  3. Family Education  4. Gait Training  5. Home Exercise Program (HEP)  6. Range of Motion (ROM)  7. Therapeutic Activites  8. Therapeutic Exercise/Strengthening  9. Transfer Training      TREATMENT PLAN: Frequency/Duration: daily for duration of hospital stay  Rehabilitation Potential For Stated Goals: Karen Hunter REHABILITATION/EQUIPMENT: (at time of discharge pending progress): Due to the probability of continued deficits (see above) this patient will likely need continued skilled physical therapy after discharge. Equipment:   · None at this time                   HISTORY:   History of Present Injury/Illness (Reason for Referral):  89F with known ischemic cardiomyopathy, severe systolic CHF with EF ~95%, s/p AICD/PM placement, HTN and HLD who lives with grandson and grand daughter-in-law presented to the ED due to increasing confusion and persistent cough  The confusion was described as visual and auditory hallucinations. Patient also reportedly had been coughing up yellowish sputum, was seen by PCP 1 week ago, treated with a course of Doxycycline, no significant improvement reported. No fever or chills noted, No SOB was noted as well  Pt's lasix had been increased from 3x/week to every other day after patient gained 2lbs over her baseline weight of ~125KG? [place]  She also had been eating poorly for the last 2 days. Patient has poor fluid intake at baseline. Other complaints noted by daughter in law is very poor sleep. Review of papers with vital from home shows baseline O2 on RA 93%. In the ER patient was hypoxic and was placed on NIPPV with improvement in Oxygenation  Past Medical History/Comorbidities:   Ms. Amina Perea  has a past medical history of AICD (automatic cardioverter/defibrillator) present; Anemia (10/25/2012); Aortic valve regurgitation; Arrhythmia; CAD (coronary artery disease) (9/4/2012); CHF (congestive heart failure) ([facility] (9/4/2012); CHF (congestive heart failure) ([facility] (9/4/2012); Chronic kidney disease, stage II (mild); Chronic systolic heart failure ([address] (4/16/2013); Diverticulitis; Encounter for long-term (current) use of other medications (11/19/2012); Esophagitis (2010); Falls; Femur fracture, left ([address] (12/02/2016); Fracture of left radius (12/02/2016); Fracture of wrist (2003); HLD (hyperlipidemia) (9/4/2012); HTN (hypertension) (9/4/2012); Insomnia; Ischemic cardiomyopathy, chronic; Left bundle branch block (LBBB); Osteoporosis (9/4/2012); S/P PTCA (percutaneous transluminal coronary angioplasty); Sigmoid stricture ([address] Stiffness in joint; and Varicose vein (2011). Ms. Amina Perea  has a past surgical history that includes orthopaedic; ptca (2012); cyst removal (2009); gyn; implantable cardioverter defibrillator; and hip replacement.   Social History/Living Environment:   Home Environment: Private residence  # Steps to Enter: 1  One/Two Story Residence: One story  Living Alone: No  Support Systems: Child(nkechi)  Patient Expects to be Discharged to[de-identified] Private residence  Current DME Used/Available at Home: Raised toilet seat, Shower chair, Transfer bench, Walker, rolling, Wheelchair  Tub or Shower Type: Shower  Prior Level of Function/Work/Activity:  She reports independence with gait and adls   Number of Personal Factors/Comorbidities that affect the Plan of Care: 3+: HIGH COMPLEXITY EXAMINATION:   Most Recent Physical Functioning:   Gross Assessment:  AROM: Generally decreased, functional (UE and LE for age)  Strength: Generally decreased, functional (UE and LE for age)               Posture:  Posture (WDL): Exceptions to WDL  Posture Assessment: Kyphosis, Forward head, Rounded shoulders, Trunk flexion  Balance:    Bed Mobility:  Supine to Sit:  (up in chair on contact)  Wheelchair Mobility:     Transfers:  Sit to Stand: Minimum assistance  Stand to Sit: Minimum assistance  Duration: 10 Minutes  Gait:     Speed/Brandy: Delayed; Shuffled  Step Length: Left shortened;Right shortened  Gait Abnormalities:  (slow and unsteady)  Distance (ft): 5 Feet (ft)  Assistive Device: Walker, rolling  Ambulation - Level of Assistance: Minimal assistance  Interventions: Safety awareness training;Verbal cues       Body Structures Involved:  1. Bones  2. Joints  3. Muscles  4. Ligaments Body Functions Affected:  1. Movement Related Activities and Participation Affected:  1. Mobility   Number of elements that affect the Plan of Care: 3: MODERATE COMPLEXITY   CLINICAL PRESENTATION:   Presentation: Stable and uncomplicated: LOW COMPLEXITY   CLINICAL DECISION MAKIN[address] [place], [place] [id number] AM-PAC 6 Clicks   Basic Mobility Inpatient Short Form  How much difficulty does the patient currently have. .. Unable A Lot A Little None   1. Turning over in bed (including adjusting bedclothes, sheets and blankets)? [ ] 1   [ ] 2   [X] 3   [ ] 4   2. Sitting down on and standing up from a chair with arms ( e.g., wheelchair, bedside commode, etc.)   [ ] 1   [ ] 2   [X] 3   [ ] 4   3. Moving from lying on back to sitting on the side of the bed? [ ] 1   [ ] 2   [X] 3   [ ] 4   How much help from another person does the patient currently need. .. Total A Lot A Little None   4. Moving to and from a bed to a chair (including a wheelchair)? [ ] 1   [ ] 2   [X] 3   [ ] 4   5. Need to walk in hospital room?    [ ] 1   [X] 2   [ ] 3   [ ] 4   6. Climbing 3-5 steps with a railing? [ ] 1   [X] 2   [ ] 3   [ ] 4   © 2007, Trustees of [address] [place] [address] [address] [address], under license to [employer]. All rights reserved    Score:  Initial: 16 Most Recent: X (Date: -- )     Interpretation of Tool:  Represents activities that are increasingly more difficult (i.e. Bed mobility, Transfers, Gait). Score 24 23 22-20 19-15 14-10 9-7 6       Modifier CH CI CJ CK CL CM CN         · Mobility - Walking and Moving Around:               - CURRENT STATUS:    CK - 40%-59% impaired, limited or restricted               - GOAL STATUS:           CJ - 20%-39% impaired, limited or restricted               - D/C STATUS:                       ---------------To be determined---------------  [place] / [place] / Product Type: Medicare /       Medical Necessity:     · Patient is expected to demonstrate progress in strength, range of motion and balance to decrease assistance required with theraputic exercises and functional mobility. Reason for Services/Other Comments:  · Patient continues to require present interventions due to patient's inability to perform theraputic exercises and functional mobility independently. Use of outcome tool(s) and clinical judgement create a POC that gives a: Questionable prediction of patient's progress: MODERATE COMPLEXITY                 TREATMENT:   (In addition to Assessment/Re-Assessment sessions the following treatments were rendered)   Pre-treatment Symptoms/Complaints:  Mild SOB, fatigue  Pain: Initial:      Post Session:  0      Therapeutic Activity: (  10 Minutes ):  Therapeutic activities including Bed transfers, Chair transfers and gait to improve mobility, strength and balance. Required minimal Safety awareness training;Verbal cues to promote static and dynamic balance in standing.           Braces/Orthotics/Lines/Etc:   · O2 on two liters  Treatment/Session Assessment:    · Response to Treatment: low activity tolerance. · Interdisciplinary Collaboration:  · Occupational Therapist and Registered Nurse  · After treatment position/precautions:  · Up in chair, Bed alarm/tab alert on, Bed/Chair-wheels locked, Bed in low position, Caregiver at bedside and Call light within reach  · Compliance with Program/Exercises: Will assess as treatment progresses. No questions. · Recommendations/Intent for next treatment session: \"Next visit will focus on advancements to more challenging activities and reduction in assistance provided\".   Total Treatment Duration:PT Patient Time In/Time Out  Time In: 3035  Time Out: CRISTO Gutierrez

## 2017-10-05 NOTE — DISCHARGE INSTRUCTIONS
DISCHARGE SUMMARY from Nurse    The following personal items are in your possession at time of discharge:    Dental Appliances: None  Visual Aid: At bedside, Glasses     Home Medications: None  Jewelry: Earrings  Clothing: None  Other Valuables: None  Personal Items Sent to Safe: none          PATIENT INSTRUCTIONS:    After general anesthesia or intravenous sedation, for 24 hours or while taking prescription Narcotics:  · Limit your activities  · Do not drive and operate hazardous machinery  · Do not make important personal or business decisions  · Do  not drink alcoholic beverages  · If you have not urinated within 8 hours after discharge, please contact your surgeon on call. Report the following to your surgeon:  · Excessive pain, swelling, redness or odor of or around the surgical area  · Temperature over 100.5  · Nausea and vomiting lasting longer than 4 hours or if unable to take medications  · Any signs of decreased circulation or nerve impairment to extremity: change in color, persistent  numbness, tingling, coldness or increase pain  · Any questions        What to do at Home:  Recommended activity: Activity as tolerated, per MD    If you experience any of the following symptoms fever>101, pain unrelieved with medication, nausea/vomiting, shortness of breath, dizziness/fainting, chest pain. , please follow up with your doctor. *  Please give a list of your current medications to your Primary Care Provider. *  Please update this list whenever your medications are discontinued, doses are      changed, or new medications (including over-the-counter products) are added. *  Please carry medication information at all times in case of emergency situations. These are general instructions for a healthy lifestyle:    No smoking/ No tobacco products/ Avoid exposure to second hand smoke    Surgeon General's Warning:  Quitting smoking now greatly reduces serious risk to your health.     Obesity, smoking, and sedentary lifestyle greatly increases your risk for illness    A healthy diet, regular physical exercise & weight monitoring are important for maintaining a healthy lifestyle    You may be retaining fluid if you have a history of heart failure or if you experience any of the following symptoms:  Weight gain of 3 pounds or more overnight or 5 pounds in a week, increased swelling in our hands or feet or shortness of breath while lying flat in bed. Please call your doctor as soon as you notice any of these symptoms; do not wait until your next office visit. Recognize signs and symptoms of STROKE:    F-face looks uneven    A-arms unable to move or move unevenly    S-speech slurred or non-existent    T-time-call 911 as soon as signs and symptoms begin-DO NOT go       Back to bed or wait to see if you get better-TIME IS BRAIN. Warning Signs of HEART ATTACK     Call 911 if you have these symptoms:   Chest discomfort. Most heart attacks involve discomfort in the center of the chest that lasts more than a few minutes, or that goes away and comes back. It can feel like uncomfortable pressure, squeezing, fullness, or pain.  Discomfort in other areas of the upper body. Symptoms can include pain or discomfort in one or both arms, the back, neck, jaw, or stomach.  Shortness of breath with or without chest discomfort.  Other signs may include breaking out in a cold sweat, nausea, or lightheadedness. Don't wait more than five minutes to call 911 - MINUTES MATTER! Fast action can save your life. Calling 911 is almost always the fastest way to get lifesaving treatment. Emergency Medical Services staff can begin treatment when they arrive -- up to an hour sooner than if someone gets to the hospital by car. The discharge information has been reviewed with the patient. The patient verbalized understanding.     Discharge medications reviewed with the patient and appropriate educational materials and side effects teaching were provided. Urinary Tract Infection in Women: Care Instructions  Your Care Instructions    A urinary tract infection, or UTI, is a general term for an infection anywhere between the kidneys and the urethra (where urine comes out). Most UTIs are bladder infections. They often cause pain or burning when you urinate. UTIs are caused by bacteria and can be cured with antibiotics. Be sure to complete your treatment so that the infection goes away. Follow-up care is a key part of your treatment and safety. Be sure to make and go to all appointments, and call your doctor if you are having problems. It's also a good idea to know your test results and keep a list of the medicines you take. How can you care for yourself at home? · Take your antibiotics as directed. Do not stop taking them just because you feel better. You need to take the full course of antibiotics. · Drink extra water and other fluids for the next day or two. This may help wash out the bacteria that are causing the infection. (If you have kidney, heart, or liver disease and have to limit fluids, talk with your doctor before you increase your fluid intake.)  · Avoid drinks that are carbonated or have caffeine. They can irritate the bladder. · Urinate often. Try to empty your bladder each time. · To relieve pain, take a hot bath or lay a heating pad set on low over your lower belly or genital area. Never go to sleep with a heating pad in place. To prevent UTIs  · Drink plenty of water each day. This helps you urinate often, which clears bacteria from your system. (If you have kidney, heart, or liver disease and have to limit fluids, talk with your doctor before you increase your fluid intake.)  · Urinate when you need to. · Urinate right after you have sex. · Change sanitary pads often. · Avoid douches, bubble baths, feminine hygiene sprays, and other feminine hygiene products that have deodorants.   · After going to the bathroom, wipe from front to back. When should you call for help? Call your doctor now or seek immediate medical care if:  · Symptoms such as fever, chills, nausea, or vomiting get worse or appear for the first time. · You have new pain in your back just below your rib cage. This is called flank pain. · There is new blood or pus in your urine. · You have any problems with your antibiotic medicine. Watch closely for changes in your health, and be sure to contact your doctor if:  · You are not getting better after taking an antibiotic for 2 days. · Your symptoms go away but then come back. Where can you learn more? Go to http://liu-keith.info/. Enter X075 in the search box to learn more about \"Urinary Tract Infection in Women: Care Instructions. \"  Current as of: November 28, 2016  Content Version: 11.3  © 3164-1390 [email or web address]. Care instructions adapted under license by [employer] (which disclaims liability or warranty for this information). If you have questions about a medical condition or this instruction, always ask your healthcare professional. [address] any warranty or liability for your use of this information.

## 2017-10-05 NOTE — DISCHARGE SUMMARY
Discharge Summary     Patient: Alo Cleary MRN: [id number]  SSN: [id number]    YOB: 1926  Age: 80 y.o.   Sex: female       Admit Date: 9/29/2017    Discharge Date: 10/5/2017      Admission Diagnoses: Acute respiratory failure with hypoxemia [facility])    Discharge Diagnoses:   Problem List as of 10/5/2017  Date Reviewed: 9/22/2017          Codes Class Noted - Resolved    * (Principal)Acute respiratory failure with hypoxemia ([facility] ICD-10-CM: J96.01  ICD-9-CM: 518.81  9/29/2017 - Present        Bilateral pneumonia ICD-10-CM: J18.9  ICD-9-CM: 486  9/29/2017 - Present        Chronic systolic heart failure ([facility] ICD-10-CM: I50.22  ICD-9-CM: 428.22  7/28/2017 - Present        ASHIA (acute kidney injury) ([facility] ICD-10-CM: N17.9  ICD-9-CM: 584.9  7/17/2017 - Present        Acute cystitis ICD-10-CM: N30.00  ICD-9-CM: 595.0  7/17/2017 - Present        Localized edema ICD-10-CM: R60.0  ICD-9-CM: 782.3  6/22/2017 - Present        Hypotension ICD-10-CM: I95.9  ICD-9-CM: 458.9  5/24/2017 - Present        Mon[place]ggia fracture, closed ICD-10-CM: S52.279A  ICD-9-CM: 813.03  1/12/2017 - Present        Biventricular ICD (implantable cardioverter-defibrillator) in place ICD-10-CM: Z95.810  ICD-9-CM: V45.02  12/22/2016 - Present        Essential hypertension ICD-10-CM: I10  ICD-9-CM: 401.9  10/14/2016 - Present        Ventricular tachycardia (HCC) ICD-10-CM: I47.2  ICD-9-CM: 427.1  4/21/2016 - Present        Ischemic cardiomyopathy, chronic ICD-10-CM: I25.5  ICD-9-CM: 414.8  Unknown - Present        Aortic valve regurgitation ICD-10-CM: I35.1  ICD-9-CM: 424.1  Unknown - Present        AICD (automatic cardioverter/defibrillator) present ICD-10-CM: Z95.810  ICD-9-CM: V45.02  Unknown - Present        S/P PTCA (percutaneous transluminal coronary angioplasty) ICD-10-CM: Z98.61  ICD-9-CM: V45.82  Unknown - Present    Overview Signed 3/22/2016 10:05 AM by Cherry Buitrago     Proximal LAD stent 7/2012             Acute renal failure superimposed on stage 3 chronic kidney disease (HCC) ICD-10-CM: N17.9, N18.3  ICD-9-CM: 584.9, 585.3  Unknown - Present        Left bundle branch block (LBBB) ICD-10-CM: I44.7  ICD-9-CM: 426.3  Unknown - Present        Encounter for long-term (current) use of other medications ICD-10-CM: Z79.899  ICD-9-CM: V58.69  11/19/2012 - Present        Anemia ICD-10-CM: D64.9  ICD-9-CM: 285.9  10/25/2012 - Present        CHF (congestive heart failure) (HCC) (Chronic) ICD-10-CM: I50.9  ICD-9-CM: 428.0  9/4/2012 - Present    Overview Signed 3/22/2016 10:04 AM by Magdiel Bonilla     right             HTN (hypertension) (Chronic) ICD-10-CM: I10  ICD-9-CM: 401.9  9/4/2012 - Present        HLD (hyperlipidemia) ICD-10-CM: E78.5  ICD-9-CM: 272.4  9/4/2012 - Present        CAD (coronary artery disease) ICD-10-CM: I25.10  ICD-9-CM: 414.00  9/4/2012 - Present    Overview Signed 3/22/2016 10:03 AM by Magdiel Bonilla     ASCAD of the native vessel             Osteoporosis ICD-10-CM: M81.0  ICD-9-CM: 733.00  9/4/2012 - Present        RESOLVED: Acute metabolic encephalopathy C-30-WA: G93.41  ICD-9-CM: 348.31  9/29/2017 - 9/30/2017               Discharge Condition: Stable    Hospital Course: The patient is a 79 y/o lady with Chronic Systolic CHF s/p AICD, HTN, Dyslipidemia, was initially admitted for Acute Hypoxemic Respiratory Failure secondary to R middle lobe Pneumonia and ASHIA on CKD stage 4. She was initially placed on BiPAP in the ICU with improvement of her oxygenation and was then transferred to the Medical Floor. She has received 5 days of IV antibiotics. Her urine culture grew VRE for which the patient was evaluated by ID who thinks that it is due to colonization. All the antibiotics were then discontinued, including Linezolid and Ceftriaxone, per ID recommendations. The patient will then not need any antibiotics at discharge. However the patient still requires oxygen supplementation, as her O2 sat dropped to 87% with minimal ambulation. The patient will need home O2 and also home health services at discharge. Her renal function has somewhat improved and is probably back to its baseline. Lasix remains on hold and the patient will see her Cardiologist as scheduled. The patient is feeling much better, she is clinically and hemodynamically stable and will be discharged home today with home health services. PE  VS: /57, HR 70, RR 18, Temp 98 F  Heart: normal S1S2, RRR  Lungs: CTA b/l  Abdomen: soft, NT, ND  Extremities: 2+ Pitting edema b/l in the LE    Consults: Infectious Disease and Nephrology    Significant Diagnostic Studies: Cr 2.12, Hb 12.7, WBC 6.1    Disposition: home    Discharge Medications:   Current Discharge Medication List      CONTINUE these medications which have NOT CHANGED    Details   amiodarone (CORDARONE) 200 mg tablet Take 0.5 Tabs by mouth every morning. Qty: 15 Tab, Refills: 11      carvedilol (COREG) 3.125 mg tablet Take 1 Tab by mouth daily. Qty: 30 Tab, Refills: 11      clopidogrel (PLAVIX) 75 mg tab Take 1 Tab by mouth daily. Qty: 30 Tab, Refills: 11      cholecalciferol, VITAMIN D3, (VITAMIN D3) 5,000 unit tab tablet Take 1 Tab by mouth daily. Qty: 90 Tab, Refills: 3      ferrous sulfate 325 mg (65 mg iron) tablet Take 1 Tab by mouth Daily (before breakfast). Qty: 90 Tab, Refills: 1      aspirin delayed-release 81 mg tablet Take 1 Tab by mouth daily. Qty: 30 Tab, Refills: 11      nitroglycerin (NITROSTAT) 0.4 mg SL tablet 1 Tab by SubLINGual route every five (5) minutes as needed for Chest Pain. Qty: 25 Tab, Refills: 11         STOP taking these medications       doxycycline (ADOXA) 100 mg tablet Comments:   Reason for Stopping:         furosemide (LASIX) 20 mg tablet Comments:   Reason for Stopping:               Activity: Activity as tolerated  Diet: Cardiac Diet  Wound Care: None needed    Follow-up Appointments   Procedures    FOLLOW UP VISIT Appointment in: One Week With PCP in 3-5 days.  With Cardiologist as already scheduled. With PCP in 3-5 days. With Cardiologist as already scheduled. Standing Status:   Standing     Number of Occurrences:   1     Order Specific Question:   Appointment in     Answer:    One Week       Signed By: Jonel Lakhani MD     October 5, 2017

## 2017-10-05 NOTE — PROGRESS NOTES
Problem: Self Care Deficits Care Plan (Adult)  Goal: *Acute Goals and Plan of Care (Insert Text)  1. Patient will perform grooming with supervision. 2. Patient will perform Upper body dressing with supervision  3. Patient will perform lower body dressing with minimal assist.  4. Patient will perform upper and lower body bathing with minimal assist.  5. Patient will perform toilet transfers with CGA. 6. Patient will perform shower transfer with CGA. 7. Patient will participate in 30 + minutes of ADL/ therapeutic exercise/therapeutic activity with min rest breaks to increase activity tolerance for self care. 8. Patient will perform ADL functional mobility in room with CGA. Goals to be achieved in 7 days. OCCUPATIONAL THERAPY: Daily Note, Treatment Day: 1st and AM 10/5/2017  INPATIENT: Hospital Day: 7  [place] / Product Type: Medicare /      NAME/AGE/GENDER: Altagracia Barlow is a 80 y.o. female           PRIMARY DIAGNOSIS:  Acute respiratory failure with hypoxemia ([address] Acute respiratory failure with hypoxemia ([address] Acute respiratory failure with hypoxemia (HCC)        ICD-10: Treatment Diagnosis:        · Generalized Muscle Weakness (M62.81)  · Other lack of cordination (R27.8)   Precautions/Allergies:         Pcn [penicillins]       ASSESSMENT:      Ms. Moses Collins admitted with above diagnosis. Pt found sitting in recliner with granddaughter inlaw present at bedside. Pt Performed sit to stand and took several steps x2 before requiring rest break. Pt performed B UE therex while seated. Pt left seated with needs in reach and MD present. This section established at most recent assessment   PROBLEM LIST (Impairments causing functional limitations):  1. Decreased Strength  2. Decreased ADL/Functional Activities  3. Decreased Transfer Abilities  4. Decreased Ambulation Ability/Technique  5. Decreased Balance  6.  Decreased Activity Tolerance    INTERVENTIONS PLANNED: (Benefits and precautions of occupational therapy have been discussed with the patient.)  1. Activities of daily living training  2. Adaptive equipment training  3. Balance training  4. Clothing management  5. Therapeutic activity  6. Therapeutic exercise      TREATMENT PLAN: Frequency/Duration: Follow patient 3x/week to address above goals. Rehabilitation Potential For Stated Goals: GOOD      RECOMMENDED REHABILITATION/EQUIPMENT: (at time of discharge pending progress): Due to the probability of continued deficits (see above) this patient will likely need continued skilled occupational therapy after discharge. Equipment:   · None at this time                      OCCUPATIONAL PROFILE AND HISTORY:   History of Present Injury/Illness (Reason for Referral):  89F with known ischemic cardiomyopathy, severe systolic CHF with EF ~57%, s/p AICD/PM placement, HTN and HLD who lives with grandson and grand daughter-in-law presented to the ED due to increasing confusion and persistent cough  The confusion was described as visual and auditory hallucinations. Patient also reportedly had been coughing up yellowish sputum, was seen by PCP 1 week ago, treated with a course of Doxycycline, no significant improvement reported. No fever or chills noted, No SOB was noted as well  Pt's lasix had been increased from 3x/week to every other day after patient gained 2lbs over her baseline weight of ~125KG? Lorena Curry She also had been eating poorly for the last 2 days. Patient has poor fluid intake at baseline. Other complaints noted by daughter in law is very poor sleep. Review of papers with vital from home shows baseline O2 on RA 93%. In the ER patient was hypoxic and was placed on NIPPV with improvement in Oxygenation  Past Medical History/Comorbidities:   Ms. Chau Gardner  has a past medical history of AICD (automatic cardioverter/defibrillator) present; Anemia (10/25/2012);  Aortic valve regurgitation; Arrhythmia; CAD (coronary artery disease) (9/4/2012); CHF (congestive heart failure) ([facility] (9/4/2012); CH[facility] (congestive heart failure) ([facility] (9/4/2012); Chronic kidney disease, stage II (mild); Chronic systolic heart failure ([facility] (4/16/2013); Diverticulitis; Encounter for long-term (current) use of other medications (11/19/2012); Esophagitis (2010); Falls; Femur fracture, left ([facility] (12/02/2016); Fracture of left radius (12/02/2016); Fracture of wrist (2003); HLD (hyperlipidemia) (9/4/2012); HTN (hypertension) (9/4/2012); Insomnia; Ischemic cardiomyopathy, chronic; Left bundle branch block (LBBB); Osteoporosis (9/4/2012); S/P PTCA (percutaneous transluminal coronary angioplasty); Sigmoid stricture ([facility] Stiffness in joint; and Varicose vein (2011). Ms. Calvin Best  has a past surgical history that includes orthopaedic; ptca (2012); cyst removal (2009); gyn; implantable cardioverter defibrillator; and hip replacement. Social History/Living Environment:   Home Environment: Private residence  # Steps to Enter: 1  One/Two Story Residence: One story  Living Alone: No  Support Systems: Child(nkechi)  Patient Expects to be Discharged to[de-identified] Private residence  Current DME Used/Available at Home: Raised toilet seat, Shower chair, Transfer bench, Walker, rolling, Wheelchair  Tub or Shower Type: Shower  Prior Level of Function/Work/Activity:  Assist with ADLs and ambulation   Number of Personal Factors/Comorbidities that affect the Plan of Care: Expanded review of therapy/medical records (1-2):  MODERATE COMPLEXITY   ASSESSMENT OF OCCUPATIONAL PERFORMANCE[de-identified]   Activities of Daily Living:          Basic ADLs (From Assessment) Complex ADLs (From Assessment)   Basic ADL  Feeding: Setup  Oral Facial Hygiene/Grooming: Minimum assistance  Bathing:  Moderate assistance  Upper Body Dressing: Minimum assistance  Lower Body Dressing: Maximum assistance  Toileting: Contact guard assistance     Grooming/Bathing/Dressing Activities of Daily Living     Cognitive Retraining  Safety/Judgement: Awareness of environment; Fall prevention                       Bed/Mat Mobility  Supine to Sit:  (up in chair on contact)  Sit to Stand: Minimum assistance          Most Recent Physical Functioning:   Gross Assessment:                  Posture:  Posture (WDL): Exceptions to WDL  Posture Assessment: Kyphosis, Forward head, Rounded shoulders, Trunk flexion  Balance:    Bed Mobility:  Supine to Sit:  (up in chair on contact)  Wheelchair Mobility:     Transfers:  Sit to Stand: Minimum assistance  Stand to Sit: Minimum assistance  Duration: 10 Minutes              Patient Vitals for the past 6 hrs:   BP BP Patient Position SpO2 O2 Flow Rate (L/min) Pulse   10/05/17 0730 108/57 At rest;Head of bed elevated (Comment degrees) 97 % - 70   10/05/17 0735 - - 96 % 2 l/min -   10/05/17 1038 99/46 At rest;Head of bed elevated (Comment degrees); Sitting 96 % - 70        Mental Status  Neurologic State: Alert  Orientation Level: Oriented to person, Oriented to place  Cognition: Follows commands  Perception: Appears intact  Perseveration: No perseveration noted  Safety/Judgement: Awareness of environment, Fall prevention                               Physical Skills Involved:  1. Balance  2. Strength  3. Activity Tolerance Cognitive Skills Affected (resulting in the inability to perform in a timely and safe manner): 1. none Psychosocial Skills Affected:  1. none   Number of elements that affect the Plan of Care: 1-3:  LOW COMPLEXITY   CLINICAL DECISION MAKIN [facility] [address] [address] AM-PAC 6 Clicks   Daily Activity Inpatient Short Form  How much help from another person does the patient currently need. .. Total A Lot A Little None   1. Putting on and taking off regular lower body clothing?   [ ] 1   [X] 2   [ ] 3   [ ] 4   2. Bathing (including washing, rinsing, drying)? [ ] 1   [X] 2   [ ] 3   [ ] 4   3. Toileting, which includes using toilet, bedpan or urinal?   [ ] 1   [X] 2   [ ] 3   [ ] 4   4. Putting on and taking off regular upper body clothing?   [ ] 1   [ ] 2   [X] 3   [ ] 4   5. Taking care of personal grooming such as brushing teeth? [ ] 1   [ ] 2   [X] 3   [ ] 4   6. Eating meals? [ ] 1   [ ] 2   [X] 3   [ ] 4   © 2007, Trustees of [address] [place] [address] [address] [address], under license to [employer]. All rights reserved    Score:  Initial: 15 Most Recent: X (Date: -- )     Interpretation of Tool:  Represents activities that are increasingly more difficult (i.e. Bed mobility, Transfers, Gait). Score 24 23 22-20 19-15 14-10 9-7 6       Modifier CH CI CJ CK CL CM CN         · Self Care:               - CURRENT STATUS:    CK - 40%-59% impaired, limited or restricted               - GOAL STATUS:           CJ - 20%-39% impaired, limited or restricted               - D/C STATUS:                       ---------------To be determined---------------  [place] / Product Type: Medicare /       Medical Necessity:     · Patient is expected to demonstrate progress in strength, balance, coordination and functional technique to increase independence with self care and functional mobility. Reason for Services/Other Comments:  · Patient continues to require skilled intervention due to decrease self care and functional mobility. Use of outcome tool(s) and clinical judgement create a POC that gives a: LOW COMPLEXITY             TREATMENT:   (In addition to Assessment/Re-Assessment sessions the following treatments were rendered)      Pre-treatment Symptoms/Complaints:  No complaints  Pain: Initial:   Pain Intensity 1: 0  Post Session:  0      Therapeutic Exercise: (  20):  Exercises per grid below to improve mobility and strength. Required minimal visual and tactile cues to promote proper body alignment and promote proper body breathing techniques. Progressed repetitions as indicated.          Braces/Orthotics/Lines/Etc:   · O2 Device: Room air  Treatment/Session Assessment:    · Response to Treatment:  Tolerated well  · Interdisciplinary Collaboration:  · Registered Nurse  · Rehabilitation Attendant  · After treatment position/precautions:  · Up in chair  · Bed alarm/tab alert on  · Bed/Chair-wheels locked  · Caregiver at bedside  · Call light within reach  · RN notified  · Compliance with Program/Exercises: compliant all of the time. · Recommendations/Intent for next treatment session: \"Next visit will focus on advancements to more challenging activities and reduction in assistance provided\".   Total Treatment Duration:OT Patient Time In/Time Out  Time In: 2084  Time Out: [address] [facility] mins     Ermelinda Hill, OT

## 2017-10-06 PROBLEM — N18.30 CHRONIC KIDNEY DISEASE, STAGE III (MODERATE) (HCC): Status: ACTIVE | Noted: 2017-01-01

## 2017-10-06 PROBLEM — N18.4 CHRONIC KIDNEY DISEASE (CKD) STAGE G4/A1, SEVERELY DECREASED GLOMERULAR FILTRATION RATE (GFR) BETWEEN 15-29 ML/MIN/1.73 SQUARE METER AND ALBUMINURIA CREATININE RATIO LESS THAN 30 MG/G (HCC): Status: ACTIVE | Noted: 2017-01-01

## 2017-10-06 NOTE — PROGRESS NOTES
Initial outreach attempt to patient was unsuccessful. Second outreach attempt will be made within 24 hours. This note will not be viewable in [address]

## 2017-10-07 NOTE — Clinical Note
Bundle lakhwinder patient referral FYI: Granddaughter verbalizes patient will be placed under hospice care starting next week per Cardiology orders. TY!

## 2017-10-07 NOTE — PROGRESS NOTES
Transition of Care Discharge Follow-up Questionnaire   Date/Time of Call:   10/7/17 3:54p   What was the patient hospitalized for? Acute respiratory failure with hypoxemia     Does the patient understand his/her diagnosis and/or treatment and what happened during the hospitalization? Patients granddaughter states that she understands the diagnosis and treatments that occurred. Did the patient receive discharge instructions? Yes   Review any discharge instructions (see notes in [facility]). Ask patient if they understand these. Do they have any questions? Pts granddaughter does not have any questions about discharge instructions. Were home services ordered (nursing, PT, OT, ST, etc.)? [facility]   If so, has the first visit occurred? If not, why? (Assist with coordination of services if necessary.) No awaiting visit   Was any DME ordered? No   If so, has it been received? If not, why?  (Assist with coordination of arranging DME orders if necessary.) n/a   Complete a review of all medications (new, continued and discontinued meds per the D/C instructions and medication tab in [facility]). Patients granddaughter and care coordinator reviewed current medications. Were all new prescriptions filled? If not, why?  (Assist with obtainment of medications if necessary.) No medication changes   Does the patient understand the purpose and dosing instructions for all medications? (If patient has questions, provide explanation and education.) Granddaughter understands meds and dosing instructions. Does the patient have any problems in performing ADLs? (If patient is unable to perform ADLs  what is the limiting factor(s)? Do they have a support system that can assist? If no support system is present, discuss possible assistance that they may be able to obtain.) Patient depends on granddaughter for assistance with ADLs. Does the patient have all follow-up appointments scheduled?       7 day f/up with PCP?    7-14 day f/up with specialist?    If f/up has not been made  what actions has the care coordinator made to accomplish this? Has transportation been arranged? [facility] follow-up should be within 7 days of discharge; all others should have PCP follow-up within 7 days of discharge; follow-ups with other specialists should be within 7-14 days of discharge.) Patient has the following appt(s):  10/6 Cardiology  10/10 with PCP  Granddaughter does not verbalize any transportation needs. Any other questions or concerns expressed by the patient? Granddaughter verbalizes patient will be under hospice care beginning next week. Granddaughter does not verbalize any other questions or concerns. She was thankful for call. Schedule next appointment with SHUN English or refer to RN Case Manager/  per the workflow guidelines. When is care coordinators next follow-up call scheduled? CC will forward bundle payment patient to [facility]IN Ndiaye      N/A   ROSE Call Completed By:   Catalina Gill LPN  Care Coordinator       This note will not be viewable in [address]

## 2017-10-10 NOTE — PROGRESS NOTES
Outreach per bundled pneumonia protocol. Ms. Viet Marroquin is now enrolled with [facility] for in-home hospice care. No further outreach planned due to hospice admission.

## 2020-01-15 NOTE — ANESTHESIA POSTPROCEDURE EVALUATION
Post-Anesthesia Evaluation and Assessment    Patient: Patrai Garcia MRN: [id number]  SSN: [id number]    YOB: 1926  Age: 80 y.o. Sex: female       Cardiovascular Function/Vital Signs  Visit Vitals    /60    Pulse 69    Temp 36.7 °C (98 °F)    Resp 18    Ht 5' 2\" (1.575 m)    Wt 55.8 kg (123 lb)    SpO2 97%    BMI 22.5 kg/m2       Patient is status post spinal, regional anesthesia for Procedure(s):  LEFT PROXIMAL ULNA OPEN REDUCTION INTERNAL FIXATION/LEFT RADIAL HEAD DISLOCATION  HARDWARE REMOVAL LEFT HIP WITH CONVERSION TO HIP HEMIARTHROPLASTY. Nausea/Vomiting: None    Postoperative hydration reviewed and adequate. Pain:  Pain Scale 1: Numeric (0 - 10) (01/12/17 1738)  Pain Intensity 1: 0 (01/12/17 1738)   Managed    Neurological Status:   Neuro (WDL): Exceptions to WDL (01/12/17 1738)  Neuro  Orientation Level: Appropriate for age;Oriented to person;Disoriented to time;Disoriented to place (aware \"in hospital\" but stated she was in \"[facility]\") (01/12/17 1118)  Cognition: Follows commands;Memory loss (01/12/17 1118)  LUE Motor Response: Numbness (01/12/17 1738)  LLE Motor Response: Numbness (01/12/17 1738)  RUE Motor Response: Purposeful (01/12/17 1738)  RLE Motor Response: Numbness (01/12/17 1738)   At baseline    Mental Status and Level of Consciousness: Arousable    Pulmonary Status:   O2 Device: Nasal cannula (01/12/17 1738)   Adequate oxygenation and airway patent    Complications related to anesthesia: None    Post-anesthesia assessment completed.  No concerns    Signed By: Charito Ramirez MD     January 12, 2017 Pt transferred back from PACU. Report received from PACU RN. COLEEN. Pt has no complaints of pain. Pt A&Ox4. Discussed octreotide infusion with Dr. Cassidy, will hold for now and discuss with GI. Pt has medical transfer orders.

## (undated) DEVICE — IMMOBILIZER KNEE PREMIER PRO TRI PNL 24INCH FOAM TIETEX PAT

## (undated) DEVICE — RETRIEVER SUT ARTHSCP HOFFEE --

## (undated) DEVICE — DRESSING IN STRIPPABLE ENVELOPE: Brand: DERMACEA

## (undated) DEVICE — 3M™ IOBAN™ 2 ANTIMICROBIAL INCISE DRAPE 6648EZ: Brand: IOBAN™ 2

## (undated) DEVICE — DRAPE TBL W72XH34IN D30IN SGL PC DISPOSABLE

## (undated) DEVICE — 1010 S-DRAPE TOWEL DRAPE 10/BX: Brand: STERI-DRAPE™

## (undated) DEVICE — 2000CC GUARDIAN II: Brand: GUARDIAN

## (undated) DEVICE — BANDAGE E W6INXL11YD CLP CLSR DBL LEN FLEX-MASTER

## (undated) DEVICE — DRAPE,U/SHT,SPLIT,FILM,60X84,STERILE: Brand: MEDLINE

## (undated) DEVICE — IMMOBILIZER KNEE 3 PNL 19 IN

## (undated) DEVICE — REM POLYHESIVE ADULT PATIENT RETURN ELECTRODE: Brand: VALLEYLAB

## (undated) DEVICE — DERMABOND SKIN ADH 0.7ML -- DERMABOND ADVANCED 12/BX

## (undated) DEVICE — 2.0MM DRILL BIT WITH DEPTH MARK/QC/140MM

## (undated) DEVICE — SUTURE MCRYL SZ 2-0 L27IN ABSRB VLT CT-1 L36MM 1/2 CIR TAPR Y339H

## (undated) DEVICE — STERILE HOOK LOCK LATEX FREE ELASTIC BANDAGE 4INX5YD: Brand: HOOK LOCK™

## (undated) DEVICE — FAN SPRAY KIT: Brand: PULSAVAC®

## (undated) DEVICE — SUTURE 5 MERS GRN 30 TO 40 IN D9211

## (undated) DEVICE — 3M™ TEGADERM™ TRANSPARENT FILM DRESSING FRAME STYLE, 1627, 4 IN X 10 IN (10 CM X 25 CM), 20/CT 4CT/CASE: Brand: 3M™ TEGADERM™

## (undated) DEVICE — BIT DRL DIA2.8MM SHT CALIB QUIK CPL W/ STP PRO-PAK

## (undated) DEVICE — Device

## (undated) DEVICE — SCREW BNE L16MM DIA3.5MM CORT S STL ST NONCANNULATED LOK
Type: IMPLANTABLE DEVICE | Site: ELBOW | Status: NON-FUNCTIONAL
Removed: 2017-01-12

## (undated) DEVICE — 3M™ COBAN™ NL STERILE NON-LATEX SELF-ADHERENT WRAP, 2086S, 6 IN X 5 YD (15 CM X 4,5 M), 12 ROLLS/CASE: Brand: 3M™ COBAN™

## (undated) DEVICE — INTENDED FOR TISSUE SEPARATION, AND OTHER PROCEDURES THAT REQUIRE A SHARP SURGICAL BLADE TO PUNCTURE OR CUT.: Brand: BARD-PARKER ® STAINLESS STEEL BLADES

## (undated) DEVICE — SUTURE PDS II SZ 1 L96IN ABSRB VLT TP-1 L65MM 1/2 CIR Z880G

## (undated) DEVICE — SOLUTION IRRIG 3000ML 0.9% SOD CHL FLX CONT 0797208] ICU MEDICAL INC]

## (undated) DEVICE — IMMOBILIZER SHOULDER SLING SWATHE DLX

## (undated) DEVICE — SUTURE MCRYL SZ 0 L27IN ABSRB VLT CT-1 L36MM 1/2 CIR TAPR Y340H

## (undated) DEVICE — SOLUTION IV 1000ML 0.9% SOD CHL

## (undated) DEVICE — 2.5MM DRILL BIT/QC/GOLD/110MM

## (undated) DEVICE — SLIM BODY SKIN STAPLER: Brand: APPOSE ULC

## (undated) DEVICE — DISPOSABLE TOURNIQUET CUFF SINGLE BLADDER, DUAL PORT AND QUICK CONNECT CONNECTOR: Brand: COLOR CUFF

## (undated) DEVICE — BUTTON SWITCH PENCIL BLADE ELECTRODE, HOLSTER: Brand: EDGE

## (undated) DEVICE — 2108 SERIES SAGITTAL BLADE (24.8 X 0.88 X 90.5MM)

## (undated) DEVICE — (D)PREP SKN CHLRAPRP APPL 26ML -- CONVERT TO ITEM 371833

## (undated) DEVICE — X-LARGE COTTON GLOVE: Brand: DEROYAL

## (undated) DEVICE — 3000CC GUARDIAN II: Brand: GUARDIAN